# Patient Record
Sex: MALE | Race: BLACK OR AFRICAN AMERICAN | Employment: OTHER | URBAN - METROPOLITAN AREA
[De-identification: names, ages, dates, MRNs, and addresses within clinical notes are randomized per-mention and may not be internally consistent; named-entity substitution may affect disease eponyms.]

---

## 2017-05-30 ENCOUNTER — OP HISTORICAL/CONVERTED ENCOUNTER (OUTPATIENT)
Dept: OTHER | Age: 72
End: 2017-05-30

## 2020-12-09 ENCOUNTER — OFFICE VISIT (OUTPATIENT)
Dept: ORTHOPEDIC SURGERY | Age: 75
End: 2020-12-09
Payer: MEDICARE

## 2020-12-09 VITALS — WEIGHT: 250 LBS | HEIGHT: 69 IN | BODY MASS INDEX: 37.03 KG/M2

## 2020-12-09 DIAGNOSIS — M75.52 BURSITIS OF LEFT SHOULDER: ICD-10-CM

## 2020-12-09 DIAGNOSIS — M25.512 LEFT SHOULDER PAIN, UNSPECIFIED CHRONICITY: Primary | ICD-10-CM

## 2020-12-09 DIAGNOSIS — M17.11 PRIMARY OSTEOARTHRITIS OF RIGHT KNEE: ICD-10-CM

## 2020-12-09 DIAGNOSIS — M25.562 LEFT KNEE PAIN, UNSPECIFIED CHRONICITY: ICD-10-CM

## 2020-12-09 PROCEDURE — 99203 OFFICE O/P NEW LOW 30 MIN: CPT | Performed by: ORTHOPAEDIC SURGERY

## 2020-12-09 PROCEDURE — 20611 DRAIN/INJ JOINT/BURSA W/US: CPT | Performed by: ORTHOPAEDIC SURGERY

## 2020-12-09 RX ORDER — METOPROLOL TARTRATE 100 MG/1
TABLET ORAL 2 TIMES DAILY
COMMUNITY

## 2020-12-09 RX ORDER — ATORVASTATIN CALCIUM 40 MG/1
TABLET, FILM COATED ORAL DAILY
COMMUNITY
End: 2022-06-17

## 2020-12-09 RX ORDER — TRIAMCINOLONE ACETONIDE 40 MG/ML
40 INJECTION, SUSPENSION INTRA-ARTICULAR; INTRAMUSCULAR ONCE
Status: COMPLETED | OUTPATIENT
Start: 2020-12-09 | End: 2020-12-09

## 2020-12-09 RX ORDER — LIDOCAINE HYDROCHLORIDE 10 MG/ML
9 INJECTION INFILTRATION; PERINEURAL ONCE
Status: COMPLETED | OUTPATIENT
Start: 2020-12-09 | End: 2020-12-09

## 2020-12-09 RX ORDER — FUROSEMIDE 40 MG/1
TABLET ORAL DAILY
COMMUNITY
End: 2022-06-17

## 2020-12-09 RX ORDER — ISOSORBIDE MONONITRATE 60 MG/1
TABLET, EXTENDED RELEASE ORAL
COMMUNITY

## 2020-12-09 RX ORDER — NAPROXEN 500 MG/1
500 TABLET ORAL 2 TIMES DAILY WITH MEALS
COMMUNITY
End: 2022-06-17

## 2020-12-09 RX ORDER — OLMESARTAN MEDOXOMIL 20 MG/1
20 TABLET ORAL DAILY
COMMUNITY
End: 2022-02-28

## 2020-12-09 RX ORDER — NITROGLYCERIN 400 UG/1
1 SPRAY ORAL
COMMUNITY
End: 2022-02-28

## 2020-12-09 RX ADMIN — TRIAMCINOLONE ACETONIDE 40 MG: 40 INJECTION, SUSPENSION INTRA-ARTICULAR; INTRAMUSCULAR at 14:32

## 2020-12-09 RX ADMIN — LIDOCAINE HYDROCHLORIDE 9 ML: 10 INJECTION INFILTRATION; PERINEURAL at 14:32

## 2020-12-09 NOTE — PATIENT INSTRUCTIONS
Shoulder Pain: Care Instructions  Your Care Instructions     You can hurt your shoulder by using it too much during an activity, such as fishing or baseball. It can also happen as part of the everyday wear and tear of getting older. Shoulder injuries can be slow to heal, but your shoulder should get better with time. Your doctor may recommend a sling to rest your shoulder. If you have injured your shoulder, you may need testing and treatment. Follow-up care is a key part of your treatment and safety. Be sure to make and go to all appointments, and call your doctor if you are having problems. It's also a good idea to know your test results and keep a list of the medicines you take. How can you care for yourself at home? · Take pain medicines exactly as directed. ? If the doctor gave you a prescription medicine for pain, take it as prescribed. ? If you are not taking a prescription pain medicine, ask your doctor if you can take an over-the-counter medicine. ? Do not take two or more pain medicines at the same time unless the doctor told you to. Many pain medicines contain acetaminophen, which is Tylenol. Too much acetaminophen (Tylenol) can be harmful. · If your doctor recommends that you wear a sling, use it as directed. Do not take it off before your doctor tells you to. · Put ice or a cold pack on the sore area for 10 to 20 minutes at a time. Put a thin cloth between the ice and your skin. · If there is no swelling, you can put moist heat, a heating pad, or a warm cloth on your shoulder. Some doctors suggest alternating between hot and cold. · Rest your shoulder for a few days. If your doctor recommends it, you can then begin gentle exercise of the shoulder, but do not lift anything heavy. When should you call for help? Call 911 anytime you think you may need emergency care. For example, call if:    · You have chest pain or pressure. This may occur with:  ? Sweating. ?  Shortness of breath. ? Nausea or vomiting. ? Pain that spreads from the chest to the neck, jaw, or one or both shoulders or arms. ? Dizziness or lightheadedness. ? A fast or uneven pulse. After calling 911, chew 1 adult-strength aspirin. Wait for an ambulance. Do not try to drive yourself.     · Your arm or hand is cool or pale or changes color. Call your doctor now or seek immediate medical care if:    · You have signs of infection, such as:  ? Increased pain, swelling, warmth, or redness in your shoulder. ? Red streaks leading from a place on your shoulder. ? Pus draining from an area of your shoulder. ? Swollen lymph nodes in your neck, armpits, or groin. ? A fever. Watch closely for changes in your health, and be sure to contact your doctor if:    · You cannot use your shoulder.     · Your shoulder does not get better as expected. Where can you learn more? Go to http://www.edwards.com/  Enter H996 in the search box to learn more about \"Shoulder Pain: Care Instructions. \"  Current as of: March 2, 2020               Content Version: 12.6  © 5507-3395 [employer]. Care instructions adapted under license by [employer] (which disclaims liability or warranty for this information). If you have questions about a medical condition or this instruction, always ask your healthcare professional. [place] [address] any warranty or liability for your use of this information. Knee Pain or Injury: Care Instructions  Your Care Instructions     Injuries are a common cause of knee problems. Sudden (acute) injuries may be caused by a direct blow to the knee. They can also be caused by abnormal twisting, bending, or falling on the knee. Pain, bruising, or swelling may be severe, and may start within minutes of the injury. Overuse is another cause of knee pain. Other causes are climbing stairs, kneeling, and other activities that use the knee.  Everyday wear and tear, especially as you get older, also can cause knee pain. Rest, along with home treatment, often relieves pain and allows your knee to heal. If you have a serious knee injury, you may need tests and treatment. Follow-up care is a key part of your treatment and safety. Be sure to make and go to all appointments, and call your doctor if you are having problems. It's also a good idea to know your test results and keep a list of the medicines you take. How can you care for yourself at home? · Be safe with medicines. Read and follow all instructions on the label. ? If the doctor gave you a prescription medicine for pain, take it as prescribed. ? If you are not taking a prescription pain medicine, ask your doctor if you can take an over-the-counter medicine. · Rest and protect your knee. Take a break from any activity that may cause pain. · Put ice or a cold pack on your knee for 10 to 20 minutes at a time. Put a thin cloth between the ice and your skin. · Prop up a sore knee on a pillow when you ice it or anytime you sit or lie down for the next 3 days. Try to keep it above the level of your heart. This will help reduce swelling. · If your knee is not swollen, you can put moist heat, a heating pad, or a warm cloth on your knee. · If your doctor recommends an elastic bandage, sleeve, or other type of support for your knee, wear it as directed. · Follow your doctor's instructions about how much weight you can put on your leg. Use a cane, crutches, or a walker as instructed. · Follow your doctor's instructions about activity during your healing process. If you can do mild exercise, slowly increase your activity. · Reach and stay at a healthy weight. Extra weight can strain the joints, especially the knees and hips, and make the pain worse. Losing even a few pounds may help. When should you call for help? Call 911 anytime you think you may need emergency care.  For example, call if:    · You have symptoms of a blood clot in your lung (called a pulmonary embolism). These may include:  ? Sudden chest pain. ? Trouble breathing. ? Coughing up blood. Call your doctor now or seek immediate medical care if:    · You have severe or increasing pain.     · Your leg or foot turns cold or changes color.     · You cannot stand or put weight on your knee.     · Your knee looks twisted or bent out of shape.     · You cannot move your knee.     · You have signs of infection, such as:  ? Increased pain, swelling, warmth, or redness. ? Red streaks leading from the knee. ? Pus draining from a place on your knee. ? A fever.     · You have signs of a blood clot in your leg (called a deep vein thrombosis), such as:  ? Pain in your calf, back of the knee, thigh, or groin. ? Redness and swelling in your leg or groin. Watch closely for changes in your health, and be sure to contact your doctor if:    · You have tingling, weakness, or numbness in your knee.     · You have any new symptoms, such as swelling.     · You have bruises from a knee injury that last longer than 2 weeks.     · You do not get better as expected. Where can you learn more? Go to http://www.gray.com/  Enter K195 in the search box to learn more about \"Knee Pain or Injury: Care Instructions. \"  Current as of: June 26, 2019               Content Version: 12.6  © 6367-0123 [employer]. Care instructions adapted under license by [employer] (which disclaims liability or warranty for this information). If you have questions about a medical condition or this instruction, always ask your healthcare professional. [address] any warranty or liability for your use of this information.

## 2020-12-09 NOTE — PROGRESS NOTES
Name: Stephen Morales    : 1945     Service Dept: [address] [place] [address] and Sports Medicine    Patient's Pharmacies:    [facility]/pharmacy #[id number] - Danyel Montano  [address] [place] [id number]  Phone: [phone] Fax: [phone]       Chief Complaint   Patient presents with    Shoulder Pain        Visit Vitals  Ht 5' 9\" (1.753 m)   Wt 250 lb (113.4 kg)   BMI 36.92 kg/m²      No Known Allergies   Current Outpatient Medications   Medication Sig Dispense Refill    nitroglycerin (NITROLINGUAL) 400 mcg/spray spray 1 Spray by SubLINGual route every five (5) minutes as needed for Chest Pain.  furosemide (LASIX) 40 mg tablet Take  by mouth daily.  naproxen (NAPROSYN) 500 mg tablet Take 500 mg by mouth two (2) times daily (with meals).  olmesartan (BENICAR) 20 mg tablet Take 20 mg by mouth daily.  atorvastatin (LIPITOR) 40 mg tablet Take  by mouth daily.  metoprolol tartrate (LOPRESSOR) 100 mg IR tablet Take  by mouth two (2) times a day.  isosorbide mononitrate ER (IMDUR) 60 mg CR tablet Take  by mouth every morning. Current Facility-Administered Medications   Medication Dose Route Frequency Provider Last Rate Last Dose    lidocaine (XYLOCAINE) 10 mg/mL (1 %) injection 9 mL  9 mL Other ONCE Jaswant Scott MD        triamcinolone acetonide (KENALOG-40) 40 mg/mL injection 40 mg  40 mg Intra artICUlar ONCE Jaswant Scott MD        lidocaine (XYLOCAINE) 10 mg/mL (1 %) injection 9 mL  9 mL Other ONCE Jaswant Scott MD        triamcinolone acetonide (KENALOG-40) 40 mg/mL injection 40 mg  40 mg Intra artICUlar ONCE Jaswant Scott MD          There is no problem list on file for this patient.      Family History   Problem Relation Age of Onset    No Known Problems Mother     No Known Problems Father       Social History     Tobacco Use    Smoking status: Former Smoker     Packs/day: 0.50 Years: 0.50     Pack years: 0.25     Last attempt to quit: 12/9/2020    Smokeless tobacco: Never Used   Substance and Sexual Activity    Alcohol use: Never     Frequency: Never    Drug use: Never    Sexual activity: Not Currently      History reviewed. No pertinent surgical history. Past Medical History:   Diagnosis Date    Arthritis     High cholesterol     Hypertension         I have reviewed and agree with PFSH and ROS and intake form in chart and the record. Review of Systems:   Patient is a pleasant appearing individual, appropriately dressed, well hydrated, well nourished, who is alert, appropriately oriented for age, and in no acute distress with a normal gait and normal affect who does not appear to be in any significant pain. Physical Exam:  Left Knee -Decrease range of motion with flexion, Knee arc of greater than 50 degrees, Some crepitation, Grossly neurovascularly intact, Good cap refill, No skin lesion, Moderate swelling, No gross instability, Some quadriceps weakness, greater than 50 degree arc    Right  Knee -Decrease range of motion with flexion, Knee arc of greater than 50 degrees, Some crepitation, Grossly neurovascularly intact, Good cap refill, No skin lesion, Moderate swelling, No gross instability, Some quadriceps weakness, greater than 50 degree arc      Procedure Documentation:    Please note that [employer] ultrasound was used to perform an ultrasound guided injection into the left shoulder. A pre-injection ultrasound was taken of left shoulder. After the needle was placed into the Posterior portal(s) and while the kenelog was injected another ultrasound picture confirmed the appropriate placement. The site of injection, left shoulder, was sterilely prepped. The injection of 40 mg Kenalog and Lidocaine was administered appropriately in left shoulder and the patient tolerated it well. No site reaction was identified. Appropriate dressing was placed.  Consent was obtained for the injection. Procedure Documentation:    Please note that [employer] ultrasound was used to perform an ultrasound guided injection into the left knee. A pre-injection ultrasound was taken of the left knee. After the needle was placed into the anterolateral portal(s) and while the kenelog was injected another ultrasound picture confirmed the appropriate placement. The site of injection, left knee, was sterilely prepped. The injection of 40 mg Kenalog and Lidocaine was administered appropriately into left knee and the patient tolerated it well. No site reaction was identified. Appropriate dressing was placed. Consent was obtained for the injection. Encounter Diagnoses     ICD-10-CM ICD-9-CM   1. Left shoulder pain, unspecified chronicity  M25.512 719.41   2. Bursitis of left shoulder  M75.52 726.10   3. Primary osteoarthritis of right knee  M17.11 715.16   4. Left knee pain, unspecified chronicity  M25.562 719.46       HPI:  The patient is here with a chief complaint of left shoulder pain, left knee pain, sharp throbbing pain. Nothing has helped. Using it makes it worse. Pain is 9/10. ROS:  10-point review of systems is positive for nighttime pain and weakness. X-rays of the left knee are unremarkable except for mild osteoarthritis. X-ray of the left shoulder are positive for lateral acromial spur. Assessment/Plan:  1. Left shoulder bursitis and left knee arthritic flare. Plan would be for cortisone injection for both. We will see the patient back in 1 week and go from there. Return to Office: Follow-up and Dispositions    · Return in about 1 week (around 12/16/2020). Scribed by Michael Campuzano as dictated by Luisa Cortez. Saeid Qiu MD.  Documentation True and Accepted Jaswant Qiu MD

## 2020-12-09 NOTE — LETTER
Blanca Handleyr 1945  
[id number]  
 
 
12/9/2020 I hereby authorize and direct Jaswant Gustafson MD, Colby Taylor, and whomever he may designate as his associate to perform upon myself the following procedure: 
 
Injection of: Kenalog, Supartz, Euflexxa, Orthovisc in the Right/Left ____________________. If any unforeseen condition arises in the course of the procedure, I further authorize him and his associated and/or assistant(s) to do whatever he/she deems advisable. The nature, purpose, benefits, risks, side effects, likelihood of achieving goals, and potential problems that might occur during recuperation, risks for not receiving the proposed care, treatment and services and alternatives of the procedure have been fully explained to me by my physician including, but not limited to: 
 
Swelling, joint pain, skin pigment changes, worsening of condition, and failure to improve. I acknowledge that no guarantee or assurance has been made to me as to the results that may be obtained or the likelihood of success. _______________________________________ Signature of patient or authorized representative [employer] and Sports Medicine fax: [phone]

## 2021-02-24 ENCOUNTER — APPOINTMENT (OUTPATIENT)
Dept: CT IMAGING | Age: 76
End: 2021-02-24
Attending: EMERGENCY MEDICINE
Payer: MEDICARE

## 2021-02-24 ENCOUNTER — HOSPITAL ENCOUNTER (EMERGENCY)
Age: 76
Discharge: ANOTHER HOSPITAL | End: 2021-02-24
Attending: EMERGENCY MEDICINE
Payer: MEDICARE

## 2021-02-24 ENCOUNTER — APPOINTMENT (OUTPATIENT)
Dept: GENERAL RADIOLOGY | Age: 76
End: 2021-02-24
Attending: EMERGENCY MEDICINE
Payer: MEDICARE

## 2021-02-24 ENCOUNTER — HOSPITAL ENCOUNTER (INPATIENT)
Age: 76
LOS: 4 days | Discharge: HOME | DRG: 389 | End: 2021-03-01
Attending: EMERGENCY MEDICINE | Admitting: FAMILY MEDICINE
Payer: MEDICARE

## 2021-02-24 VITALS
TEMPERATURE: 98.4 F | OXYGEN SATURATION: 96 % | WEIGHT: 258 LBS | SYSTOLIC BLOOD PRESSURE: 165 MMHG | RESPIRATION RATE: 20 BRPM | HEART RATE: 89 BPM | HEIGHT: 69 IN | BODY MASS INDEX: 38.21 KG/M2 | DIASTOLIC BLOOD PRESSURE: 82 MMHG

## 2021-02-24 DIAGNOSIS — K56.600 PARTIAL INTESTINAL OBSTRUCTION, UNSPECIFIED CAUSE (HCC): Primary | ICD-10-CM

## 2021-02-24 LAB
ALBUMIN SERPL-MCNC: 4 G/DL (ref 3.5–5)
ALBUMIN/GLOB SERPL: 1 {RATIO} (ref 1.1–2.2)
ALP SERPL-CCNC: 85 U/L (ref 45–117)
ALT SERPL-CCNC: 33 U/L (ref 12–78)
ANION GAP SERPL CALC-SCNC: 4 MMOL/L (ref 5–15)
AST SERPL W P-5'-P-CCNC: 23 U/L (ref 15–37)
BASOPHILS # BLD: 0.1 K/UL (ref 0–0.2)
BASOPHILS NFR BLD: 1 % (ref 0–2.5)
BILIRUB SERPL-MCNC: 0.8 MG/DL (ref 0.2–1)
BUN SERPL-MCNC: 22 MG/DL (ref 6–20)
BUN/CREAT SERPL: 15 (ref 12–20)
CA-I BLD-MCNC: 9.5 MG/DL (ref 8.5–10.1)
CHLORIDE SERPL-SCNC: 104 MMOL/L (ref 97–108)
CO2 SERPL-SCNC: 34 MMOL/L (ref 21–32)
CREAT SERPL-MCNC: 1.47 MG/DL (ref 0.7–1.3)
EOSINOPHIL # BLD: 0.2 K/UL (ref 0–0.7)
EOSINOPHIL NFR BLD: 3 % (ref 0.9–2.9)
ERYTHROCYTE [DISTWIDTH] IN BLOOD BY AUTOMATED COUNT: 14.9 % (ref 11.5–14.5)
GLOBULIN SER CALC-MCNC: 4 G/DL (ref 2–4)
GLUCOSE SERPL-MCNC: 130 MG/DL (ref 65–100)
HCT VFR BLD AUTO: 39.6 % (ref 41–53)
HGB BLD-MCNC: 13 G/DL (ref 13.5–17.5)
INR PPP: 1.1 (ref 0.9–1.1)
LIPASE SERPL-CCNC: 117 U/L (ref 73–393)
LYMPHOCYTES # BLD: 1 K/UL (ref 1–4.8)
LYMPHOCYTES NFR BLD: 12 % (ref 20.5–51.1)
MAGNESIUM SERPL-MCNC: 2 MG/DL (ref 1.6–2.4)
MCH RBC QN AUTO: 28.2 PG (ref 31–34)
MCHC RBC AUTO-ENTMCNC: 32.9 G/DL (ref 31–36)
MCV RBC AUTO: 85.5 FL (ref 80–100)
MONOCYTES # BLD: 0.7 K/UL (ref 0.2–2.4)
MONOCYTES NFR BLD: 8 % (ref 1.7–9.3)
NEUTS SEG # BLD: 6.5 K/UL (ref 1.8–7.7)
NEUTS SEG NFR BLD: 76 % (ref 42–75)
NRBC # BLD: 0.01 K/UL
NRBC BLD-RTO: 0.1 PER 100 WBC
PLATELET # BLD AUTO: 221 K/UL
PMV BLD AUTO: 8.7 FL (ref 6.5–11.5)
POTASSIUM SERPL-SCNC: 3.9 MMOL/L (ref 3.5–5.1)
PROT SERPL-MCNC: 8 G/DL (ref 6.4–8.2)
PROTHROMBIN TIME: 10.4 SEC (ref 9–11.1)
RBC # BLD AUTO: 4.63 M/UL (ref 4.5–5.9)
SODIUM SERPL-SCNC: 142 MMOL/L (ref 136–145)
WBC # BLD AUTO: 8.5 K/UL (ref 4.4–11.3)

## 2021-02-24 PROCEDURE — 83735 ASSAY OF MAGNESIUM: CPT

## 2021-02-24 PROCEDURE — 99285 EMERGENCY DEPT VISIT HI MDM: CPT

## 2021-02-24 PROCEDURE — 80053 COMPREHEN METABOLIC PANEL: CPT

## 2021-02-24 PROCEDURE — 99284 EMERGENCY DEPT VISIT MOD MDM: CPT

## 2021-02-24 PROCEDURE — 96375 TX/PRO/DX INJ NEW DRUG ADDON: CPT

## 2021-02-24 PROCEDURE — 93005 ELECTROCARDIOGRAM TRACING: CPT

## 2021-02-24 PROCEDURE — 0D9670Z DRAINAGE OF STOMACH WITH DRAINAGE DEVICE, VIA NATURAL OR ARTIFICIAL OPENING: ICD-10-PCS | Performed by: FAMILY MEDICINE

## 2021-02-24 PROCEDURE — 71045 X-RAY EXAM CHEST 1 VIEW: CPT

## 2021-02-24 PROCEDURE — 85610 PROTHROMBIN TIME: CPT

## 2021-02-24 PROCEDURE — 74011000250 HC RX REV CODE- 250

## 2021-02-24 PROCEDURE — 85025 COMPLETE CBC W/AUTO DIFF WBC: CPT

## 2021-02-24 PROCEDURE — 96376 TX/PRO/DX INJ SAME DRUG ADON: CPT

## 2021-02-24 PROCEDURE — 96374 THER/PROPH/DIAG INJ IV PUSH: CPT

## 2021-02-24 PROCEDURE — 74176 CT ABD & PELVIS W/O CONTRAST: CPT

## 2021-02-24 PROCEDURE — 74011250636 HC RX REV CODE- 250/636: Performed by: EMERGENCY MEDICINE

## 2021-02-24 PROCEDURE — 83690 ASSAY OF LIPASE: CPT

## 2021-02-24 RX ORDER — SODIUM CHLORIDE 9 MG/ML
150 INJECTION, SOLUTION INTRAVENOUS ONCE
Status: COMPLETED | OUTPATIENT
Start: 2021-02-24 | End: 2021-02-24

## 2021-02-24 RX ORDER — KETOROLAC TROMETHAMINE 30 MG/ML
15 INJECTION, SOLUTION INTRAMUSCULAR; INTRAVENOUS
Status: COMPLETED | OUTPATIENT
Start: 2021-02-24 | End: 2021-02-24

## 2021-02-24 RX ORDER — MORPHINE SULFATE 4 MG/ML
4 INJECTION, SOLUTION INTRAMUSCULAR; INTRAVENOUS ONCE
Status: COMPLETED | OUTPATIENT
Start: 2021-02-24 | End: 2021-02-24

## 2021-02-24 RX ORDER — ONDANSETRON 2 MG/ML
4 INJECTION INTRAMUSCULAR; INTRAVENOUS ONCE
Status: COMPLETED | OUTPATIENT
Start: 2021-02-24 | End: 2021-02-24

## 2021-02-24 RX ADMIN — MORPHINE SULFATE 4 MG: 4 INJECTION, SOLUTION INTRAMUSCULAR; INTRAVENOUS at 18:50

## 2021-02-24 RX ADMIN — MORPHINE SULFATE 4 MG: 4 INJECTION, SOLUTION INTRAMUSCULAR; INTRAVENOUS at 18:04

## 2021-02-24 RX ADMIN — TOPICAL ANESTHETIC 1 SPRAY: 200 SPRAY DENTAL; PERIODONTAL at 20:01

## 2021-02-24 RX ADMIN — KETOROLAC TROMETHAMINE 15 MG: 30 INJECTION, SOLUTION INTRAMUSCULAR at 18:50

## 2021-02-24 RX ADMIN — ONDANSETRON 4 MG: 2 INJECTION INTRAMUSCULAR; INTRAVENOUS at 18:03

## 2021-02-24 RX ADMIN — SODIUM CHLORIDE 150 ML/HR: 9 INJECTION, SOLUTION INTRAVENOUS at 18:02

## 2021-02-24 NOTE — ED TRIAGE NOTES
.  Chief Complaint   Patient presents with    Abdominal Pain     pt states abdominal pain since this morning hard to have a bowel movement, had to strain at this time to have bowel movement. Pmh of bowel obstruction w/surgery. Pt rates pain 10/10.  pt does have abdominal distention present

## 2021-02-25 ENCOUNTER — APPOINTMENT (OUTPATIENT)
Dept: GENERAL RADIOLOGY | Age: 76
DRG: 389 | End: 2021-02-25
Attending: FAMILY MEDICINE
Payer: MEDICARE

## 2021-02-25 ENCOUNTER — APPOINTMENT (OUTPATIENT)
Dept: GENERAL RADIOLOGY | Age: 76
DRG: 389 | End: 2021-02-25
Attending: INTERNAL MEDICINE
Payer: MEDICARE

## 2021-02-25 ENCOUNTER — APPOINTMENT (OUTPATIENT)
Dept: GENERAL RADIOLOGY | Age: 76
DRG: 389 | End: 2021-02-25
Attending: SURGERY
Payer: MEDICARE

## 2021-02-25 PROBLEM — K56.609 SMALL BOWEL OBSTRUCTION (HCC): Status: ACTIVE | Noted: 2021-02-25

## 2021-02-25 LAB
ABO + RH BLD: NORMAL
ANION GAP SERPL CALC-SCNC: 4 MMOL/L (ref 5–15)
ATRIAL RATE: 82 BPM
BLOOD GROUP ANTIBODIES SERPL: NEGATIVE
BUN SERPL-MCNC: 22 MG/DL (ref 6–20)
BUN/CREAT SERPL: 19 (ref 12–20)
CA-I BLD-MCNC: 8.3 MG/DL (ref 8.5–10.1)
CALCULATED P AXIS, ECG09: 22 DEGREES
CALCULATED R AXIS, ECG10: -25 DEGREES
CALCULATED T AXIS, ECG11: 57 DEGREES
CHLORIDE SERPL-SCNC: 108 MMOL/L (ref 97–108)
CO2 SERPL-SCNC: 29 MMOL/L (ref 21–32)
COVID-19 RAPID TEST, COVR: NOT DETECTED
CREAT SERPL-MCNC: 1.17 MG/DL (ref 0.7–1.3)
DIAGNOSIS, 93000: NORMAL
ERYTHROCYTE [DISTWIDTH] IN BLOOD BY AUTOMATED COUNT: 14.7 % (ref 11.5–14.5)
GLUCOSE SERPL-MCNC: 142 MG/DL (ref 65–100)
HCT VFR BLD AUTO: 36.9 % (ref 36.6–50.3)
HGB BLD-MCNC: 11.2 G/DL (ref 12.1–17)
MAGNESIUM SERPL-MCNC: 1.9 MG/DL (ref 1.6–2.4)
MCH RBC QN AUTO: 27.1 PG (ref 26–34)
MCHC RBC AUTO-ENTMCNC: 30.4 G/DL (ref 30–36.5)
MCV RBC AUTO: 89.3 FL (ref 80–99)
P-R INTERVAL, ECG05: 188 MS
PLATELET # BLD AUTO: 236 K/UL (ref 150–400)
PMV BLD AUTO: 11 FL (ref 8.9–12.9)
POTASSIUM SERPL-SCNC: 4 MMOL/L (ref 3.5–5.1)
Q-T INTERVAL, ECG07: 366 MS
QRS DURATION, ECG06: 105 MS
QTC CALCULATION (BEZET), ECG08: 423 MS
RBC # BLD AUTO: 4.13 M/UL (ref 4.1–5.7)
SARS-COV-2, COV2: NORMAL
SODIUM SERPL-SCNC: 141 MMOL/L (ref 136–145)
SPECIMEN EXP DATE BLD: NORMAL
SPECIMEN SOURCE: NORMAL
VENTRICULAR RATE, ECG03: 80 BPM
WBC # BLD AUTO: 7.5 K/UL (ref 4.1–11.1)

## 2021-02-25 PROCEDURE — 74019 RADEX ABDOMEN 2 VIEWS: CPT

## 2021-02-25 PROCEDURE — 71045 X-RAY EXAM CHEST 1 VIEW: CPT

## 2021-02-25 PROCEDURE — 74011250636 HC RX REV CODE- 250/636: Performed by: FAMILY MEDICINE

## 2021-02-25 PROCEDURE — 96374 THER/PROPH/DIAG INJ IV PUSH: CPT

## 2021-02-25 PROCEDURE — 99222 1ST HOSP IP/OBS MODERATE 55: CPT | Performed by: SURGERY

## 2021-02-25 PROCEDURE — 96375 TX/PRO/DX INJ NEW DRUG ADDON: CPT

## 2021-02-25 PROCEDURE — 74011250636 HC RX REV CODE- 250/636: Performed by: EMERGENCY MEDICINE

## 2021-02-25 PROCEDURE — 65270000032 HC RM SEMIPRIVATE

## 2021-02-25 PROCEDURE — 85027 COMPLETE CBC AUTOMATED: CPT

## 2021-02-25 PROCEDURE — 83735 ASSAY OF MAGNESIUM: CPT

## 2021-02-25 PROCEDURE — 87635 SARS-COV-2 COVID-19 AMP PRB: CPT

## 2021-02-25 PROCEDURE — 86901 BLOOD TYPING SEROLOGIC RH(D): CPT

## 2021-02-25 PROCEDURE — 36415 COLL VENOUS BLD VENIPUNCTURE: CPT

## 2021-02-25 PROCEDURE — 80048 BASIC METABOLIC PNL TOTAL CA: CPT

## 2021-02-25 RX ORDER — FACIAL-BODY WIPES
10 EACH TOPICAL DAILY PRN
Status: DISCONTINUED | OUTPATIENT
Start: 2021-02-25 | End: 2021-02-26

## 2021-02-25 RX ORDER — ACETAMINOPHEN 650 MG/1
650 SUPPOSITORY RECTAL
Status: DISCONTINUED | OUTPATIENT
Start: 2021-02-25 | End: 2021-03-01 | Stop reason: HOSPADM

## 2021-02-25 RX ORDER — MORPHINE SULFATE 4 MG/ML
4 INJECTION INTRAVENOUS ONCE
Status: COMPLETED | OUTPATIENT
Start: 2021-02-25 | End: 2021-02-25

## 2021-02-25 RX ORDER — MORPHINE SULFATE 2 MG/ML
2 INJECTION, SOLUTION INTRAMUSCULAR; INTRAVENOUS
Status: DISPENSED | OUTPATIENT
Start: 2021-02-25 | End: 2021-02-27

## 2021-02-25 RX ORDER — SODIUM CHLORIDE 0.9 % (FLUSH) 0.9 %
5-40 SYRINGE (ML) INJECTION EVERY 8 HOURS
Status: DISCONTINUED | OUTPATIENT
Start: 2021-02-25 | End: 2021-03-01 | Stop reason: HOSPADM

## 2021-02-25 RX ORDER — TRAZODONE HYDROCHLORIDE 50 MG/1
50 TABLET ORAL
COMMUNITY
Start: 2021-02-18 | End: 2022-02-28

## 2021-02-25 RX ORDER — SODIUM CHLORIDE 9 MG/ML
50 INJECTION, SOLUTION INTRAVENOUS CONTINUOUS
Status: DISCONTINUED | OUTPATIENT
Start: 2021-02-25 | End: 2021-02-28

## 2021-02-25 RX ORDER — SODIUM CHLORIDE 0.9 % (FLUSH) 0.9 %
5-40 SYRINGE (ML) INJECTION AS NEEDED
Status: DISCONTINUED | OUTPATIENT
Start: 2021-02-25 | End: 2021-02-28

## 2021-02-25 RX ORDER — MORPHINE SULFATE 2 MG/ML
2 INJECTION, SOLUTION INTRAMUSCULAR; INTRAVENOUS ONCE
Status: COMPLETED | OUTPATIENT
Start: 2021-02-25 | End: 2021-02-25

## 2021-02-25 RX ORDER — ONDANSETRON 2 MG/ML
4 INJECTION INTRAMUSCULAR; INTRAVENOUS
Status: DISCONTINUED | OUTPATIENT
Start: 2021-02-25 | End: 2021-03-01 | Stop reason: HOSPADM

## 2021-02-25 RX ORDER — CLOPIDOGREL BISULFATE 75 MG/1
75 TABLET ORAL DAILY
COMMUNITY
Start: 2021-02-06 | End: 2021-03-09

## 2021-02-25 RX ORDER — ONDANSETRON 2 MG/ML
4 INJECTION INTRAMUSCULAR; INTRAVENOUS
Status: COMPLETED | OUTPATIENT
Start: 2021-02-25 | End: 2021-02-25

## 2021-02-25 RX ORDER — CHOLECALCIFEROL (VITAMIN D3) 125 MCG
5 CAPSULE ORAL
Status: DISCONTINUED | OUTPATIENT
Start: 2021-02-25 | End: 2021-03-01 | Stop reason: HOSPADM

## 2021-02-25 RX ORDER — PROMETHAZINE HYDROCHLORIDE 25 MG/1
12.5 TABLET ORAL
Status: DISCONTINUED | OUTPATIENT
Start: 2021-02-25 | End: 2021-03-01 | Stop reason: HOSPADM

## 2021-02-25 RX ORDER — ACETAMINOPHEN 325 MG/1
650 TABLET ORAL
Status: DISCONTINUED | OUTPATIENT
Start: 2021-02-25 | End: 2021-03-01 | Stop reason: HOSPADM

## 2021-02-25 RX ADMIN — MORPHINE SULFATE 2 MG: 2 INJECTION, SOLUTION INTRAMUSCULAR; INTRAVENOUS at 02:28

## 2021-02-25 RX ADMIN — MORPHINE SULFATE 2 MG: 2 INJECTION, SOLUTION INTRAMUSCULAR; INTRAVENOUS at 09:04

## 2021-02-25 RX ADMIN — Medication 10 ML: at 15:21

## 2021-02-25 RX ADMIN — ONDANSETRON 4 MG: 2 INJECTION INTRAMUSCULAR; INTRAVENOUS at 14:42

## 2021-02-25 RX ADMIN — Medication 10 ML: at 21:50

## 2021-02-25 RX ADMIN — MORPHINE SULFATE 4 MG: 4 INJECTION INTRAVENOUS at 00:01

## 2021-02-25 RX ADMIN — SODIUM CHLORIDE 50 ML/HR: 9 INJECTION, SOLUTION INTRAVENOUS at 06:38

## 2021-02-25 RX ADMIN — ONDANSETRON 4 MG: 2 INJECTION INTRAMUSCULAR; INTRAVENOUS at 09:04

## 2021-02-25 RX ADMIN — ONDANSETRON 4 MG: 2 INJECTION INTRAMUSCULAR; INTRAVENOUS at 00:01

## 2021-02-25 RX ADMIN — Medication 10 ML: at 06:00

## 2021-02-25 RX ADMIN — MORPHINE SULFATE 2 MG: 2 INJECTION, SOLUTION INTRAMUSCULAR; INTRAVENOUS at 22:46

## 2021-02-25 NOTE — PROGRESS NOTES
Reason for Admission:   SBO                   RUR Score:    9%                 Plan for utilizing home health:  Requesting. Choice Letter signed. Referral sent via [place]. PCP: First and Last name:  Jesús Luo . Name of Practice:    Are you a current patient: Yes/No: Yes   Approximate date of last visit: Seen approx 2 weeks ago. Can you participate in a virtual visit with your PCP:                   Yes/call  Current Advanced Directive/Advance Care Plan:   Yes, ACP  Healthcare Decision Maker:   Juliet Rees @ [phone]) is primary HCDM. Transition of Care Plan:  D/C Plan is home with home health. Uses no DME.

## 2021-02-25 NOTE — ED PROVIDER NOTES
EMERGENCY DEPARTMENT HISTORY AND PHYSICAL EXAM      Date: 2/24/2021  Patient Name: Vernell Perkins      History of Presenting Illness     Chief Complaint   Patient presents with    Abdominal Pain     pt states abdominal pain since this morning hard to have a bowel movement, had to strain at this time to have bowel movement. Pmh of bowel obstruction w/surgery. Pt rates pain 10/10. pt does have abdominal distention present       History Provided By: Patient    HPI: Vernell Perkins, 77 y.o. male with a past medical history significant hypertension and Coronary artery disease presents to the ED with cc of abdominal pain and distention. Patient had previous bowel obstruction approximately 40 years ago for an unknown reason requiring extensive surgical procedure. He has had occasional pain and bloating since then though no recurrence of the obstruction. Today began with mid abdominal pain which is gradually worsened with distention of the abdomen. Denies fever vomiting GI blood loss he has passed some stool and gas today. There are no other complaints, changes, or physical findings at this time. PCP: Mally Chang MD    Current Facility-Administered Medications   Medication Dose Route Frequency Provider Last Rate Last Admin    benzocaine (HURRICAINE) 20 % spray   Mucous Membrane PRN Natasha White MD         Current Outpatient Medications   Medication Sig Dispense Refill    nitroglycerin (NITROLINGUAL) 400 mcg/spray spray 1 Spray by SubLINGual route every five (5) minutes as needed for Chest Pain.  furosemide (LASIX) 40 mg tablet Take  by mouth daily.  naproxen (NAPROSYN) 500 mg tablet Take 500 mg by mouth two (2) times daily (with meals).  olmesartan (BENICAR) 20 mg tablet Take 20 mg by mouth daily.  atorvastatin (LIPITOR) 40 mg tablet Take  by mouth daily.  metoprolol tartrate (LOPRESSOR) 100 mg IR tablet Take  by mouth two (2) times a day.       isosorbide mononitrate ER (IMDUR) 60 mg CR tablet Take  by mouth every morning. Past History     Past Medical History:  Past Medical History:   Diagnosis Date    Arthritis     Bowel obstruction ([address]     High cholesterol     Hypertension        Past Surgical History:  History reviewed. No pertinent surgical history. Family History:  Family History   Problem Relation Age of Onset    No Known Problems Mother     No Known Problems Father        Social History:  Social History     Tobacco Use    Smoking status: Former Smoker     Packs/day: 0.50     Years: 0.50     Pack years: 0.25     Quit date: 2020     Years since quittin.2    Smokeless tobacco: Never Used   Substance Use Topics    Alcohol use: Never     Frequency: Never    Drug use: Never       Allergies:  No Known Allergies      Review of Systems   Review of all other systems is negative  Review of Systems    Physical Exam   Elderly -American male in moderate to severe pain  Physical Exam  Vitals signs and nursing note reviewed. Constitutional:       General: He is in acute distress. Appearance: Normal appearance. He is well-developed. He is ill-appearing. He is not toxic-appearing. HENT:      Head: Normocephalic and atraumatic. Nose: Nose normal.      Mouth/Throat:      Mouth: Mucous membranes are moist.   Eyes:      Extraocular Movements: Extraocular movements intact. Conjunctiva/sclera: Conjunctivae normal.      Pupils: Pupils are equal, round, and reactive to light. Neck:      Musculoskeletal: Normal range of motion and neck supple. No neck rigidity or muscular tenderness. Vascular: No carotid bruit. Cardiovascular:      Rate and Rhythm: Normal rate and regular rhythm. Pulses: Normal pulses. Heart sounds: Normal heart sounds. Pulmonary:      Effort: Pulmonary effort is normal. No respiratory distress. Breath sounds: Normal breath sounds. No wheezing, rhonchi or rales.       Comments: Large midline scar old and well-healed distended abdomen tender to percussion active bowel sounds; clinically appears to be obstructed  Abdominal:      General: Abdomen is protuberant. There is distension. Palpations: There is no mass. Tenderness: There is generalized abdominal tenderness. There is no right CVA tenderness, left CVA tenderness, guarding or rebound. Negative signs include Hyde's sign and McBurney's sign. Hernia: No hernia is present. Musculoskeletal: Normal range of motion. Skin:     General: Skin is warm and dry. Neurological:      General: No focal deficit present. Mental Status: He is alert and oriented to person, place, and time. Mental status is at baseline. Psychiatric:         Mood and Affect: Mood normal.         Behavior: Behavior normal.         Thought Content: Thought content normal.         Lab and Diagnostic Study Results     Labs -     Recent Results (from the past 12 hour(s))   CBC WITH AUTOMATED DIFF    Collection Time: 02/24/21  4:47 PM   Result Value Ref Range    WBC 8.5 4.4 - 11.3 K/uL    RBC 4.63 4.50 - 5.90 M/uL    HGB 13.0 (L) 13.5 - 17.5 g/dL    HCT 39.6 (L) 41 - 53 %    MCV 85.5 80 - 100 FL    MCH 28.2 (L) 31 - 34 PG    MCHC 32.9 31.0 - 36.0 g/dL    RDW 14.9 (H) 11.5 - 14.5 %    PLATELET 827 K/uL    MPV 8.7 6.5 - 11.5 FL    NRBC 0.1  WBC    ABSOLUTE NRBC 0.01 K/uL    NEUTROPHILS 76 (H) 42 - 75 %    LYMPHOCYTES 12 (L) 20.5 - 51.1 %    MONOCYTES 8 1.7 - 9.3 %    EOSINOPHILS 3 (H) 0.9 - 2.9 %    BASOPHILS 1 0.0 - 2.5 %    ABS. NEUTROPHILS 6.5 1.8 - 7.7 K/UL    ABS. LYMPHOCYTES 1.0 1.0 - 4.8 K/UL    ABS. MONOCYTES 0.7 0.2 - 2.4 K/UL    ABS. EOSINOPHILS 0.2 0.0 - 0.7 K/UL    ABS.  BASOPHILS 0.1 0.0 - 0.2 K/UL   PROTHROMBIN TIME + INR    Collection Time: 02/24/21  4:47 PM   Result Value Ref Range    Prothrombin time 10.4 9.0 - 11.1 sec    INR 1.1 0.9 - 1.1     METABOLIC PANEL, COMPREHENSIVE    Collection Time: 02/24/21  4:47 PM   Result Value Ref Range    Sodium 142 136 - 145 mmol/L    Potassium 3.9 3.5 - 5.1 mmol/L    Chloride 104 97 - 108 mmol/L    CO2 34 (H) 21 - 32 mmol/L    Anion gap 4 (L) 5 - 15 mmol/L    Glucose 130 (H) 65 - 100 mg/dL    BUN 22 (H) 6 - 20 mg/dL    Creatinine 1.47 (H) 0.70 - 1.30 mg/dL    BUN/Creatinine ratio 15 12 - 20      GFR est AA 57 (L) >60 ml/min/1.73m2    GFR est non-AA 47 (L) >60 ml/min/1.73m2    Calcium 9.5 8.5 - 10.1 mg/dL    Bilirubin, total 0.8 0.2 - 1.0 mg/dL    AST (SGOT) 23 15 - 37 U/L    ALT (SGPT) 33 12 - 78 U/L    Alk. phosphatase 85 45 - 117 U/L    Protein, total 8.0 6.4 - 8.2 g/dL    Albumin 4.0 3.5 - 5.0 g/dL    Globulin 4.0 2.0 - 4.0 g/dL    A-G Ratio 1.0 (L) 1.1 - 2.2     LIPASE    Collection Time: 02/24/21  4:47 PM   Result Value Ref Range    Lipase 117 73 - 393 U/L   MAGNESIUM    Collection Time: 02/24/21  4:47 PM   Result Value Ref Range    Magnesium 2.0 1.6 - 2.4 mg/dL   EKG, 12 LEAD, INITIAL    Collection Time: 02/24/21  5:52 PM   Result Value Ref Range    Ventricular Rate 80 BPM    Atrial Rate 82 BPM    P-R Interval 188 ms    QRS Duration 105 ms    Q-T Interval 366 ms    QTC Calculation (Bezet) 423 ms    Calculated P Axis 22 degrees    Calculated R Axis -25 degrees    Calculated T Axis 57 degrees    Diagnosis       Sinus rhythm  Multiple ventricular premature complexes  Borderline left axis deviation  ST elevation suggests acute pericarditis         Radiologic Studies -   [unfilled]  CT Results  (Last 48 hours)               02/24/21 1713  CT ABD PELV WO CONT Final result    Impression:  1. Prominent small bowel loops with air-fluid levels. Differential   considerations include ileus, enteritis and low-grade bowel obstruction. Air   and stool throughout normal caliber large bowel. 2.  Colonic diverticulosis without acute diverticulitis. Other findings as above. Narrative:  CT abdomen and pelvis, 2/24/2021       History: Bowel obstruction. Technique: No oral or intravenous contrast was administered. Multiple   contiguous axial images were obtained from the diaphragm to the inferior pubic   rami. Coronal and sagittal reconstruction of images was made. All CT scans at this facility are performed using dose reduction optimization   techniques as appropriate to perform the exam including the following: Automated   exposure control, adjustments of the mA and/or kV according to patient size, or   use iterative reconstruction technique. Comparison: Including CT abdomen pelvis 8/23/2013. Findings: The included lung bases show atelectasis. Mild bronchiectasis is   present. Peribronchial thickening is noted in the right lower lobe. Coronary   artery calcifications are present. The liver has a subcentimeter calcification. The gallbladder, spleen, adrenal   glands, pancreas demonstrate no focal abnormality in this noncontrast enhanced   study. The kidneys show no calculi or hydronephrosis. No ventral wall hernia is identified. The stomach has normal contours. There are predominantly mid to distal small bowel loops with air-fluid levels   measuring up to almost 3 cm in diameter. A discrete transition point is not   identified at this time. No significant bowel wall thickening or adjacent   inflammatory changes are present. Differential considerations include ileus,   enteritis and low-grade bowel obstruction. The terminal ileum is within normal limits. The appendix images normally. Air   and stool are present throughout normal caliber large bowel and rectum. There   is colonic diverticulosis without acute diverticulitis. The bladder is small in volume. The prostate gland measures 4.7 cm x 3.9 cm. The seminal vesicles are within normal limits. Fat-containing inguinal hernias   are seen. The abdominal aorta is normal in caliber. Atherosclerotic plaque is seen in the   abdominal aorta, mesenteric and iliac vessels.        No free air or free fluid is noted.       Degenerative changes are seen in the spine, most pronounced at L4-L5 and L5-S1.               CXR Results  (Last 48 hours)    None          Medical Decision Making and ED Course   - I am the first and primary provider for this patient AND AM THE PRIMARY PROVIDER OF RECORD.    - I reviewed the vital signs, available nursing notes, past medical history, past surgical history, family history and social history.    - Initial assessment performed. The patients presenting problems have been discussed, and the staff are in agreement with the care plan formulated and outlined with them.  I have encouraged them to ask questions as they arise throughout their visit.    Vital Signs-Reviewed the patient's vital signs.    Patient Vitals for the past 12 hrs:   Temp Pulse Resp BP SpO2   02/24/21 1627 98.4 °F (36.9 °C) 76 20 (!) 174/96 96 %       EKG interpretation: (Preliminary): Performed at 1752, and read at 1800  Rhythm: normal sinus rhythm and PVC's; and regular . Rate (approx.): 80; Axis: right axis deviation; MD interval: normal; QRS interval: normal ; ST/T wave: normal; Other findings: Single PVC baseline interference no significant ST-T changes.      Records Reviewed: Nursing Notes and Old Medical Records    The patient presents with abdominal pain with a differential diagnosis of abdominal pain, appendicitis, biliary colic, cholecystitis, diverticulitis, gastritis, gastroenteritis, obstruction and pancreatitis    ED Course:              Provider Notes (Medical Decision Making):   75-year-old male previous history of small bowel obstruction began with abdominal pain and bloating this morning which is gradually worsened throughout the day now with a distended abdomen several dilated loops of small bowel with air-fluid levels.  CT shows gas and stool throughout the large and small bowel interpretation is partial small bowel obstruction versus enteritis.  Feeling some better after morphine and IV fluids.  No  general surgical consult available at this hospital will plan on transfer to [facility].  [facility]           Consultations:       Consultations: -  Telephone consultation with Dr. May Finnegan ED physician [facility] accepts the patient in transfer        Procedures and Critical Care       Performed by: Elisha Royal MD  PROCEDURES:  Procedures               CRITICAL CARE NOTE :  7:01 PM  Amount of Critical Care Time: 40(minutes)    IMPENDING DETERIORATION -Metabolic and Renal  ASSOCIATED RISK FACTORS - Hypotension, Shock, Metabolic changes and Dehydration  MANAGEMENT- Bedside Assessment  INTERPRETATION -  Xrays and CT Scan  INTERVENTIONS - hemodynamic mngmt and Metobolic interventions  CASE REVIEW -General surgery and ED physician at [facility]  TREATMENT RESPONSE -Improved  PERFORMED BY - Self    NOTES   :  I have spent critical care time involved in lab review, consultations with specialist, family decision- making, bedside attention and documentation. This time excludes time spent in any separate billed procedures. During this entire length of time I was immediately available to the patient . Elisha Royal MD        Disposition     Disposition: Transferred to [facility] patient verbally agreed to transfer and understand the risks involved as outlined in the EMTALA form. Remove if not discharged  DISCHARGE PLAN:  1. Current Discharge Medication List      CONTINUE these medications which have NOT CHANGED    Details   nitroglycerin (NITROLINGUAL) 400 mcg/spray spray 1 Spray by SubLINGual route every five (5) minutes as needed for Chest Pain. furosemide (LASIX) 40 mg tablet Take  by mouth daily. naproxen (NAPROSYN) 500 mg tablet Take 500 mg by mouth two (2) times daily (with meals). olmesartan (BENICAR) 20 mg tablet Take 20 mg by mouth daily. atorvastatin (LIPITOR) 40 mg tablet Take  by mouth daily.       metoprolol tartrate (LOPRESSOR) 100 mg IR tablet Take  by mouth two (2) times a day. isosorbide mononitrate ER (IMDUR) 60 mg CR tablet Take  by mouth every morning. 2.   Follow-up Information    None       3. Return to ED if worse   4. Current Discharge Medication List          Diagnosis     Clinical Impression: Small bowel obstruction  Attestations:    Artie Tsai MD    Please note that this dictation was completed with [employer], the computer voice recognition software. Quite often unanticipated grammatical, syntax, homophones, and other interpretive errors are inadvertently transcribed by the computer software. Please disregard these errors. Please excuse any errors that have escaped final proofreading. Thank you.

## 2021-02-25 NOTE — ACP (ADVANCE CARE PLANNING)
Advance Care Planning   Healthcare Decision Maker:     Primary HCDM is pts laly Gaytan @ [phone]).

## 2021-02-25 NOTE — ED NOTES
TRANSFER - OUT REPORT:    Verbal report given to Harmeet Hdz  being transferred to [facility] - ED for urgent transfer       Report consisted of patients Situation, Background, Assessment and   Recommendations(SBAR). Information from the following report(s) SBAR was reviewed with the receiving nurse. Opportunity for questions and clarification was provided.       Patient transported with:   Monitor

## 2021-02-25 NOTE — H&P
History and Physical    Patient: Miriam Richardson MRN: [id number]  SSN: [id number]    YOB: 1945  Age: 77 y.o. Sex: male      Subjective:      Chief Complaint: Abdominal Pain    HPI: Miriam Richardson is a 77 y.o. male with past medical history of coronary artery disease, hypertension, hyperlipidemia and previous small bowel obstruction (approximately 40 years ago) presenting to the ER with complaints of abdominal pain over the past 24 hours. Pain is described as an aching pain that has been constant since onset. Mr. Chrys Cranker has had associated nausea with nonbloody, nonbilious emesis. Mr. Chrys Cranker has not passed gas in 24 hours and last bowel movement was 2 days ago. Patient reports that he had to strain with last bowel movement and he passed little stool. Yesterday afternoon, his abdomen became more distended and pain intensified, described as 10 out of 10. Symptoms prompted ER visit last evening. Past medical history, past surgical history, family history, social history and home medication list was reviewed at the time of admission. Mr. Chrys Cranker is a full code. He has a remote history of smoking. He denies current tobacco, alcohol or illicit drug use. Past Medical History:   Diagnosis Date    Arthritis     Bowel obstruction ([facility] [address]     High cholesterol     Hx of abdominal surgery     for SBO    Hypertension      History reviewed. No pertinent surgical history. Family History   Problem Relation Age of Onset    No Known Problems Mother     No Known Problems Father     Hypertension Other      Social History     Tobacco Use    Smoking status: Former Smoker     Packs/day: 0.50     Years: 0.50     Pack years: 0.25     Quit date: 2020     Years since quittin.2    Smokeless tobacco: Never Used   Substance Use Topics    Alcohol use: Never     Frequency: Never      Prior to Admission medications    Medication Sig Start Date End Date Taking?  Authorizing Provider   traZODone (DESYREL) 50 mg tablet Take 50 mg by mouth nightly as needed for Insomnia. 2/18/21   Provider, Historical   clopidogreL (PLAVIX) 75 mg tab Take 75 mg by mouth daily. 2/6/21   Provider, Historical   nitroglycerin (NITROLINGUAL) 400 mcg/spray spray 1 Spray by SubLINGual route every five (5) minutes as needed for Chest Pain. Provider, Historical   furosemide (LASIX) 40 mg tablet Take  by mouth daily. Provider, Historical   naproxen (NAPROSYN) 500 mg tablet Take 500 mg by mouth two (2) times daily (with meals). Provider, Historical   olmesartan (BENICAR) 20 mg tablet Take 20 mg by mouth daily. Provider, Historical   atorvastatin (LIPITOR) 40 mg tablet Take  by mouth daily. Provider, Historical   metoprolol tartrate (LOPRESSOR) 100 mg IR tablet Take  by mouth two (2) times a day. Provider, Historical   isosorbide mononitrate ER (IMDUR) 60 mg CR tablet Take  by mouth every morning. Provider, Historical        No Known Allergies    Review of Systems:  Constitutional: Denies fevers, chills, fatigue, weakness, unexplained weight loss, night sweats. Head, Eyes, Ears, Nose, Mouth, Throat: Denies nasal congestion, sore throat, rhinorrhea, earache, ringing of the ears, difficulty hearing, facial pain, facial swelling. Respiratory: Denies shortness of breath, wheezing, cough, sputum production, hemoptysis. Denies use of oxygen at home. Cardiovascular: Denies chest pain, irregular heart beat, racing pulse, lower extremity edema, dizziness, dyspnea on exertion, orthopnea. Gastrointestinal: Positive for diffuse abdominal pain, nausea, vomiting, abdominal distention. No diarrhea, loss of appetite, acid reflux, melena, hematochezia, change in bowel habits. Endocrine: Denies intolerance to heat or cold. Denies polyuria, polydipsia, polyphagia. Denies recent weight changes. Genitourinary: Denies increased urinary frequency, dysuria, hematuria, urinary incontinence, increased urinary frequency.   Integument/Breast: Denies rash, itching or new skin lesions. Musculoskeletal: Denies joint swelling, joint pain, myalgias, neck pain, back pain. Neurological: Denies headaches, dizziness, confusion, tremors, numbness/tingling, paresthesias, weakness, problems with balance, loss of consciousness. Hematologic: Denies easy bleeding, easy bruising, lymphadenopathy. Behavioral/Psychiatric: Denies anxiety, depression, increased irritability, mood swings, delusions, hallucination, SI/HI. Objective:     Vitals:    02/24/21 2203 02/24/21 2205 02/25/21 0205   BP: (!) 152/87  (!) 148/84   Pulse: 94  91   Resp: 20     Temp: 98.4 °F (36.9 °C)     SpO2:  97% 96%        Physical Exam:  General: Alert and Oriented x 3. Appears uncomfortable. Nourished and well developed. Head/Eyes: Normocephalic, atraumatic, EOMI, PERRLA. Nose/Mouth: Turbinates within normal limits, No drainage. Mucous membranes are moist. NG tube in place in left nostril, no appreciated gastric contents in tubing or suction cannister. Throat and Neck: No masses, JVD, thyromegaly or lymphadenopathy appreciated. Cervical spine has good range of motion without pain. Lungs: Clear to auscultation bilaterally without wheezes, rhonchi or crackles. Good air movement bilaterally. Symmetric chest rise with respirations. Heart: Regular rate and rhythm. Normal S1/S2. No appreciated murmurs, rubs or gallops. No lower extremity edema. Abdomen: Pain with palpation in all four abdominal quadrants, guarding present. Abdomen is distended. Bowel sounds diminished in all four quadrants. Extremities:  Atraumatic. Able to move all extremities symmetrically. No abnormal bony protuberances appreciated. Back: No pain with palpation over spinous processes or paraspinal musculature. No CVA tenderness. Skin: Clean, dry and intact without appreciated lesions. Neurologic: A&Ox3. Cranial nerves 2-12 are grossly intact. Intact sensation and motor strength in all 4 extremities. No focal deficits. Psychiatric: Normal affect, normal thought process, good eye contact. Recent Results (from the past 24 hour(s))   CBC WITH AUTOMATED DIFF    Collection Time: 02/24/21  4:47 PM   Result Value Ref Range    WBC 8.5 4.4 - 11.3 K/uL    RBC 4.63 4.50 - 5.90 M/uL    HGB 13.0 (L) 13.5 - 17.5 g/dL    HCT 39.6 (L) 41 - 53 %    MCV 85.5 80 - 100 FL    MCH 28.2 (L) 31 - 34 PG    MCHC 32.9 31.0 - 36.0 g/dL    RDW 14.9 (H) 11.5 - 14.5 %    PLATELET 714 K/uL    MPV 8.7 6.5 - 11.5 FL    NRBC 0.1  WBC    ABSOLUTE NRBC 0.01 K/uL    NEUTROPHILS 76 (H) 42 - 75 %    LYMPHOCYTES 12 (L) 20.5 - 51.1 %    MONOCYTES 8 1.7 - 9.3 %    EOSINOPHILS 3 (H) 0.9 - 2.9 %    BASOPHILS 1 0.0 - 2.5 %    ABS. NEUTROPHILS 6.5 1.8 - 7.7 K/UL    ABS. LYMPHOCYTES 1.0 1.0 - 4.8 K/UL    ABS. MONOCYTES 0.7 0.2 - 2.4 K/UL    ABS. EOSINOPHILS 0.2 0.0 - 0.7 K/UL    ABS. BASOPHILS 0.1 0.0 - 0.2 K/UL   PROTHROMBIN TIME + INR    Collection Time: 02/24/21  4:47 PM   Result Value Ref Range    Prothrombin time 10.4 9.0 - 11.1 sec    INR 1.1 0.9 - 1.1     METABOLIC PANEL, COMPREHENSIVE    Collection Time: 02/24/21  4:47 PM   Result Value Ref Range    Sodium 142 136 - 145 mmol/L    Potassium 3.9 3.5 - 5.1 mmol/L    Chloride 104 97 - 108 mmol/L    CO2 34 (H) 21 - 32 mmol/L    Anion gap 4 (L) 5 - 15 mmol/L    Glucose 130 (H) 65 - 100 mg/dL    BUN 22 (H) 6 - 20 mg/dL    Creatinine 1.47 (H) 0.70 - 1.30 mg/dL    BUN/Creatinine ratio 15 12 - 20      GFR est AA 57 (L) >60 ml/min/1.73m2    GFR est non-AA 47 (L) >60 ml/min/1.73m2    Calcium 9.5 8.5 - 10.1 mg/dL    Bilirubin, total 0.8 0.2 - 1.0 mg/dL    AST (SGOT) 23 15 - 37 U/L    ALT (SGPT) 33 12 - 78 U/L    Alk.  phosphatase 85 45 - 117 U/L    Protein, total 8.0 6.4 - 8.2 g/dL    Albumin 4.0 3.5 - 5.0 g/dL    Globulin 4.0 2.0 - 4.0 g/dL    A-G Ratio 1.0 (L) 1.1 - 2.2     LIPASE    Collection Time: 02/24/21  4:47 PM   Result Value Ref Range    Lipase 117 73 - 393 U/L   MAGNESIUM    Collection Time: 02/24/21  4:47 PM Result Value Ref Range    Magnesium 2.0 1.6 - 2.4 mg/dL   EKG, 12 LEAD, INITIAL    Collection Time: 02/24/21  5:52 PM   Result Value Ref Range    Ventricular Rate 80 BPM    Atrial Rate 82 BPM    P-R Interval 188 ms    QRS Duration 105 ms    Q-T Interval 366 ms    QTC Calculation (Bezet) 423 ms    Calculated P Axis 22 degrees    Calculated R Axis -25 degrees    Calculated T Axis 57 degrees    Diagnosis       Sinus rhythm  Multiple ventricular premature complexes  Borderline left axis deviation  ST elevation suggests acute pericarditis         XR Results (maximum last 3): Results from [place] encounter on 02/24/21   XR CHEST PORT    Narrative Frontal radiograph of the chest at 8:48 PM compared to May 30, 2017. INDICATION: Nasogastric tube placement. Nasogastric tube is seen with the tip overlying the distal esophagus,  approximately 3 cm proximal to the gastroesophageal junction. Patient is status  post median sternotomy. Patient is rotated to the left. Low lung volumes. The  right hilum is prominent although essentially unchanged given the rotation  compared to prior exam. This atelectasis at the right lung base. No effusion. No  pneumothorax. Impression 1. Nasogastric tube projecting over the distal esophagus. 2. Right basilar atelectasis. CT Results (maximum last 3): Results from [place] encounter on 02/24/21   CT ABD PELV WO CONT    Narrative CT abdomen and pelvis, 2/24/2021    History: Bowel obstruction. Technique: No oral or intravenous contrast was administered. Multiple  contiguous axial images were obtained from the diaphragm to the inferior pubic  rami. Coronal and sagittal reconstruction of images was made.     All CT scans at this facility are performed using dose reduction optimization  techniques as appropriate to perform the exam including the following: Automated  exposure control, adjustments of the mA and/or kV according to patient size, or  use iterative reconstruction technique. Comparison: Including CT abdomen pelvis 8/23/2013. Findings: The included lung bases show atelectasis. Mild bronchiectasis is  present. Peribronchial thickening is noted in the right lower lobe. Coronary  artery calcifications are present. The liver has a subcentimeter calcification. The gallbladder, spleen, adrenal  glands, pancreas demonstrate no focal abnormality in this noncontrast enhanced  study. The kidneys show no calculi or hydronephrosis. No ventral wall hernia is identified. The stomach has normal contours. There are predominantly mid to distal small bowel loops with air-fluid levels  measuring up to almost 3 cm in diameter. A discrete transition point is not  identified at this time. No significant bowel wall thickening or adjacent  inflammatory changes are present. Differential considerations include ileus,  enteritis and low-grade bowel obstruction. The terminal ileum is within normal limits. The appendix images normally. Air  and stool are present throughout normal caliber large bowel and rectum. There  is colonic diverticulosis without acute diverticulitis. The bladder is small in volume. The prostate gland measures 4.7 cm x 3.9 cm. The seminal vesicles are within normal limits. Fat-containing inguinal hernias  are seen. The abdominal aorta is normal in caliber. Atherosclerotic plaque is seen in the  abdominal aorta, mesenteric and iliac vessels. No free air or free fluid is noted. Degenerative changes are seen in the spine, most pronounced at L4-L5 and L5-S1. Impression 1. Prominent small bowel loops with air-fluid levels. Differential  considerations include ileus, enteritis and low-grade bowel obstruction. Air  and stool throughout normal caliber large bowel. 2.  Colonic diverticulosis without acute diverticulitis. Other findings as above.          Assessment:     Robbisarah Waters is a 77 y.o. male with past medical history of previous small bowel obstruction presenting with abdominal pain. Admit for small bowel obstruction. Plan:     1. Admit to medical surgical bed. 2. Keep NPO. Consult Surgery. Order abdominal xray for the morning. 3. I have asked nursing staff to reposition NG tube and reinsert if needed. Repeat x-ray for positioning. 4. IV morphine PRN abdominal Pain (with holding parameters). IV Zofran PRN nausea or vomiting. 5. I have held home medications given concern for acute small bowel obstruction. Order topical Nitropaste as needed for systolic blood pressure greater than 150. GI PPX: IV Famotidine. DVT PPX: SCDs (pending possible surgical intervention).     Signed By: Penelope Osler, MD     February 25, 2021

## 2021-02-25 NOTE — PROGRESS NOTES
Patient seen by Earle Aguirre MD., this morning. Patient seen and examined at bedside in the ED. He reports abdominal pain is well-controlled. Abdominal X-ray today showing mildly dilated small bowel loops compared to CT abd/pelvis yesterday. Keep NPO. Surgery consulted for small bowel obstruction. Pain management with IV morphin 2 mg q.4 hours.

## 2021-02-25 NOTE — PROGRESS NOTES
Notified by nursing staff that epistaxis developed with attempt to place second NG tube. Currently patient without nausea or vomiting. Apply nasal bridge pressure. Alerted nurse that patient is on Plavix at home. Notify MD if nausea/vomiting occurs.

## 2021-02-25 NOTE — ED TRIAGE NOTES
Patient accepted as a transfer from [facility] for bowel obstruction patient stable on arrival

## 2021-02-25 NOTE — ED NOTES
PT accepted to [facility] ED by Dr. Reece Ford.  Number for Report [phone]

## 2021-02-25 NOTE — ED PROVIDER NOTES
EMERGENCY DEPARTMENT HISTORY AND PHYSICAL EXAM      Date: 2/24/2021  Patient Name: Erick Wan    History of Presenting Illness     Chief Complaint   Patient presents with    Abdominal Pain       History Provided By: Patient and Chart review    HPI: Erick Wan, 77 y.o. male with a past medical history significant Hypertension, arthritis, bowel obstructions presents to the ED as a transfer from [facility] for small bowel obstruction versus ileus. Patient presented to the outside facility with 10 out of 10 abdominal pain and distention similar to previous bowel obstruction. About 6 approximately 40 years ago. He did have nausea and vomiting. States pain nausea and vomiting are well controlled with medications he was given prior to arrival.  He arrives with NG tube in place. There are no other complaints, changes, or physical findings at this time.     PCP: Nandini Foster MD    Current Facility-Administered Medications on File Prior to Encounter   Medication Dose Route Frequency Provider Last Rate Last Admin    [COMPLETED] 0.9% sodium chloride infusion  150 mL/hr IntraVENous ONCE Joel Terry  mL/hr at 02/24/21 1802 150 mL/hr at 02/24/21 1802    [COMPLETED] morphine injection 4 mg  4 mg IntraVENous ONCE Joel Terry MD   4 mg at 02/24/21 1804    [COMPLETED] ondansetron (ZOFRAN) injection 4 mg  4 mg IntraVENous ONCE Joel Terry MD   4 mg at 02/24/21 1803    [COMPLETED] morphine injection 4 mg  4 mg IntraVENous ONCE Joel Terry MD   4 mg at 02/24/21 1850    [COMPLETED] ketorolac (TORADOL) injection 15 mg  15 mg IntraVENous NOW Joel Terry MD   15 mg at 02/24/21 1850    [COMPLETED] benzocaine (HURRICAINE) 20 % spray   Mucous Membrane NOW Joel Terry MD   1 Spray at 02/24/21 2001    [DISCONTINUED] benzocaine (HURRICAINE) 20 % spray   Mucous Membrane PRN Joel Terry MD         Current Outpatient Medications on File Prior to Encounter   Medication Sig Dispense Refill    nitroglycerin (NITROLINGUAL) 400 mcg/spray spray 1 Spray by SubLINGual route every five (5) minutes as needed for Chest Pain.  furosemide (LASIX) 40 mg tablet Take  by mouth daily.  naproxen (NAPROSYN) 500 mg tablet Take 500 mg by mouth two (2) times daily (with meals).  olmesartan (BENICAR) 20 mg tablet Take 20 mg by mouth daily.  atorvastatin (LIPITOR) 40 mg tablet Take  by mouth daily.  metoprolol tartrate (LOPRESSOR) 100 mg IR tablet Take  by mouth two (2) times a day.  isosorbide mononitrate ER (IMDUR) 60 mg CR tablet Take  by mouth every morning. Past History     Past Medical History:  Past Medical History:   Diagnosis Date    Arthritis     Bowel obstruction ([address]     High cholesterol     Hypertension        Past Surgical History:  History reviewed. No pertinent surgical history. Family History:  Family History   Problem Relation Age of Onset    No Known Problems Mother     No Known Problems Father        Social History:  Social History     Tobacco Use    Smoking status: Former Smoker     Packs/day: 0.50     Years: 0.50     Pack years: 0.25     Quit date: 2020     Years since quittin.2    Smokeless tobacco: Never Used   Substance Use Topics    Alcohol use: Never     Frequency: Never    Drug use: Never       Allergies:  No Known Allergies      Review of Systems     Review of Systems   Constitutional: Negative for appetite change, fatigue and fever. HENT: Negative for congestion, ear pain, sinus pressure, sinus pain and sore throat. Eyes: Negative. Respiratory: Negative for cough, chest tightness and shortness of breath. Cardiovascular: Negative for chest pain, palpitations and leg swelling. Gastrointestinal: Positive for abdominal pain, nausea and vomiting. Negative for diarrhea. Genitourinary: Negative for dysuria and flank pain. Musculoskeletal: Negative for arthralgias, back pain, gait problem and myalgias.    Skin: Negative for rash.   Neurological: Negative for dizziness, speech difficulty, light-headedness, numbness and headaches. Psychiatric/Behavioral: Negative for suicidal ideas. The patient is not nervous/anxious. Physical Exam     Physical Exam  Vitals signs and nursing note reviewed. Constitutional:       General: He is not in acute distress. Appearance: Normal appearance. He is well-developed. He is not ill-appearing or toxic-appearing. HENT:      Head: Normocephalic and atraumatic. Nose: Nose normal.      Mouth/Throat:      Mouth: Mucous membranes are moist.   Eyes:      Extraocular Movements: Extraocular movements intact. Conjunctiva/sclera: Conjunctivae normal.      Pupils: Pupils are equal, round, and reactive to light. Neck:      Musculoskeletal: Normal range of motion and neck supple. No neck rigidity or muscular tenderness. Vascular: No carotid bruit. Cardiovascular:      Rate and Rhythm: Normal rate and regular rhythm. Pulses: Normal pulses. Heart sounds: Normal heart sounds. Pulmonary:      Effort: Pulmonary effort is normal. No respiratory distress. Breath sounds: Normal breath sounds. No wheezing, rhonchi or rales. Comments: Large midline scar old and well-healed distended abdomen tender to percussion active bowel sounds; clinically appears to be obstructed  Abdominal:      General: Abdomen is protuberant. There is distension. Palpations: There is no mass. Tenderness: There is generalized abdominal tenderness. There is no right CVA tenderness, left CVA tenderness, guarding or rebound. Negative signs include Hyde's sign and McBurney's sign. Hernia: No hernia is present. Musculoskeletal: Normal range of motion. Skin:     General: Skin is warm and dry. Neurological:      General: No focal deficit present. Mental Status: He is alert and oriented to person, place, and time. Mental status is at baseline.    Psychiatric:         Mood and Affect: Mood normal.         Behavior: Behavior normal.         Thought Content: Thought content normal.         Lab and Diagnostic Study Results     Labs -     Recent Results (from the past 12 hour(s))   CBC WITH AUTOMATED DIFF    Collection Time: 02/24/21  4:47 PM   Result Value Ref Range    WBC 8.5 4.4 - 11.3 K/uL    RBC 4.63 4.50 - 5.90 M/uL    HGB 13.0 (L) 13.5 - 17.5 g/dL    HCT 39.6 (L) 41 - 53 %    MCV 85.5 80 - 100 FL    MCH 28.2 (L) 31 - 34 PG    MCHC 32.9 31.0 - 36.0 g/dL    RDW 14.9 (H) 11.5 - 14.5 %    PLATELET 894 K/uL    MPV 8.7 6.5 - 11.5 FL    NRBC 0.1  WBC    ABSOLUTE NRBC 0.01 K/uL    NEUTROPHILS 76 (H) 42 - 75 %    LYMPHOCYTES 12 (L) 20.5 - 51.1 %    MONOCYTES 8 1.7 - 9.3 %    EOSINOPHILS 3 (H) 0.9 - 2.9 %    BASOPHILS 1 0.0 - 2.5 %    ABS. NEUTROPHILS 6.5 1.8 - 7.7 K/UL    ABS. LYMPHOCYTES 1.0 1.0 - 4.8 K/UL    ABS. MONOCYTES 0.7 0.2 - 2.4 K/UL    ABS. EOSINOPHILS 0.2 0.0 - 0.7 K/UL    ABS. BASOPHILS 0.1 0.0 - 0.2 K/UL   PROTHROMBIN TIME + INR    Collection Time: 02/24/21  4:47 PM   Result Value Ref Range    Prothrombin time 10.4 9.0 - 11.1 sec    INR 1.1 0.9 - 1.1     METABOLIC PANEL, COMPREHENSIVE    Collection Time: 02/24/21  4:47 PM   Result Value Ref Range    Sodium 142 136 - 145 mmol/L    Potassium 3.9 3.5 - 5.1 mmol/L    Chloride 104 97 - 108 mmol/L    CO2 34 (H) 21 - 32 mmol/L    Anion gap 4 (L) 5 - 15 mmol/L    Glucose 130 (H) 65 - 100 mg/dL    BUN 22 (H) 6 - 20 mg/dL    Creatinine 1.47 (H) 0.70 - 1.30 mg/dL    BUN/Creatinine ratio 15 12 - 20      GFR est AA 57 (L) >60 ml/min/1.73m2    GFR est non-AA 47 (L) >60 ml/min/1.73m2    Calcium 9.5 8.5 - 10.1 mg/dL    Bilirubin, total 0.8 0.2 - 1.0 mg/dL    AST (SGOT) 23 15 - 37 U/L    ALT (SGPT) 33 12 - 78 U/L    Alk.  phosphatase 85 45 - 117 U/L    Protein, total 8.0 6.4 - 8.2 g/dL    Albumin 4.0 3.5 - 5.0 g/dL    Globulin 4.0 2.0 - 4.0 g/dL    A-G Ratio 1.0 (L) 1.1 - 2.2     LIPASE    Collection Time: 02/24/21  4:47 PM   Result Value Ref Range    Lipase 117 73 - 393 U/L   MAGNESIUM    Collection Time: 02/24/21  4:47 PM   Result Value Ref Range    Magnesium 2.0 1.6 - 2.4 mg/dL   EKG, 12 LEAD, INITIAL    Collection Time: 02/24/21  5:52 PM   Result Value Ref Range    Ventricular Rate 80 BPM    Atrial Rate 82 BPM    P-R Interval 188 ms    QRS Duration 105 ms    Q-T Interval 366 ms    QTC Calculation (Bezet) 423 ms    Calculated P Axis 22 degrees    Calculated R Axis -25 degrees    Calculated T Axis 57 degrees    Diagnosis       Sinus rhythm  Multiple ventricular premature complexes  Borderline left axis deviation  ST elevation suggests acute pericarditis         Radiologic Studies -   @[email or web address]@  CT Results  (Last 48 hours)               02/24/21 1713  CT ABD PELV WO CONT Final result    Impression:  1. Prominent small bowel loops with air-fluid levels. Differential   considerations include ileus, enteritis and low-grade bowel obstruction. Air   and stool throughout normal caliber large bowel. 2.  Colonic diverticulosis without acute diverticulitis. Other findings as above. Narrative:  CT abdomen and pelvis, 2/24/2021       History: Bowel obstruction. Technique: No oral or intravenous contrast was administered. Multiple   contiguous axial images were obtained from the diaphragm to the inferior pubic   rami. Coronal and sagittal reconstruction of images was made. All CT scans at this facility are performed using dose reduction optimization   techniques as appropriate to perform the exam including the following: Automated   exposure control, adjustments of the mA and/or kV according to patient size, or   use iterative reconstruction technique. Comparison: Including CT abdomen pelvis 8/23/2013. Findings: The included lung bases show atelectasis. Mild bronchiectasis is   present. Peribronchial thickening is noted in the right lower lobe. Coronary   artery calcifications are present.        The liver has a subcentimeter calcification. The gallbladder, spleen, adrenal   glands, pancreas demonstrate no focal abnormality in this noncontrast enhanced   study. The kidneys show no calculi or hydronephrosis. No ventral wall hernia is identified. The stomach has normal contours. There are predominantly mid to distal small bowel loops with air-fluid levels   measuring up to almost 3 cm in diameter. A discrete transition point is not   identified at this time. No significant bowel wall thickening or adjacent   inflammatory changes are present. Differential considerations include ileus,   enteritis and low-grade bowel obstruction. The terminal ileum is within normal limits. The appendix images normally. Air   and stool are present throughout normal caliber large bowel and rectum. There   is colonic diverticulosis without acute diverticulitis. The bladder is small in volume. The prostate gland measures 4.7 cm x 3.9 cm. The seminal vesicles are within normal limits. Fat-containing inguinal hernias   are seen. The abdominal aorta is normal in caliber. Atherosclerotic plaque is seen in the   abdominal aorta, mesenteric and iliac vessels. No free air or free fluid is noted. Degenerative changes are seen in the spine, most pronounced at L4-L5 and L5-S1. CXR Results  (Last 48 hours)               02/24/21 2052  XR CHEST PORT Final result    Impression:  1. Nasogastric tube projecting over the distal esophagus. 2. Right basilar atelectasis. Narrative:  Frontal radiograph of the chest at 8:48 PM compared to May 30, 2017. INDICATION: Nasogastric tube placement. Nasogastric tube is seen with the tip overlying the distal esophagus,   approximately 3 cm proximal to the gastroesophageal junction. Patient is status   post median sternotomy. Patient is rotated to the left. Low lung volumes.  The   right hilum is prominent although essentially unchanged given the rotation   compared to prior exam. This atelectasis at the right lung base. No effusion. No   pneumothorax. Medical Decision Making   - I am the first provider for this patient. - I reviewed the vital signs, available nursing notes, past medical history, past surgical history, family history and social history. - Initial assessment performed. The patients presenting problems have been discussed, and they are in agreement with the care plan formulated and outlined with them. I have encouraged them to ask questions as they arise throughout their visit. Vital Signs-Reviewed the patient's vital signs. Patient Vitals for the past 12 hrs:   Temp Pulse Resp BP SpO2   02/24/21 2205 -- -- -- -- 97 %   02/24/21 2203 98.4 °F (36.9 °C) 94 20 (!) 152/87 --       Records Reviewed: Nursing Notes      ED Course:          Provider Notes (Medical Decision Making):   26-year-old male presenting as a transfer for bowel obstruction. Versus ileus. Will admit to medicine with surgical consult. NG tube in place to suction. Patient is otherwise stable. MDM      -  Hospitalist Consultant: Dr. Whitney Letters: We have asked for emergent assistance with regard to this patient. We have discussed the patients HPI, ROS, PE and results this far. They will come and evaluate the patient for admission. Disposition   Disposition: Admitted to Floor Stepdown Unit the case was discussed with the admitting physician      Admitted       Diagnosis     Clinical Impression:   1. Partial intestinal obstruction, unspecified cause [facility])        Attestations:    Robert Salter MD    Please note that this dictation was completed with Room[employer], the computer voice recognition software. Quite often unanticipated grammatical, syntax, homophones, and other interpretive errors are inadvertently transcribed by the computer software. Please disregard these errors.   Please excuse any errors that have escaped final proofreading. Thank you.

## 2021-02-25 NOTE — ED NOTES
While assessing patient suction canister was not filing. Assessed tube and noted that the tube was not in the correct position per the X-ray read from prior facility. Attempted to further advance NG into stomach and hit resistance,  Pulled tube to place another    While trying to place NG patient did not tolerate and nose began to bleed. Called Dr Theresa Simons who stated to leave it out at this time but to keep a close eye on patient. Patient resting at this time.

## 2021-02-25 NOTE — ED NOTES
Patient arrived [facility] [address] from [facility]. 20 g PIV in R AC in place. NS @ 150. NG in L nostril. No complaints at this time. Will continue to monitor.

## 2021-02-26 PROCEDURE — 97161 PT EVAL LOW COMPLEX 20 MIN: CPT

## 2021-02-26 PROCEDURE — 65270000032 HC RM SEMIPRIVATE

## 2021-02-26 PROCEDURE — 97530 THERAPEUTIC ACTIVITIES: CPT

## 2021-02-26 PROCEDURE — 99231 SBSQ HOSP IP/OBS SF/LOW 25: CPT | Performed by: SURGERY

## 2021-02-26 PROCEDURE — 74011250636 HC RX REV CODE- 250/636: Performed by: FAMILY MEDICINE

## 2021-02-26 PROCEDURE — 74011000250 HC RX REV CODE- 250: Performed by: STUDENT IN AN ORGANIZED HEALTH CARE EDUCATION/TRAINING PROGRAM

## 2021-02-26 RX ORDER — METOPROLOL TARTRATE 5 MG/5ML
2.5 INJECTION INTRAVENOUS
Status: DISCONTINUED | OUTPATIENT
Start: 2021-02-26 | End: 2021-02-27

## 2021-02-26 RX ADMIN — Medication 10 ML: at 04:30

## 2021-02-26 RX ADMIN — MORPHINE SULFATE 2 MG: 2 INJECTION, SOLUTION INTRAMUSCULAR; INTRAVENOUS at 06:46

## 2021-02-26 RX ADMIN — MORPHINE SULFATE 2 MG: 2 INJECTION, SOLUTION INTRAMUSCULAR; INTRAVENOUS at 02:51

## 2021-02-26 RX ADMIN — MORPHINE SULFATE 2 MG: 2 INJECTION, SOLUTION INTRAMUSCULAR; INTRAVENOUS at 14:32

## 2021-02-26 RX ADMIN — MORPHINE SULFATE 2 MG: 2 INJECTION, SOLUTION INTRAMUSCULAR; INTRAVENOUS at 10:17

## 2021-02-26 RX ADMIN — METOPROLOL TARTRATE 2.5 MG: 5 INJECTION, SOLUTION INTRAVENOUS at 12:00

## 2021-02-26 RX ADMIN — SODIUM CHLORIDE 50 ML/HR: 9 INJECTION, SOLUTION INTRAVENOUS at 22:54

## 2021-02-26 RX ADMIN — MORPHINE SULFATE 2 MG: 2 INJECTION, SOLUTION INTRAMUSCULAR; INTRAVENOUS at 19:07

## 2021-02-26 RX ADMIN — MORPHINE SULFATE 2 MG: 2 INJECTION, SOLUTION INTRAMUSCULAR; INTRAVENOUS at 22:54

## 2021-02-26 NOTE — ED NOTES
Verbal shift change report given to corby (oncoming nurse) by april (offgoing nurse). Report included the following information SBAR.

## 2021-02-26 NOTE — CONSULTS
History and Physical    Chief complaintsAbdominal swelling   History of Presenting Illness:  Juani Nava is a 77 y.o. pleasant man came to the hospital from [facility] with gradual abdominal swelling past 3 or 4 days. And pain started yesterday. He has been having intermittent constipation as well. I was consulted for evaluation for possible ileus versus bowel obstruction per CT scan. Patient examined in emergency room. Patient looks uncomfortable. Patient abdomen grossly distended. Patient apparently there are some subtle laparotomy for bowel obstruction in . He has been getting colonoscopy every 5 years. Patient denies weight loss. Patient described pain is located throughout the abdomen. Comes and goes colicky type pain. Denies fever chills. Past Medical History:   Diagnosis Date    Arthritis     Bowel obstruction ([address]     High cholesterol     Hx of abdominal surgery     for SBO    Hypertension       History reviewed. No pertinent surgical history. Family History   Problem Relation Age of Onset    No Known Problems Mother     No Known Problems Father     Hypertension Other       Social History     Tobacco Use    Smoking status: Former Smoker     Packs/day: 0.50     Years: 0.50     Pack years: 0.25     Quit date: 2020     Years since quittin.2    Smokeless tobacco: Never Used   Substance Use Topics    Alcohol use: Never     Frequency: Never       Prior to Admission medications    Medication Sig Start Date End Date Taking? Authorizing Provider   traZODone (DESYREL) 50 mg tablet Take 50 mg by mouth nightly as needed for Insomnia. 21   Provider, Historical   clopidogreL (PLAVIX) 75 mg tab Take 75 mg by mouth daily. 21   Provider, Historical   nitroglycerin (NITROLINGUAL) 400 mcg/spray spray 1 Spray by SubLINGual route every five (5) minutes as needed for Chest Pain. Provider, Historical   furosemide (LASIX) 40 mg tablet Take  by mouth daily.     Provider, Historical   naproxen (NAPROSYN) 500 mg tablet Take 500 mg by mouth two (2) times daily (with meals). Provider, Historical   olmesartan (BENICAR) 20 mg tablet Take 20 mg by mouth daily. Provider, Historical   atorvastatin (LIPITOR) 40 mg tablet Take  by mouth daily. Provider, Historical   metoprolol tartrate (LOPRESSOR) 100 mg IR tablet Take  by mouth two (2) times a day. Provider, Historical   isosorbide mononitrate ER (IMDUR) 60 mg CR tablet Take  by mouth every morning. Provider, Historical     No Known Allergies     Review of Systems:  Pertinent review of systems discussed in HPI, and rest of organ systems personally reviewed and they are negative. Objective:   Vital signs reviewed:  Temperature 98.4 blood pressure 137 oxygen is 97% respirations 20. Physical Exam:   General appearance: Patient looks well nourished pleasant    Head and neck exam otherwise atraumatic normocephalic. ENT oral mucosa is dry there is no hoarse voice. Eyes pupil equal gaze appropriate. Cardiac system regular rate. Pulmonary: No audible wheeze. Chest wall: Normal respiration self with chest wall movement. No chest wall deformity. Abdomen soft distended. Midline incision noted. Mildly tender to palpation. No palpable mass. Midline incision noted. There is no obvious hernia. Skin is warm and moist.  Psychosocial is appropriate and cooperative. Skin is warm and moist.  Musculoskeletal skeletal system shows moves all 4 extremities without any deficits. Neurologically nonfocal.  Cranial nerves intact. Hematology system shows no bruise. Data Review: Labs are reviewed. Discussed        Imagings reviewed: discussed as below. Assessment:     Active Problems:    Small bowel obstruction ([address] (2/25/2021)        Plan:     I reviewed the CT scan of the abdomen pelvis that was performed today. Since then looks like his abdomen is more distended. I talked to the ER staff. NG tube right away.   Keep the patient strict n.p.o.  NG tube continues suctioning. Daily electrolytes including potassium and magnesium as well though. Strict bowel rest.  We will continue monitor his progress with serial abdominal examination. I am wondering if this is related to the abdominal scar tissues causing bowel obstruction. He had a previous laparotomy some 40 years ago in 1980s for bowel obstruction. We will continue monitor his progress. I did inform the patient his current diagnosis care plans and prognosis in  Details. I thank you for consultation and let me participate with the care of this patient.

## 2021-02-26 NOTE — PROGRESS NOTES
Admission skin assessed with Saskia Astudillo RN. Patient has old, healed surgical scars on mid abdominal region, back and chest. All other skin clean, dry and intact.

## 2021-02-26 NOTE — PROGRESS NOTES
Hospitalist Progress Note    Subjective:   Daily Progress Note: 2/26/2021 8:11 AM    Hospital Course:  Ani Cha is a 25-year-old AA male with past medical history of coronary artery disease, hypertension, hyperlipidemia and previous small bowel obstruction (approximately 40 years ago) presenting to the ER with complaints of sudden onset abdominal pain. Pain is described as an aching pain that has been constant since onset. Mr. Vipul James has had associated nausea with nonbloody, nonbilious emesis. Patient states he was unable to pass gas 24 hours prior to presentation and hist last bowel movement was 2 days ago prior. Patient reports that he had to strain with last bowel movement and he passed little stool. In the ED, significant labs showed creatinine 1.47. CT of abdomen showed small air-fluid filled loops of bowel with differential including low-grade SBR, ileus, or enteritis. NG tube placed. Surgery consulted. Patient admitted by our service for small bowel obstruction. Subjective:    Patient seen and examined at bedside. He states that his pain is worse today and his abdomen is more swollen. He is also having episodes of nausea. He denies chest pain, SOB, emesis. NG tube collecting dark brown gastric contents.    Low grade temp 99.9 F this morning    Current Facility-Administered Medications   Medication Dose Route Frequency    sodium chloride (NS) flush 5-40 mL  5-40 mL IntraVENous Q8H    sodium chloride (NS) flush 5-40 mL  5-40 mL IntraVENous PRN    acetaminophen (TYLENOL) tablet 650 mg  650 mg Oral Q6H PRN    Or    acetaminophen (TYLENOL) suppository 650 mg  650 mg Rectal Q6H PRN    promethazine (PHENERGAN) tablet 12.5 mg  12.5 mg Oral Q6H PRN    Or    ondansetron (ZOFRAN) injection 4 mg  4 mg IntraVENous Q6H PRN    bisacodyL (DULCOLAX) suppository 10 mg  10 mg Rectal DAILY PRN    morphine injection 2 mg  2 mg IntraVENous Q4H PRN    nitroglycerin (NITROBID) 2 % ointment 0.5 Inch  0.5 Inch Topical Q6H PRN    0.9% sodium chloride infusion  50 mL/hr IntraVENous CONTINUOUS    melatonin tablet 5 mg  5 mg Oral QHS PRN        Review of Systems  Constitutional: No chills, No fatigue  Respiratory: No Shortness of Breath, No cough  Cardiovascular: No chest pain, No palpitations, No extremity edema  Gastrointestinal: + nausea, + abdominal pain, + abdominal swelling, No vomiting, No diarrhea  Genitourinary: No frequency, No dysuria  Integument/breast: No skin lesions  Neurological: No Confusion, No headaches      Objective:     Visit Vitals  BP (!) 152/70 (BP 1 Location: Left lower arm, BP Patient Position: At rest)   Pulse (!) 106   Temp 99.9 °F (37.7 °C)   Resp 19   SpO2 94%      O2 Device: Room air    Temp (24hrs), Av.9 °F (37.2 °C), Min:98.3 °F (36.8 °C), Max:99.9 °F (37.7 °C)      No intake/output data recorded.  1901 -  0700  In: -   Out: 1000 [Urine:400]    PHYSICAL EXAM:  Constitutional: Ill-appearing AA male. No acute distress  Skin: Extremities and face reveal no rashes. HEENT: NG tube. Sclerae anicteric. PERRL. The neck is supple and no masses. Cardiovascular: Regular rate and rhythm. Normal S1/S2. No murmur, gallop, click, or rub. No JVD  Respiratory:  Clear breath sounds bilaterally with no wheezes, rales, or rhonchi. On room air. GI: Abdomen very distended, diffusely tender to palpation Hypoactive bowel sounds. Rectal: Deferred   Musculoskeletal: No pitting edema of the lower legs. Able to move all ext  Neurological:  Patient is alert and oriented x3.  Cranial nerves II-XII grossly intact  Psychiatric: Mood appears appropriate       Data Review    Recent Results (from the past 24 hour(s))   TYPE & SCREEN    Collection Time: 21 10:23 AM   Result Value Ref Range    Crossmatch Expiration 2021,2359     ABO/Rh(D) Misty Lashell Positive     Antibody screen Negative    SARS-COV-2    Collection Time: 21 10:56 AM   Result Value Ref Range    SARS-CoV-2 Please find results under separate order     COVID-19 RAPID TEST    Collection Time: 02/25/21 10:56 AM   Result Value Ref Range    Specimen source Nasopharyngeal      COVID-19 rapid test Not Detected Not Detected         XR CHEST SNGL V   Final Result   No significant change. XR CHEST SNGL V   Final Result   Asymmetric bilateral airspace disease greater on the right   suspicious for atypical pneumonia. Prominence of the right pulmonary hilum of   uncertain significance. CTA of the chest may be helpful for further evaluation. XR ABD (AP AND ERECT OR DECUB)   Final Result      Mildly dilated small bowel loops, probably grossly stable compared to the prior   CT. This may represent enteritis or mild ileus, although partial small bowel   obstruction cannot be absolutely excluded. Follow-up as clinically indicated. Active Problems:    Small bowel obstruction ([address] (2/25/2021)        Assessment/Plan:     1. SBO vs. Ileus  - Hx previous laparotomy some 40 years ago in 1980s for bowel obstruction  - CT of abdomen showed small air-fluid filled loops of bowel with differential including low-grade SBO, ileus, or enteritis. - NPO  - NG tube placed  - IV NS at 50 mL/hr   - Pain management with IV morphine 2 mg q.4 hours   - Monitor electrolytes   - Low grade temp 99.9 F this morning  - Surgery consulted. 2. Coronary artery disease  - NPO. Hold home metoprolol and Imdur. 3. Hypertension  4. Hyperlipidemia   - NPO. Home medications held. - PRN IV metoprolol for blood pressure control      DVT Prophylaxis: SCDs to lower extremities   Code Status: Full    Tried reaching out to Rasheed ruffin, @ [phone] today but could not get through or leave a voicemail. Care Plan discussed with patient and nursing. Total time spent with patient: >35 minutes.

## 2021-02-26 NOTE — PROGRESS NOTES
Patient examined this morning. He has NG tube in place. NG tube have a high outputs. His abdomen is still very distended. Patient pending this morning. Correct electrolytes if there is any deficits especially potassium magnesium level. Continue n.p.o. NG tube suctioning and adequate hydration. His abdomen is still pretty distended. If he does not respond to conservative management he probably need a laparotomy. I will be out of town today and over the weekend. And general surgeon on-call will cover for me and this case will be signed out to him.

## 2021-02-26 NOTE — ROUTINE PROCESS
TRANSFER - OUT REPORT:    Verbal report given to nurse  on Leda Mcnulty  being transferred to [facility] for routine progression of care       Report consisted of patients Situation, Background, Assessment and   Recommendations(SBAR). Information from the following report(s) SBAR, ED Summary and MAR was reviewed with the receiving nurse. Lines:   Peripheral IV Right Antecubital (Active)        Opportunity for questions and clarification was provided.       Patient transported with:   Lexim
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
## 2021-02-26 NOTE — PROGRESS NOTES
CM reviewed clinical chart. Currently patient has no accepting [facility] that can accommodate patients locality and insurance. CM team will continue to follow.

## 2021-02-27 ENCOUNTER — APPOINTMENT (OUTPATIENT)
Dept: CT IMAGING | Age: 76
DRG: 389 | End: 2021-02-27
Attending: SURGERY
Payer: MEDICARE

## 2021-02-27 ENCOUNTER — APPOINTMENT (OUTPATIENT)
Dept: NON INVASIVE DIAGNOSTICS | Age: 76
DRG: 389 | End: 2021-02-27
Attending: INTERNAL MEDICINE
Payer: MEDICARE

## 2021-02-27 LAB
ALBUMIN SERPL-MCNC: 3.2 G/DL (ref 3.5–5)
ALBUMIN/GLOB SERPL: 0.7 {RATIO} (ref 1.1–2.2)
ALP SERPL-CCNC: 86 U/L (ref 45–117)
ALT SERPL-CCNC: 25 U/L (ref 12–78)
ANION GAP SERPL CALC-SCNC: 2 MMOL/L (ref 5–15)
AST SERPL W P-5'-P-CCNC: 31 U/L (ref 15–37)
BASOPHILS # BLD: 0 K/UL (ref 0–0.1)
BASOPHILS NFR BLD: 0 % (ref 0–1)
BILIRUB SERPL-MCNC: 1.4 MG/DL (ref 0.2–1)
BUN SERPL-MCNC: 19 MG/DL (ref 6–20)
BUN/CREAT SERPL: 17 (ref 12–20)
CA-I BLD-MCNC: 8.8 MG/DL (ref 8.5–10.1)
CHLORIDE SERPL-SCNC: 111 MMOL/L (ref 97–108)
CO2 SERPL-SCNC: 32 MMOL/L (ref 21–32)
CREAT SERPL-MCNC: 1.15 MG/DL (ref 0.7–1.3)
DIFFERENTIAL METHOD BLD: ABNORMAL
ECHO AV PEAK GRADIENT: 6 MMHG
ECHO LV E' SEPTAL VELOCITY: 6.08 CM/S
ECHO LV EDV A2C: 57.5 CM3
ECHO LV EJECTION FRACTION A2C: 26 %
ECHO LV EJECTION FRACTION BIPLANE: 60.2 % (ref 55–100)
ECHO LV ESV A2C: 22.9 CM3
ECHO LV INTERNAL DIMENSION DIASTOLIC: 3.86 CM (ref 4.2–5.9)
ECHO LV INTERNAL DIMENSION SYSTOLIC: 2.84 CM
ECHO LV IVSD: 1.77 CM (ref 0.6–1)
ECHO LV MASS 2D: 260.7 G (ref 88–224)
ECHO LV MASS INDEX 2D: 113.3 G/M2 (ref 49–115)
ECHO LV POSTERIOR WALL DIASTOLIC: 1.55 CM (ref 0.6–1)
ECHO LVOT PEAK GRADIENT: 2 MMHG
ECHO MV A VELOCITY: 102 CM/S
ECHO MV E DECELERATION TIME (DT): 85 MS
ECHO MV E VELOCITY: 70.3 CM/S
ECHO MV E/A RATIO: 0.69
ECHO MV E/E' SEPTAL: 11.56
ECHO PV PEAK INSTANTANEOUS GRADIENT SYSTOLIC: 5 MMHG
ECHO PV REGURGITANT MAX VELOCITY: 116 CM/S
ECHO PV REGURGITANT MAX VELOCITY: 122 CM/S
ECHO PV REGURGITANT MAX VELOCITY: 74 CM/S
ECHO PVEIN A DURATION: 102 MS
ECHO PVEIN A VELOCITY: 25.7 CM/S
EOSINOPHIL # BLD: 0.1 K/UL (ref 0–0.4)
EOSINOPHIL NFR BLD: 2 % (ref 0–7)
ERYTHROCYTE [DISTWIDTH] IN BLOOD BY AUTOMATED COUNT: 15 % (ref 11.5–14.5)
GLOBULIN SER CALC-MCNC: 4.4 G/DL (ref 2–4)
GLUCOSE SERPL-MCNC: 106 MG/DL (ref 65–100)
HCT VFR BLD AUTO: 39.8 % (ref 36.6–50.3)
HGB BLD-MCNC: 12.3 G/DL (ref 12.1–17)
IMM GRANULOCYTES # BLD AUTO: 0 K/UL (ref 0–0.04)
IMM GRANULOCYTES NFR BLD AUTO: 0 % (ref 0–0.5)
LYMPHOCYTES # BLD: 1.5 K/UL (ref 0.8–3.5)
LYMPHOCYTES NFR BLD: 16 % (ref 12–49)
MCH RBC QN AUTO: 28 PG (ref 26–34)
MCHC RBC AUTO-ENTMCNC: 30.9 G/DL (ref 30–36.5)
MCV RBC AUTO: 90.5 FL (ref 80–99)
MONOCYTES # BLD: 1 K/UL (ref 0–1)
MONOCYTES NFR BLD: 11 % (ref 5–13)
NEUTS SEG # BLD: 6.7 K/UL (ref 1.8–8)
NEUTS SEG NFR BLD: 71 % (ref 32–75)
PLATELET # BLD AUTO: 235 K/UL (ref 150–400)
PMV BLD AUTO: 11 FL (ref 8.9–12.9)
POTASSIUM SERPL-SCNC: 3.7 MMOL/L (ref 3.5–5.1)
PROT SERPL-MCNC: 7.6 G/DL (ref 6.4–8.2)
RBC # BLD AUTO: 4.4 M/UL (ref 4.1–5.7)
SODIUM SERPL-SCNC: 145 MMOL/L (ref 136–145)
WBC # BLD AUTO: 9.3 K/UL (ref 4.1–11.1)

## 2021-02-27 PROCEDURE — 74011000636 HC RX REV CODE- 636: Performed by: INTERNAL MEDICINE

## 2021-02-27 PROCEDURE — 85025 COMPLETE CBC W/AUTO DIFF WBC: CPT

## 2021-02-27 PROCEDURE — 74011250636 HC RX REV CODE- 250/636: Performed by: SURGERY

## 2021-02-27 PROCEDURE — 74011250636 HC RX REV CODE- 250/636: Performed by: INTERNAL MEDICINE

## 2021-02-27 PROCEDURE — 74011250637 HC RX REV CODE- 250/637: Performed by: FAMILY MEDICINE

## 2021-02-27 PROCEDURE — 74011250636 HC RX REV CODE- 250/636: Performed by: FAMILY MEDICINE

## 2021-02-27 PROCEDURE — 36415 COLL VENOUS BLD VENIPUNCTURE: CPT

## 2021-02-27 PROCEDURE — 74177 CT ABD & PELVIS W/CONTRAST: CPT

## 2021-02-27 PROCEDURE — 80053 COMPREHEN METABOLIC PANEL: CPT

## 2021-02-27 PROCEDURE — 93306 TTE W/DOPPLER COMPLETE: CPT

## 2021-02-27 PROCEDURE — 74011000250 HC RX REV CODE- 250: Performed by: SURGERY

## 2021-02-27 PROCEDURE — 74011250637 HC RX REV CODE- 250/637: Performed by: INTERNAL MEDICINE

## 2021-02-27 PROCEDURE — 65270000032 HC RM SEMIPRIVATE

## 2021-02-27 RX ORDER — LORAZEPAM 2 MG/ML
1 INJECTION INTRAMUSCULAR
Status: DISCONTINUED | OUTPATIENT
Start: 2021-02-27 | End: 2021-02-28

## 2021-02-27 RX ORDER — LABETALOL HYDROCHLORIDE 5 MG/ML
20 INJECTION, SOLUTION INTRAVENOUS
Status: DISCONTINUED | OUTPATIENT
Start: 2021-02-27 | End: 2021-03-01 | Stop reason: HOSPADM

## 2021-02-27 RX ORDER — LORAZEPAM 2 MG/ML
1 INJECTION INTRAMUSCULAR ONCE
Status: DISCONTINUED | OUTPATIENT
Start: 2021-02-27 | End: 2021-02-27

## 2021-02-27 RX ORDER — MORPHINE SULFATE 2 MG/ML
2 INJECTION, SOLUTION INTRAMUSCULAR; INTRAVENOUS
Status: DISPENSED | OUTPATIENT
Start: 2021-02-27 | End: 2021-03-01

## 2021-02-27 RX ADMIN — FAMOTIDINE 20 MG: 10 INJECTION INTRAVENOUS at 08:40

## 2021-02-27 RX ADMIN — FAMOTIDINE 20 MG: 10 INJECTION INTRAVENOUS at 02:40

## 2021-02-27 RX ADMIN — MORPHINE SULFATE 2 MG: 2 INJECTION, SOLUTION INTRAMUSCULAR; INTRAVENOUS at 13:48

## 2021-02-27 RX ADMIN — BENZOCAINE AND MENTHOL 1 LOZENGE: 15; 3.6 LOZENGE ORAL at 17:05

## 2021-02-27 RX ADMIN — IOPAMIDOL 100 ML: 755 INJECTION, SOLUTION INTRAVENOUS at 16:09

## 2021-02-27 RX ADMIN — MORPHINE SULFATE 2 MG: 2 INJECTION, SOLUTION INTRAMUSCULAR; INTRAVENOUS at 03:21

## 2021-02-27 RX ADMIN — DIATRIZOATE MEGLUMINE AND DIATRIZOATE SODIUM 30 ML: 660; 100 LIQUID ORAL; RECTAL at 10:56

## 2021-02-27 RX ADMIN — BENZOCAINE AND MENTHOL 1 LOZENGE: 15; 3.6 LOZENGE ORAL at 06:17

## 2021-02-27 RX ADMIN — FAMOTIDINE 20 MG: 10 INJECTION INTRAVENOUS at 21:00

## 2021-02-27 RX ADMIN — MORPHINE SULFATE 2 MG: 2 INJECTION, SOLUTION INTRAMUSCULAR; INTRAVENOUS at 19:35

## 2021-02-27 RX ADMIN — Medication 10 ML: at 14:00

## 2021-02-27 RX ADMIN — ACETAMINOPHEN 650 MG: 325 TABLET, FILM COATED ORAL at 20:25

## 2021-02-27 RX ADMIN — NITROGLYCERIN 0.5 INCH: 20 OINTMENT TOPICAL at 16:36

## 2021-02-27 RX ADMIN — MORPHINE SULFATE 2 MG: 2 INJECTION, SOLUTION INTRAMUSCULAR; INTRAVENOUS at 08:40

## 2021-02-27 RX ADMIN — LABETALOL HYDROCHLORIDE 20 MG: 5 INJECTION, SOLUTION INTRAVENOUS at 20:23

## 2021-02-27 NOTE — PROGRESS NOTES
[facility] SURGERY PROGRESS NOTES    Chief Complaint[de-identified] None      Subjective:  Feels slightly better. Less abdominal pain. Passed some flatus. No BM. Review of Systems:   Constitutional:  no fever, no chills,  no sweats, No weakness, No fatigue, No decreased activity. Respiratory: No shortness of breath, No cough, No sputum production, No hemoptysis, No wheezing, No cyanosis. Cardiovascular: No chest pain, No palpitations, No bradycardia, No tachycardia, No peripheral edema, No syncope. Gastrointestinal: No nausea, No vomiting, No diarrhea, No constipation, No heartburn,  abdominal pain. Genitourinary: No dysuria, No hematuria, No change in urine stream, No urethral discharge, No lesions. Hematology/Lymphatics: No bruising tendency, No bleeding tendency, No petechiae, No swollen lymph glands. Endocrine: No excessive thirst, No polyuria, No cold intolerance, No heat intolerance, No excessive hunger. Musculoskeletal: No back pain, No neck pain, No joint pain, No muscle pain, No claudication, No decreased range of motion, No trauma. Integumentary: No rash, No pruritus, No abrasions. Neurologic: Alert and oriented X4, No abnormal balance, No headache, No confusion, No numbness, No tingling. Psychiatric: No anxiety, No depression, No winnie. Physical Exam:     Vitals & Measurements:     Wt Readings from Last 3 Encounters:   02/24/21 117 kg (258 lb)   12/09/20 113.4 kg (250 lb)     Temp Readings from Last 3 Encounters:   02/26/21 98.7 °F (37.1 °C)   02/24/21 98.4 °F (36.9 °C)     BP Readings from Last 3 Encounters:   02/26/21 (!) 145/69   02/24/21 (!) 165/82     Pulse Readings from Last 3 Encounters:   02/26/21 100   02/24/21 89      Ht Readings from Last 3 Encounters:   02/24/21 5' 9\" (1.753 m)   12/09/20 5' 9\" (1.753 m)      Date 02/25/21 1900 - 02/26/21 0659 02/26/21 0700 - 02/27/21 0659   Shift 7178-2749 24 Hour Total 1176-8660 2999-9018 24 Hour Total   INTAKE   Shift Total        OUTPUT Urine  400        Urine Voided  400      Emesis/NG output 600 600        Output (ml) (Nasogastric Tube 02/25/21) 600 600      Shift Total 600 1000      NET -600 -1000      Weight (kg)            General: well appearing, no acute distress  Head: Normal  Face: Nornal  HEENT: atraumatic, PERRLA, moist mucosa, normal pharynx, normal tonsils and adenoids, normal tongue, no fluid in sinuses  Neck: Trachea midline, no carotid bruit, no masses  Chest: Normal.  Respiratory: normal chest wall expansion, CTA B, no r/r/w, no rubs  Cardiovascular: RRR, no m/r/g, Normal S1 and S2  Abdomen: Soft, mild epigastric and left side tenderness, no guarding or rebound,  tender, distended, normal bowel sounds in all quadrants, no hepatosplenomegaly, no tympany. Incision scar: Long midline   Genitourinary: No inguinal hernia, normal external gentalia, Testis & scrotum normal, no renal angle tenderness  Rectal: deferred  Musculoskeletal: Normal ROM in upper and lower extremities, No joint swelling. Integumentary: Warm, dry, and pink, with no rash, purpura, or petechia  Heme/Lymph: No lymphadenopathy, no bruises  Neurological: Cranial Nerves II-XII grossly intact, No gross sensory or motor deficit. Psychiatric: Cooperative with normal mood, affect, and cognition    Laboratory Values: No results found for this or any previous visit (from the past 24 hour(s)). XR CHEST SNGL V   Final Result   No significant change. XR CHEST SNGL V   Final Result   Asymmetric bilateral airspace disease greater on the right   suspicious for atypical pneumonia. Prominence of the right pulmonary hilum of   uncertain significance. CTA of the chest may be helpful for further evaluation. XR ABD (AP AND ERECT OR DECUB)   Final Result      Mildly dilated small bowel loops, probably grossly stable compared to the prior   CT. This may represent enteritis or mild ileus, although partial small bowel   obstruction cannot be absolutely excluded.  Follow-up as clinically indicated.          Assessment:  Problem List Items Addressed This Visit     None      Visit Diagnoses     Partial intestinal obstruction, unspecified cause (HCC)    -  Primary           Plan:    1. Admission  2. Diet: NPO   3. IV fluids  4. SCD  5. IS  6. Pain medications  7. Antibiotics  8. Nausea medication  9. Willis  10. NG to low continuous wall suction  11. Labs & Radiology in am  12. Consult: Cardiology eval  13. CT scan of abdomen and pelvis with PO & IV contrast in am    15. Plan discussed with patient and family and answered all their questions.     Thank you for allowing me to participate in the care of this patient.

## 2021-02-27 NOTE — PROGRESS NOTES
Hospitalist Progress Note    Subjective:   Daily Progress Note: 2/27/2021 10:18 AM    Hospital Course:  Sourav Graf a 71-year-old AA male with past medical history of coronary artery disease, hypertension, hyperlipidemia and previous small bowel obstruction (approximately 40 years ago) presenting to the ER with complaints of sudden onset abdominal pain. Pain is described as an aching pain that has been constant since onset. Mr. Nicole Baeza had associated nausea with nonbloody, nonbilious emesis.  Patient states he was unable to pass gas 24 hours prior to presentation and hist last bowel movement was 2 days ago prior.  Patient reports that he had to strain with last bowel movement and he passed little stool.      In the ED, significant labs showed creatinine 1.47. CT of abdomen showed small air-fluid filled loops of bowel with differential including low-grade SBR, ileus, or enteritis. NG tube placed and hooked up to continuous suction. Surgery consulted. Patient admitted by our service for small bowel obstruction. Started on GI prophylaxis with IV famotidine and PRN pain management. Subjective:    Patient seen and examined at bedside. He states \"My pain is the same. I wish they would just do the surgery. \"  Not very tolerable to NG tube. Labs still pending. Afebrile this morning.     Going for repeat CT abdomen today    Current Facility-Administered Medications   Medication Dose Route Frequency    morphine injection 2 mg  2 mg IntraVENous Q4H PRN    benzocaine-menthoL (CEPACOL) lozenge 1 Lozenge  1 Lozenge Mucous Membrane Q2H PRN    metoprolol (LOPRESSOR) injection 2.5 mg  2.5 mg IntraVENous Q6H PRN    famotidine (PF) (PEPCID) 20 mg in 0.9% sodium chloride 10 mL injection  20 mg IntraVENous Q12H    sodium chloride (NS) flush 5-40 mL  5-40 mL IntraVENous Q8H    sodium chloride (NS) flush 5-40 mL  5-40 mL IntraVENous PRN    acetaminophen (TYLENOL) tablet 650 mg  650 mg Oral Q6H PRN    Or    acetaminophen (TYLENOL) suppository 650 mg  650 mg Rectal Q6H PRN    promethazine (PHENERGAN) tablet 12.5 mg  12.5 mg Oral Q6H PRN    Or    ondansetron (ZOFRAN) injection 4 mg  4 mg IntraVENous Q6H PRN    nitroglycerin (NITROBID) 2 % ointment 0.5 Inch  0.5 Inch Topical Q6H PRN    0.9% sodium chloride infusion  50 mL/hr IntraVENous CONTINUOUS    melatonin tablet 5 mg  5 mg Oral QHS PRN        Review of Systems  Constitutional: No chills, No fatigue  Respiratory: No Shortness of Breath, No cough  Cardiovascular: No chest pain, No palpitations, No extremity edema  Gastrointestinal: + nausea, + abdominal pain, + abdominal swelling, No vomiting, No diarrhea  Genitourinary: No frequency, No dysuria  Integument/breast: No skin lesions  Neurological: No Confusion, No headaches      Objective:     Visit Vitals  BP (!) 156/68   Pulse 95   Temp 97.6 °F (36.4 °C)   Resp 16   SpO2 91%      O2 Device: Room air    Temp (24hrs), Av.2 °F (36.8 °C), Min:97.6 °F (36.4 °C), Max:98.8 °F (37.1 °C)      No intake/output data recorded.  1901 -  0700  In: -   Out: 1750 [Urine:800]    PHYSICAL EXAM:  Constitutional: Ill-appearing AA male. No acute distress  Skin: Extremities and face reveal no rashes. HEENT: NG tube. Sclerae anicteric. PERRL. The neck is supple and no masses. Cardiovascular: Tachycardic and regular rhythm. Normal S1/S2. No murmur, gallop, click, or rub. No JVD  Respiratory:  Clear breath sounds bilaterally with no wheezes, rales, or rhonchi. On room air. GI: Abdomen very distended, diffusely tender to palpation. Hypoactive bowel sounds. : Willis  Rectal: Deferred   Musculoskeletal: No pitting edema of the lower legs. Able to move all ext  Neurological:  Patient is alert and oriented x3. Cranial nerves II-XII grossly intact  Psychiatric: Mood appears appropriate       Data Review    No results found for this or any previous visit (from the past 24 hour(s)).     XR CHEST SNGL V   Final Result   No significant change. XR CHEST SNGL V   Final Result   Asymmetric bilateral airspace disease greater on the right   suspicious for atypical pneumonia. Prominence of the right pulmonary hilum of   uncertain significance. CTA of the chest may be helpful for further evaluation. XR ABD (AP AND ERECT OR DECUB)   Final Result      Mildly dilated small bowel loops, probably grossly stable compared to the prior   CT. This may represent enteritis or mild ileus, although partial small bowel   obstruction cannot be absolutely excluded. Follow-up as clinically indicated. CT ABD PELV W CONT    (Results Pending)       Active Problems:    Small bowel obstruction ([address] (2/25/2021)        Assessment/Plan:       1. SBO vs. Ileus  - Hx previous laparotomy some 40 years ago in 1980s for bowel obstruction  - CT of abdomen showed small air-fluid filled loops of bowel with differential including low-grade SBO, ileus, or enteritis. - NPO  - NG tube placed  - IV NS at 50 mL/hr   - Pain management with IV morphine 2 mg q.4 hours   - Monitor electrolytes   - Low grade temp 99.9 F yesterday. Afebrile this morning.  - Repeat CT abdomen today  - GI ppx with IV famotidine   - Cardiology eval   - Surgery consulted.     2. Coronary artery disease  - NPO. Hold home metoprolol and Imdur.     3. Hypertension  4. Hyperlipidemia   - NPO. Home medications held. - PRN IV metoprolol for blood pressure control        DVT Prophylaxis: SCDs to lower extremities   Code Status: Full     Tried reaching out to nephew, Fito Scanlon, @ [phone] today but could not get through or leave a voicemail. Care Plan discussed with patient and nursing.     Total time spent with patient: >35 minutes.

## 2021-02-27 NOTE — CONSULTS
This is an inpatient cardiology consultation requested by Dr. Trenton Castellano for preoperative cardiovascular risk assessment prior to planned abdominal surgery for small bowel obstruction. CHIEF COMPLAINT:  Sudden abdominal pain    HISTORY OF PRESENT ILLNESS  75yom followed by Dr. Jose Beasley. I am covering this weekend. Has h/o remote CABG, with last cardiac catheterization couple years ago with no PCI at that time. Does have known marginal disease but good collaterals. Per patient he has actually been having difficulty eating last couple of weeks, but few days ago developed markedly distended abdomen and then sudden pain just prior to presentation. Passes very infrequent stool. No fevers. Denies any sick contacts. I cannot elicit a h/o of true angina. Does have SOB with exertion but this has been present for many years, not worse. Denies lower extremity edema. In re: to hypertension, no vomiting but does have nausea attributed to SBO. Taking all meds as outpt. In re: to dyslipidemia, note no myalgias on statin therapy. REVIEW OF SYSTEMS  All other systems reviewed and are negative except for the pertinent positives as noted in the HPI. PAST MEDICAL HISTORY  Hypertension  CAD s/p CABG in 2001 per patient at [facility]  Dyslipidemia  Obesity  H/o SBO ~40 years ago per patient. SOCIAL HISTORY  No alcohol, tobacco, or drug use. Did drink alcohol and smoke extensively prior to CABG but Dr. Amarilys Vigil helped him to stop. FAMILY HISTORY  Fully reviewed and negative. No history of early heart disease amongst first degree relatives. PHYSICAL EXAMINATION  Visit Vitals  BP (!) 156/68   Pulse 95   Temp 97.6 °F (36.4 °C)   Resp 16   SpO2 91%       General: mild distress with NG tube in place secondary to abdominal discomfort. HEENT/neck: no JVD, no masses, trachea midline. NG tube in place.   Pulmonary: Clear to ausculation bilaterally  Cardiovascular: Regular rate, regular rhythm; no murmurs, rubs or gallops. Normal point of maximal impulse. No peripheral edema   GI: distended, diffuse tenderness (mild) on palpation  Hematology/Oncology: no lymphadenopathy; no bruising  Skin: warm and dry, no rashes, lesions, or ulcer  Musculoskeletal: Moving all four extremities. Gait and station not assessed    MEDICAL DECISION MAKING  ECG independently reviewed, my impression: sinus rhythm with early repolarization    Laboratory panel independently reviewed, my impression: PAPITO stage 1. Magnesium level at goal.    CXR independently reviewed, my impression: ? Atypical pneumonia. sternotomy    IMPRESSION/PLAN  Preoperative cardiovascular risk assessment  Small bowel obstruction  Acute kidney injury stage I secondary to prerenal azotemia  CAD, stable  Hypertension, uncontrolled    Recommend proceeding with planned surgery. He is acceptable risk. Some moderate CV risk does exist given his known h/o CABG yet no indication for stress testing or invasive coronary evaluation at the current time. Would proceed provided patient aware of risk/benefits of planned surgery. Will obtain echocardiogram which will help manage fluid status. For now, given PAPITO, would hydrate with LR at 1L then stop  (at ~150cc/hr)    Okay to hold all oral meds prior to planned surgery, but will add labetolol prn IV order given uncontrolled hypertension. Will stop metoprolol IV order. Call with ?'s or concerns.

## 2021-02-27 NOTE — CONSULTS
[id number] [facility] SURGERY CONSULT          Chief Complaint: Abdominal pain x 3 days    History of Present Illness:    Mr. Fidencio Mcpherson is a 77y.o. year old * male presented to ER with 3 day history of progressively worsening abdominal pain, nausea and vomiting. No fever or chills. He had a small bowel movment yesterday & passed little flatus. CT scan of abdomen and pelvis showed bowel obstruction. Past Medical History:   Past Medical History:   Diagnosis Date    Arthritis     Bowel obstruction ([address]     High cholesterol     Hx of abdominal surgery     for SBO    Hypertension        Past Surgical History: Exploratory Laparotomy for bowel obstruction many years ago, Appendectomy    Allergy:No Known Allergies    Social History:  reports that he quit smoking about 2 months ago. He has a 0.25 pack-year smoking history. He has never used smokeless tobacco. He reports that he does not drink alcohol or use drugs.      Family History:  Family History   Problem Relation Age of Onset    No Known Problems Mother     No Known Problems Father     Hypertension Other         Current Medications:  Current Facility-Administered Medications:     metoprolol (LOPRESSOR) injection 2.5 mg, 2.5 mg, IntraVENous, Q6H PRN, Tasia Parry PA-C, 2.5 mg at 02/26/21 1200    sodium chloride (NS) flush 5-40 mL, 5-40 mL, IntraVENous, Q8H, Alpa Tello MD, 10 mL at 02/26/21 0430    sodium chloride (NS) flush 5-40 mL, 5-40 mL, IntraVENous, PRN, Verónica Oviedo MD    acetaminophen (TYLENOL) tablet 650 mg, 650 mg, Oral, Q6H PRN **OR** acetaminophen (TYLENOL) suppository 650 mg, 650 mg, Rectal, Q6H PRN, Verónica Oviedo MD    promethazine (PHENERGAN) tablet 12.5 mg, 12.5 mg, Oral, Q6H PRN **OR** ondansetron (ZOFRAN) injection 4 mg, 4 mg, IntraVENous, Q6H PRN, Verónica Oviedo MD, 4 mg at 02/25/21 1442    bisacodyL (DULCOLAX) suppository 10 mg, 10 mg, Rectal, DAILY PRN, Verónica Oviedo MD    morphine injection 2 mg, 2 mg, IntraVENous, Q4H PRN, Nataly Starkey MD, 2 mg at 02/26/21 1907    nitroglycerin (NITROBID) 2 % ointment 0.5 Inch, 0.5 Inch, Topical, Q6H PRN, Nataly Starkey MD    0.9% sodium chloride infusion, 50 mL/hr, IntraVENous, CONTINUOUS, Nataly Starkey MD, Last Rate: 50 mL/hr at 02/25/21 0638, 50 mL/hr at 02/25/21 5910    melatonin tablet 5 mg, 5 mg, Oral, QHS PRN, Madan Ventura MD     Immunization History: There is no immunization history on file for this patient. Complete    Review of Systems:     Constitutional:  no fever,  no chills,  no sweats, No weakness, No fatigue, No decreased activity. Eye: No recent visual problem, No icterus, No discharge, No double vision. Ear/Nose/Mouth/Throat: No decreased hearing, No ear pain, No nasal congestion, No sore throat. Respiratory: No shortness of breath, No cough, No sputum production, No hemoptysis, No wheezing, No cyanosis. Cardiovascular: No chest pain, No palpitations, No bradycardia, No tachycardia, No peripheral edema, No syncope. Gastrointestinal:  nausea,   vomiting, No diarrhea, No constipation, No heartburn,  abdominal pain. Genitourinary: No dysuria, No hematuria, No change in urine stream, No urethral discharge, No lesions. Hematology/Lymphatics: No bruising tendency, No bleeding tendency, No petechiae, No swollen lymph glands. Endocrine: No excessive thirst, No polyuria, No cold intolerance, No heat intolerance, No excessive hunger. Immunologic: Not immunocompromised, No recurrent fevers, No recurrent infections. Musculoskeletal: No back pain, No neck pain, No joint pain, No muscle pain, No claudication, No decreased range of motion, No trauma. Integumentary: No rash, No pruritus, No abrasions. Neurologic: Alert and oriented X4, No abnormal balance, No headache, No confusion, No numbness, No tingling. Psychiatric: No anxiety, No depression, No winnie. Physical Exam:     Vitals & Measurements:     Wt Readings from Last 3 Encounters: 02/24/21 117 kg (258 lb)   12/09/20 113.4 kg (250 lb)     Temp Readings from Last 3 Encounters:   02/26/21 98.7 °F (37.1 °C)   02/24/21 98.4 °F (36.9 °C)     BP Readings from Last 3 Encounters:   02/26/21 (!) 145/69   02/24/21 (!) 165/82     Pulse Readings from Last 3 Encounters:   02/26/21 100   02/24/21 89      Ht Readings from Last 3 Encounters:   02/24/21 5' 9\" (1.753 m)   12/09/20 5' 9\" (1.753 m)          General: Obese, in mild  distress  Head: Normal  Face: Nornal  HEENT: atraumatic, PERRLA, moist mucosa, normal pharynx, normal tonsils and adenoids, normal tongue, no fluid in sinuses  Neck: Trachea midline, no carotid bruit, no masses  Chest: Normal.  Respiratory: Normal chest wall expansion, CTA B, no r/r/w, no rubs  Cardiovascular: RRR, no m/r/g, Normal S1 and S2  Abdomen: Soft, epigastric and left side tenderness, distended, normal bowel sounds in all quadrants, no hepatosplenomegaly, no tympany. Incision scar: Long midline scar. Genitourinary: No inguinal hernia, normal external gentalia, Testis & scrotum normal, no renal angle tenderness  Rectal: deferred  Musculoskeletal: normal ROM in upper and lower extremities, No joint swelling. Integumentary: Warm, dry, and pink, with no rash, purpura, or petechia  Heme/Lymph: No lymphadenopathy, no bruises  Neurological:Cranial Nerves II-XII grossly intact, no gross motor or sensory deficit  Psychiatric: Cooperative with normal mood, affect, and cognition      Laboratory Values: No results found for this or any previous visit (from the past 24 hour(s)). XR CHEST SNGL V   Final Result   No significant change. XR CHEST SNGL V   Final Result   Asymmetric bilateral airspace disease greater on the right   suspicious for atypical pneumonia. Prominence of the right pulmonary hilum of   uncertain significance. CTA of the chest may be helpful for further evaluation.       XR ABD (AP AND ERECT OR DECUB)   Final Result      Mildly dilated small bowel loops, probably grossly stable compared to the prior   CT. This may represent enteritis or mild ileus, although partial small bowel   obstruction cannot be absolutely excluded. Follow-up as clinically indicated. Assessment:  Problem List Items Addressed This Visit     None      Visit Diagnoses     Partial intestinal obstruction, unspecified cause ([facility]    -  Primary           Plan:    1. Admission  2. Diet: NPO  3. IV fluids  4. SCD  5. IS  6. Pain medications  7. Antibiotics  8. Nausea medication  9. Willis  10. NG to low continuous wall suction  11. Labs  12. Consult: Cardiology clearance   Thank you for the consultation & allowing me to participate in the care of this patient.

## 2021-02-28 LAB
ALBUMIN SERPL-MCNC: 3.1 G/DL (ref 3.5–5)
ALBUMIN/GLOB SERPL: 0.8 {RATIO} (ref 1.1–2.2)
ALP SERPL-CCNC: 66 U/L (ref 45–117)
ALT SERPL-CCNC: 30 U/L (ref 12–78)
ANION GAP SERPL CALC-SCNC: 5 MMOL/L (ref 5–15)
AST SERPL W P-5'-P-CCNC: 35 U/L (ref 15–37)
BASOPHILS # BLD: 0.1 K/UL (ref 0–0.1)
BASOPHILS NFR BLD: 1 % (ref 0–1)
BILIRUB SERPL-MCNC: 1.3 MG/DL (ref 0.2–1)
BUN SERPL-MCNC: 16 MG/DL (ref 6–20)
BUN/CREAT SERPL: 12 (ref 12–20)
CA-I BLD-MCNC: 8.5 MG/DL (ref 8.5–10.1)
CHLORIDE SERPL-SCNC: 105 MMOL/L (ref 97–108)
CO2 SERPL-SCNC: 30 MMOL/L (ref 21–32)
CREAT SERPL-MCNC: 1.3 MG/DL (ref 0.7–1.3)
DIFFERENTIAL METHOD BLD: ABNORMAL
EOSINOPHIL # BLD: 0.5 K/UL (ref 0–0.4)
EOSINOPHIL NFR BLD: 7 % (ref 0–7)
ERYTHROCYTE [DISTWIDTH] IN BLOOD BY AUTOMATED COUNT: 15.1 % (ref 11.5–14.5)
GLOBULIN SER CALC-MCNC: 3.7 G/DL (ref 2–4)
GLUCOSE SERPL-MCNC: 157 MG/DL (ref 65–100)
HCT VFR BLD AUTO: 36.7 % (ref 36.6–50.3)
HGB BLD-MCNC: 11.2 G/DL (ref 12.1–17)
IMM GRANULOCYTES # BLD AUTO: 0 K/UL (ref 0–0.04)
IMM GRANULOCYTES NFR BLD AUTO: 0 % (ref 0–0.5)
LYMPHOCYTES # BLD: 1.5 K/UL (ref 0.8–3.5)
LYMPHOCYTES NFR BLD: 19 % (ref 12–49)
MCH RBC QN AUTO: 27.5 PG (ref 26–34)
MCHC RBC AUTO-ENTMCNC: 30.5 G/DL (ref 30–36.5)
MCV RBC AUTO: 90.2 FL (ref 80–99)
MONOCYTES # BLD: 0.9 K/UL (ref 0–1)
MONOCYTES NFR BLD: 12 % (ref 5–13)
NEUTS SEG # BLD: 4.7 K/UL (ref 1.8–8)
NEUTS SEG NFR BLD: 61 % (ref 32–75)
PLATELET # BLD AUTO: 218 K/UL (ref 150–400)
PMV BLD AUTO: 11 FL (ref 8.9–12.9)
POTASSIUM SERPL-SCNC: 3.5 MMOL/L (ref 3.5–5.1)
PROT SERPL-MCNC: 6.8 G/DL (ref 6.4–8.2)
RBC # BLD AUTO: 4.07 M/UL (ref 4.1–5.7)
SODIUM SERPL-SCNC: 140 MMOL/L (ref 136–145)
WBC # BLD AUTO: 7.7 K/UL (ref 4.1–11.1)

## 2021-02-28 PROCEDURE — 65270000032 HC RM SEMIPRIVATE

## 2021-02-28 PROCEDURE — 85025 COMPLETE CBC W/AUTO DIFF WBC: CPT

## 2021-02-28 PROCEDURE — 74011250637 HC RX REV CODE- 250/637: Performed by: NURSE PRACTITIONER

## 2021-02-28 PROCEDURE — 74011250636 HC RX REV CODE- 250/636: Performed by: FAMILY MEDICINE

## 2021-02-28 PROCEDURE — 80053 COMPREHEN METABOLIC PANEL: CPT

## 2021-02-28 PROCEDURE — 74011250636 HC RX REV CODE- 250/636: Performed by: SURGERY

## 2021-02-28 PROCEDURE — 36415 COLL VENOUS BLD VENIPUNCTURE: CPT

## 2021-02-28 RX ORDER — CLOPIDOGREL BISULFATE 75 MG/1
75 TABLET ORAL ONCE
Status: COMPLETED | OUTPATIENT
Start: 2021-02-28 | End: 2021-02-28

## 2021-02-28 RX ORDER — FUROSEMIDE 40 MG/1
20 TABLET ORAL DAILY
Status: DISCONTINUED | OUTPATIENT
Start: 2021-02-28 | End: 2021-03-01 | Stop reason: HOSPADM

## 2021-02-28 RX ORDER — ATORVASTATIN CALCIUM 40 MG/1
40 TABLET, FILM COATED ORAL
Status: DISCONTINUED | OUTPATIENT
Start: 2021-02-28 | End: 2021-03-01 | Stop reason: HOSPADM

## 2021-02-28 RX ORDER — LOSARTAN POTASSIUM 50 MG/1
50 TABLET ORAL DAILY
Status: DISCONTINUED | OUTPATIENT
Start: 2021-02-28 | End: 2021-03-01 | Stop reason: HOSPADM

## 2021-02-28 RX ORDER — CLOPIDOGREL BISULFATE 75 MG/1
75 TABLET ORAL DAILY
Status: DISCONTINUED | OUTPATIENT
Start: 2021-03-01 | End: 2021-03-01 | Stop reason: HOSPADM

## 2021-02-28 RX ORDER — FAMOTIDINE 20 MG/1
20 TABLET, FILM COATED ORAL DAILY
Status: DISCONTINUED | OUTPATIENT
Start: 2021-03-01 | End: 2021-03-01 | Stop reason: HOSPADM

## 2021-02-28 RX ORDER — POLYETHYLENE GLYCOL 3350 17 G/17G
17 POWDER, FOR SOLUTION ORAL DAILY
Status: DISCONTINUED | OUTPATIENT
Start: 2021-03-01 | End: 2021-03-01 | Stop reason: HOSPADM

## 2021-02-28 RX ORDER — METOPROLOL TARTRATE 50 MG/1
100 TABLET ORAL 2 TIMES DAILY
Status: DISCONTINUED | OUTPATIENT
Start: 2021-02-28 | End: 2021-03-01 | Stop reason: HOSPADM

## 2021-02-28 RX ORDER — TRAZODONE HYDROCHLORIDE 50 MG/1
50 TABLET ORAL
Status: DISCONTINUED | OUTPATIENT
Start: 2021-02-28 | End: 2021-03-01 | Stop reason: HOSPADM

## 2021-02-28 RX ORDER — ISOSORBIDE MONONITRATE 60 MG/1
60 TABLET, EXTENDED RELEASE ORAL DAILY
Status: DISCONTINUED | OUTPATIENT
Start: 2021-02-28 | End: 2021-03-01 | Stop reason: HOSPADM

## 2021-02-28 RX ORDER — HEPARIN SODIUM 5000 [USP'U]/ML
5000 INJECTION, SOLUTION INTRAVENOUS; SUBCUTANEOUS EVERY 12 HOURS
Status: DISCONTINUED | OUTPATIENT
Start: 2021-02-28 | End: 2021-03-01 | Stop reason: HOSPADM

## 2021-02-28 RX ADMIN — MORPHINE SULFATE 2 MG: 2 INJECTION, SOLUTION INTRAMUSCULAR; INTRAVENOUS at 16:52

## 2021-02-28 RX ADMIN — FUROSEMIDE 20 MG: 40 TABLET ORAL at 12:27

## 2021-02-28 RX ADMIN — MORPHINE SULFATE 2 MG: 2 INJECTION, SOLUTION INTRAMUSCULAR; INTRAVENOUS at 23:59

## 2021-02-28 RX ADMIN — FAMOTIDINE 20 MG: 10 INJECTION INTRAVENOUS at 08:04

## 2021-02-28 RX ADMIN — ISOSORBIDE MONONITRATE 60 MG: 60 TABLET, EXTENDED RELEASE ORAL at 12:27

## 2021-02-28 RX ADMIN — MORPHINE SULFATE 2 MG: 2 INJECTION, SOLUTION INTRAMUSCULAR; INTRAVENOUS at 10:17

## 2021-02-28 RX ADMIN — METOPROLOL TARTRATE 100 MG: 50 TABLET, FILM COATED ORAL at 15:06

## 2021-02-28 RX ADMIN — MORPHINE SULFATE 2 MG: 2 INJECTION, SOLUTION INTRAMUSCULAR; INTRAVENOUS at 20:40

## 2021-02-28 RX ADMIN — CLOPIDOGREL BISULFATE 75 MG: 75 TABLET ORAL at 12:27

## 2021-02-28 RX ADMIN — Medication 10 ML: at 15:08

## 2021-02-28 RX ADMIN — MORPHINE SULFATE 2 MG: 2 INJECTION, SOLUTION INTRAMUSCULAR; INTRAVENOUS at 05:11

## 2021-02-28 RX ADMIN — TRAZODONE HYDROCHLORIDE 50 MG: 50 TABLET ORAL at 23:59

## 2021-02-28 RX ADMIN — LOSARTAN POTASSIUM 50 MG: 50 TABLET, FILM COATED ORAL at 12:27

## 2021-02-28 NOTE — PROGRESS NOTES
[address] [facility] SURGERY PROGRESS NOTES    Chief Complaint[de-identified] None      Subjective:  Feels better. Less abdominal pain. Passing flatus. No BM. CT scan with PO & IV contrast shows no bowel obstruction or any acute process    Review of Systems:   Constitutional:  no fever, no chills,  no sweats, No weakness, No fatigue, No decreased activity. Respiratory: No shortness of breath, No cough, No sputum production, No hemoptysis, No wheezing, No cyanosis. Cardiovascular: No chest pain, No palpitations, No bradycardia, No tachycardia, No peripheral edema, No syncope. Gastrointestinal: No nausea, No vomiting, No diarrhea, No constipation, No heartburn,  abdominal pain. Genitourinary: No dysuria, No hematuria, No change in urine stream, No urethral discharge, No lesions. Hematology/Lymphatics: No bruising tendency, No bleeding tendency, No petechiae, No swollen lymph glands. Endocrine: No excessive thirst, No polyuria, No cold intolerance, No heat intolerance, No excessive hunger. Musculoskeletal: No back pain, No neck pain, No joint pain, No muscle pain, No claudication, No decreased range of motion, No trauma. Integumentary: No rash, No pruritus, No abrasions. Neurologic: Alert and oriented X4, No abnormal balance, No headache, No confusion, No numbness, No tingling. Psychiatric: No anxiety, No depression, No winnie. Physical Exam:     Vitals & Measurements:     Wt Readings from Last 3 Encounters:   02/24/21 117 kg (258 lb)   12/09/20 113.4 kg (250 lb)     Temp Readings from Last 3 Encounters:   02/27/21 98.8 °F (37.1 °C)   02/24/21 98.4 °F (36.9 °C)     BP Readings from Last 3 Encounters:   02/27/21 (!) 148/77   02/24/21 (!) 165/82     Pulse Readings from Last 3 Encounters:   02/27/21 (!) 104   02/24/21 89      Ht Readings from Last 3 Encounters:   02/24/21 5' 9\" (1.753 m)   12/09/20 5' 9\" (1.753 m)      Date 02/26/21 1900 - 02/27/21 0659 02/27/21 0700 - 02/28/21 0659   Shift 8666-9078 24 Hour Total 3133-5134 0754-5078 24 Hour Total   INTAKE   Shift Total        OUTPUT   Urine 800 800 835  835     Urine Voided 800 800 835  835   Emesis/NG output 350 350        Emesis 350 350      Shift Total 1150 1150 835  835   NET -1150 -1150 -835  -835   Weight (kg)            General: well appearing, no acute distress  Head: Normal  Face: Nornal  HEENT: atraumatic, PERRLA, moist mucosa, normal pharynx, normal tonsils and adenoids, normal tongue, no fluid in sinuses  Neck: Trachea midline, no carotid bruit, no masses  Chest: Normal.  Respiratory: normal chest wall expansion, CTA B, no r/r/w, no rubs  Cardiovascular: RRR, no m/r/g, Normal S1 and S2  Abdomen: Soft, mild epigastric and left side tenderness, no guarding or rebound, non distended, normal bowel sounds in all quadrants, no hepatosplenomegaly, no tympany. Incision scar: Long midline   Genitourinary: No inguinal hernia, normal external gentalia, Testis & scrotum normal, no renal angle tenderness  Rectal: deferred  Musculoskeletal: Normal ROM in upper and lower extremities, No joint swelling. Integumentary: Warm, dry, and pink, with no rash, purpura, or petechia  Heme/Lymph: No lymphadenopathy, no bruises  Neurological: Cranial Nerves II-XII grossly intact, No gross sensory or motor deficit. Psychiatric: Cooperative with normal mood, affect, and cognition    Laboratory Values:   Recent Results (from the past 24 hour(s))   CBC WITH AUTOMATED DIFF    Collection Time: 02/27/21 10:52 AM   Result Value Ref Range    WBC 9.3 4.1 - 11.1 K/uL    RBC 4.40 4. 10 - 5.70 M/uL    HGB 12.3 12.1 - 17.0 g/dL    HCT 39.8 36.6 - 50.3 %    MCV 90.5 80.0 - 99.0 FL    MCH 28.0 26.0 - 34.0 PG    MCHC 30.9 30.0 - 36.5 g/dL    RDW 15.0 (H) 11.5 - 14.5 %    PLATELET 276 009 - 994 K/uL    MPV 11.0 8.9 - 12.9 FL    NEUTROPHILS 71 32 - 75 %    LYMPHOCYTES 16 12 - 49 %    MONOCYTES 11 5 - 13 %    EOSINOPHILS 2 0 - 7 %    BASOPHILS 0 0 - 1 %    IMMATURE GRANULOCYTES 0 0.0 - 0.5 %    ABS. NEUTROPHILS 6.7 1.8 - 8.0 K/UL    ABS. LYMPHOCYTES 1.5 0.8 - 3.5 K/UL    ABS. MONOCYTES 1.0 0.0 - 1.0 K/UL    ABS. EOSINOPHILS 0.1 0.0 - 0.4 K/UL    ABS. BASOPHILS 0.0 0.0 - 0.1 K/UL    ABS. IMM. GRANS. 0.0 0.00 - 0.04 K/UL    DF AUTOMATED     METABOLIC PANEL, COMPREHENSIVE    Collection Time: 02/27/21 10:52 AM   Result Value Ref Range    Sodium 145 136 - 145 mmol/L    Potassium 3.7 3.5 - 5.1 mmol/L    Chloride 111 (H) 97 - 108 mmol/L    CO2 32 21 - 32 mmol/L    Anion gap 2 (L) 5 - 15 mmol/L    Glucose 106 (H) 65 - 100 mg/dL    BUN 19 6 - 20 mg/dL    Creatinine 1.15 0.70 - 1.30 mg/dL    BUN/Creatinine ratio 17 12 - 20      GFR est AA >60 >60 ml/min/1.73m2    GFR est non-AA >60 >60 ml/min/1.73m2    Calcium 8.8 8.5 - 10.1 mg/dL    Bilirubin, total 1.4 (H) 0.2 - 1.0 mg/dL    AST (SGOT) 31 15 - 37 U/L    ALT (SGPT) 25 12 - 78 U/L    Alk.  phosphatase 86 45 - 117 U/L    Protein, total 7.6 6.4 - 8.2 g/dL    Albumin 3.2 (L) 3.5 - 5.0 g/dL    Globulin 4.4 (H) 2.0 - 4.0 g/dL    A-G Ratio 0.7 (L) 1.1 - 2.2     ECHO ADULT COMPLETE    Collection Time: 02/27/21  3:37 PM   Result Value Ref Range    Pulmonic Regurgitant End Max Velocity 122.00 cm/s    AoV PG 6.00 mmHg    IVSd 1.77 (A) 0.6 - 1.0 cm    LVIDd 3.86 (A) 4.2 - 5.9 cm    LVIDs 2.84 cm    Pulmonic Regurgitant End Max Velocity 74.00 cm/s    LVOT Peak Gradient 2.00 mmHg    LVPWd 1.55 (A) 0.6 - 1.0 cm    LV E' Septal Velocity 6.08 cm/s    LV ED Vol A2C 57.50 cm3    BP EF 60.2 55 - 100 %    LV ES Vol A2C 22.90 cm3    E/E' septal 11.56     LV Ejection Fraction MOD 2C 26 %    Mitral Valve E Wave Deceleration Time 85.00 ms    MV A Lew 102.00 cm/s    MV E Lew 70.30 cm/s    MV E/A 0.69     Pulmonic Regurgitant End Max Velocity 116.00 cm/s    Pulmonic Valve Systolic Peak Instantaneous Gradient 5.00 mmHg    P Vein A Dur 102.00 ms    Pulmonary Vein \"A\" Wave Velocity 25.70 cm/s    LV Mass .7 88 - 224 g    LV Mass AL Index 113.3 49 - 115 g/m2         CT ABD PELV W CONT   Final Result   1. No bowel obstruction. 2. Atherosclerotic disease of mesenteric inflow, without demonstrated untoward   downstream sequela at this time. 3. Coronary artery calcific atherosclerotic disease. XR CHEST SNGL V   Final Result   No significant change. XR CHEST SNGL V   Final Result   Asymmetric bilateral airspace disease greater on the right   suspicious for atypical pneumonia. Prominence of the right pulmonary hilum of   uncertain significance. CTA of the chest may be helpful for further evaluation. XR ABD (AP AND ERECT OR DECUB)   Final Result      Mildly dilated small bowel loops, probably grossly stable compared to the prior   CT. This may represent enteritis or mild ileus, although partial small bowel   obstruction cannot be absolutely excluded. Follow-up as clinically indicated. Assessment:  Problem List Items Addressed This Visit     None      Visit Diagnoses     Partial intestinal obstruction, unspecified cause ([address]    -  Primary       Resolved bowel obstruction    Plan:    1. Admission  2. Diet: Clears  3. IV fluids  4. SCD  5. IS  6. Pain medications  7. Antibiotics  8. Nausea medication  9. D/C NG   10. Labs in am  11. Consult: Cardiology eval appreciated. 12. Plan discussed with patient and family and answered all their questions. Thank you for allowing me to participate in the care of this patient.

## 2021-02-28 NOTE — PROGRESS NOTES
[id number] [facility] SURGERY PROGRESS NOTES    Chief Complaint[de-identified] None      Subjective:  Feels better. No abdominal pain. Passing flatus. No BM. CT scan with PO & IV contrast shows no bowel obstruction or any acute process. Tolerating clears. Review of Systems:   Constitutional:  no fever, no chills,  no sweats, No weakness, No fatigue, No decreased activity. Respiratory: No shortness of breath, No cough, No sputum production, No hemoptysis, No wheezing, No cyanosis. Cardiovascular: No chest pain, No palpitations, No bradycardia, No tachycardia, No peripheral edema, No syncope. Gastrointestinal: No nausea, No vomiting, No diarrhea, No constipation, No heartburn,  abdominal pain. Genitourinary: No dysuria, No hematuria, No change in urine stream, No urethral discharge, No lesions. Hematology/Lymphatics: No bruising tendency, No bleeding tendency, No petechiae, No swollen lymph glands. Endocrine: No excessive thirst, No polyuria, No cold intolerance, No heat intolerance, No excessive hunger. Musculoskeletal: No back pain, No neck pain, No joint pain, No muscle pain, No claudication, No decreased range of motion, No trauma. Integumentary: No rash, No pruritus, No abrasions. Neurologic: Alert and oriented X4, No abnormal balance, No headache, No confusion, No numbness, No tingling. Psychiatric: No anxiety, No depression, No winnie. Physical Exam:     Vitals & Measurements:     Wt Readings from Last 3 Encounters:   02/24/21 117 kg (258 lb)   12/09/20 113.4 kg (250 lb)     Temp Readings from Last 3 Encounters:   02/28/21 98.1 °F (36.7 °C)   02/24/21 98.4 °F (36.9 °C)     BP Readings from Last 3 Encounters:   02/28/21 133/72   02/24/21 (!) 165/82     Pulse Readings from Last 3 Encounters:   02/28/21 84   02/24/21 89      Ht Readings from Last 3 Encounters:   02/24/21 5' 9\" (1.753 m)   12/09/20 5' 9\" (1.753 m)      Date 02/27/21 0700 - 02/28/21 0659 02/28/21 0700 - 03/01/21 0659   Shift 7890-3402 1063-3370 24 Hour Total 1701-7621 5314-1804 24 Hour Total   INTAKE   Shift Total         OUTPUT   Urine 835  835        Urine Voided 835  835      Shift Total 835  835      NET -835  -835      Weight (kg)             General: well appearing, no acute distress  Head: Normal  Face: Nornal  HEENT: atraumatic, PERRLA, moist mucosa, normal pharynx, normal tonsils and adenoids, normal tongue, no fluid in sinuses  Neck: Trachea midline, no carotid bruit, no masses  Chest: Normal.  Respiratory: normal chest wall expansion, CTA B, no r/r/w, no rubs  Cardiovascular: RRR, no m/r/g, Normal S1 and S2  Abdomen: Soft, non tender, no guarding or rebound, non distended, normal bowel sounds in all quadrants, no hepatosplenomegaly, no tympany. Incision scar: Long midline   Genitourinary: No inguinal hernia, normal external gentalia, Testis & scrotum normal, no renal angle tenderness  Rectal: deferred  Musculoskeletal: Normal ROM in upper and lower extremities, No joint swelling. Integumentary: Warm, dry, and pink, with no rash, purpura, or petechia  Heme/Lymph: No lymphadenopathy, no bruises  Neurological: Cranial Nerves II-XII grossly intact, No gross sensory or motor deficit. Psychiatric: Cooperative with normal mood, affect, and cognition    Laboratory Values:   No results found for this or any previous visit (from the past 24 hour(s)). CT ABD PELV W CONT   Final Result   1. No bowel obstruction. 2. Atherosclerotic disease of mesenteric inflow, without demonstrated untoward   downstream sequela at this time. 3. Coronary artery calcific atherosclerotic disease. XR CHEST SNGL V   Final Result   No significant change. XR CHEST SNGL V   Final Result   Asymmetric bilateral airspace disease greater on the right   suspicious for atypical pneumonia. Prominence of the right pulmonary hilum of   uncertain significance. CTA of the chest may be helpful for further evaluation.       XR ABD (AP AND ERECT OR DECUB) Final Result      Mildly dilated small bowel loops, probably grossly stable compared to the prior   CT. This may represent enteritis or mild ileus, although partial small bowel   obstruction cannot be absolutely excluded. Follow-up as clinically indicated. Assessment:  Problem List Items Addressed This Visit     None      Visit Diagnoses     Partial intestinal obstruction, unspecified cause ([facility] [address]    -  Primary       Resolved bowel obstruction    Plan:    1. Admission  2. Diet: full liquids. 3. IV fluids  4. SCD  5. IS  6. Pain medications  7. Antibiotics  8. Nausea medication  9. Labs in am  10. Plan discussed with patient and family and answered all their questions. Thank you for allowing me to participate in the care of this patient.

## 2021-02-28 NOTE — PROGRESS NOTES
PROGRESS NOTE - CARDIOLOGY    CHIEF COMPLAINT:  F/u abdominal pain and poor appetite    HISTORY OF PRESENT ILLNESS / OVERNIGHT EVENTS  On my arrival this afternoon patient eating well. NG tube removed. CT showed no SBO or acute process. Patient with no abdominal pain this afternoon. He is very happy. No chest pain.     MEDICATIONS/PMHx    Current Facility-Administered Medications:     atorvastatin (LIPITOR) tablet 40 mg, 40 mg, Oral, QHS, Emeterio Mccracken NP    [START ON 3/1/2021] clopidogreL (PLAVIX) tablet 75 mg, 75 mg, Oral, DAILY, Emeterio Mccracken NP    furosemide (LASIX) tablet 20 mg, 20 mg, Oral, DAILY, Emeterio Mccracken NP, 20 mg at 02/28/21 1227    isosorbide mononitrate ER (IMDUR) tablet 60 mg, 60 mg, Oral, DAILY, Emeterio Mccracken NP, 60 mg at 02/28/21 1227    metoprolol tartrate (LOPRESSOR) tablet 100 mg, 100 mg, Oral, BID, Emeterio Mccracken NP, 100 mg at 02/28/21 1506    losartan (COZAAR) tablet 50 mg, 50 mg, Oral, DAILY, Emeterio Mccracken NP, 50 mg at 02/28/21 1227    traZODone (DESYREL) tablet 50 mg, 50 mg, Oral, QHS PRN, Emeterio Mccracken NP    [START ON 3/1/2021] famotidine (PEPCID) tablet 20 mg, 20 mg, Oral, DAILY, Emeterio Mccracken NP    [START ON 3/1/2021] polyethylene glycol (MIRALAX) packet 17 g, 17 g, Oral, DAILY, Emeterio Mccracken NP    heparin (porcine) injection 5,000 Units, 5,000 Units, SubCUTAneous, Q12H, Emeterio Mccracken NP    morphine injection 2 mg, 2 mg, IntraVENous, Q4H PRN, Marry Olivo MD, 2 mg at 02/28/21 1652    benzocaine-menthoL (CEPACOL) lozenge 1 Lozenge, 1 Lozenge, Mucous Membrane, Q2H PRN, Emeterio Mccracken NP, 1 Lozenge at 02/27/21 1705    labetaloL (NORMODYNE;TRANDATE) injection 20 mg, 20 mg, IntraVENous, Q4H PRN, Emeterio Mccracken NP, 20 mg at 02/27/21 2023    nitroglycerin (NITROBID) 2 % ointment 0.5 Inch, 0.5 Inch, Topical, Q6H PRN, Emeterio Mccracken, KWABENA, 0.5 Inch at 02/27/21 1636    sodium chloride (NS) flush 5-40 mL, 5-40 mL, IntraVENous, Q8H, Emeterio Mccracken, KWABENA, 10 mL at 02/28/21 1508    acetaminophen (TYLENOL) tablet 650 mg, 650 mg, Oral, Q6H PRN, 650 mg at 02/27/21 2025 **OR** acetaminophen (TYLENOL) suppository 650 mg, 650 mg, Rectal, Q6H PRN, Nawaf Emeterio A, NP    promethazine (PHENERGAN) tablet 12.5 mg, 12.5 mg, Oral, Q6H PRN **OR** ondansetron (ZOFRAN) injection 4 mg, 4 mg, IntraVENous, Q6H PRN, Nawaf Emeterio RIVERA, NP, 4 mg at 02/25/21 1442    melatonin tablet 5 mg, 5 mg, Oral, QHS PRN, Nawaf Emeterio RIVERA, NP    PHYSICAL EXAMINATION  Visit Vitals  /72   Pulse 84   Temp 98.1 °F (36.7 °C)   Resp 16   SpO2 97%     General: no acute distress  HEENT/neck: no JVD, no masses, trachea midline. Pulmonary: clear to ausculation bilaterally with good air movement  Cardiovascular: regular rate, regular rhythm; no murmurs, rubs or gallops. Normal point of maximal impulse. No peripheral edema. GI: soft, nontender, no hepatosplenomegaly  Hematology/Oncology: no lymphadenopathy; no bruising  Skin: warm and dry, no rashes, lesions, or ulcer  Musculoskeletal: Moving all four extremities. Normal gait and station. MEDICAL DECISION MAKING  Telemetry independently reviewed, my impression: no malignant arrhythmia. IMPRESSION/PLAN    Small bowel obstruction :(partial)  Acute kidney injury stage I secondary to prerenal azotemia  CAD, stable  Hypertension, uncontrolled -- now at goal    Patient doing well from CV perspective. Would continue all current meds. Likely discharge in morning. Encouraged by progress. All questions answered. F/u with Rashid in ~1 month. Will carefully evaluation BP at that time when on stable diet.  -DT

## 2021-02-28 NOTE — PROGRESS NOTES
Hospitalist Progress Note    Subjective:   Daily Progress Note: 2/28/2021 2:46 PM    Hospital Course:   Miley Alanis is a 77 y.o. male with past medical history of coronary artery disease, hypertension, hyperlipidemia and previous small bowel obstruction (approximately 40 years ago) presenting to the ER with complaints of abdominal pain over the past 24 hours. Pain is described as an aching pain that has been constant since onset. Mr. Thierry Zuleta has had associated nausea with nonbloody, nonbilious emesis. Mr. Thierry Zuleta has not passed gas in 24 hours and last bowel movement was 2 days ago. Patient reports that he had to strain with last bowel movement and he passed little stool. Yesterday afternoon, his abdomen became more distended and pain intensified, described as 10 out of 10. Symptoms prompted ER visit last evening. Subjective: Pt seen in room, NG discontinued, no abdominal pain, no nausea/vomiting.     Current Facility-Administered Medications   Medication Dose Route Frequency    atorvastatin (LIPITOR) tablet 40 mg  40 mg Oral QHS    [START ON 3/1/2021] clopidogreL (PLAVIX) tablet 75 mg  75 mg Oral DAILY    furosemide (LASIX) tablet 20 mg  20 mg Oral DAILY    isosorbide mononitrate ER (IMDUR) tablet 60 mg  60 mg Oral DAILY    metoprolol tartrate (LOPRESSOR) tablet 100 mg  100 mg Oral BID    losartan (COZAAR) tablet 50 mg  50 mg Oral DAILY    traZODone (DESYREL) tablet 50 mg  50 mg Oral QHS PRN    morphine injection 2 mg  2 mg IntraVENous Q4H PRN    benzocaine-menthoL (CEPACOL) lozenge 1 Lozenge  1 Lozenge Mucous Membrane Q2H PRN    labetaloL (NORMODYNE;TRANDATE) injection 20 mg  20 mg IntraVENous Q4H PRN    nitroglycerin (NITROBID) 2 % ointment 0.5 Inch  0.5 Inch Topical Q6H PRN    famotidine (PF) (PEPCID) 20 mg in 0.9% sodium chloride 10 mL injection  20 mg IntraVENous Q12H    sodium chloride (NS) flush 5-40 mL  5-40 mL IntraVENous Q8H    acetaminophen (TYLENOL) tablet 650 mg  650 mg Oral Q6H PRN    Or    acetaminophen (TYLENOL) suppository 650 mg  650 mg Rectal Q6H PRN    promethazine (PHENERGAN) tablet 12.5 mg  12.5 mg Oral Q6H PRN    Or    ondansetron (ZOFRAN) injection 4 mg  4 mg IntraVENous Q6H PRN    0.9% sodium chloride infusion  50 mL/hr IntraVENous CONTINUOUS    melatonin tablet 5 mg  5 mg Oral QHS PRN        Review of Systems:    Review of Systems   Constitutional: Negative for fever. HENT: Negative for congestion and sore throat. Respiratory: Negative for cough and shortness of breath. Cardiovascular: Negative for chest pain and palpitations. Gastrointestinal: Negative for abdominal pain, heartburn, nausea and vomiting. Genitourinary: Negative for dysuria and urgency. Neurological: Negative for dizziness and headaches. Objective:     Visit Vitals  /75   Pulse (!) 103   Temp 98.2 °F (36.8 °C)   Resp 16   SpO2 94%      O2 Device: Room air    Temp (24hrs), Av.1 °F (37.3 °C), Min:98.2 °F (36.8 °C), Max:100.9 °F (38.3 °C)      No intake/output data recorded.  1901 -  0700  In: -   Out:  [[id number]:[id number]]    PHYSICAL EXAM:    Physical Exam   Constitutional: He is oriented to person, place, and time. He appears well-developed. No distress. Neck: Normal range of motion. Neck supple. Cardiovascular: Normal rate, regular rhythm, normal heart sounds and intact distal pulses. Pulmonary/Chest: Effort normal and breath sounds normal.   Abdominal: Soft. Musculoskeletal: Normal range of motion. Neurological: He is alert and oriented to person, place, and time. Skin: Skin is warm and dry. Psychiatric: He has a normal mood and affect.  His behavior is normal.          Data Review    Recent Results (from the past 24 hour(s))   ECHO ADULT COMPLETE    Collection Time: 21  3:37 PM   Result Value Ref Range    Pulmonic Regurgitant End Max Velocity 122.00 cm/s    AoV PG 6.00 mmHg    IVSd 1.77 (A) 0.6 - 1.0 cm    LVIDd 3.86 (A) 4.2 - 5.9 cm    LVIDs 2.84 cm    Pulmonic Regurgitant End Max Velocity 74.00 cm/s    LVOT Peak Gradient 2.00 mmHg    LVPWd 1.55 (A) 0.6 - 1.0 cm    LV E' Septal Velocity 6.08 cm/s    LV ED Vol A2C 57.50 cm3    BP EF 60.2 55 - 100 %    LV ES Vol A2C 22.90 cm3    E/E' septal 11.56     LV Ejection Fraction MOD 2C 26 %    Mitral Valve E Wave Deceleration Time 85.00 ms    MV A Lew 102.00 cm/s    MV E Lew 70.30 cm/s    MV E/A 0.69     Pulmonic Regurgitant End Max Velocity 116.00 cm/s    Pulmonic Valve Systolic Peak Instantaneous Gradient 5.00 mmHg    P Vein A Dur 102.00 ms    Pulmonary Vein \"A\" Wave Velocity 25.70 cm/s    LV Mass .7 88 - 224 g    LV Mass AL Index 113.3 49 - 115 g/m2       CT ABD PELV W CONT   Final Result   1. No bowel obstruction. 2. Atherosclerotic disease of mesenteric inflow, without demonstrated untoward   downstream sequela at this time. 3. Coronary artery calcific atherosclerotic disease. XR CHEST SNGL V   Final Result   No significant change. XR CHEST SNGL V   Final Result   Asymmetric bilateral airspace disease greater on the right   suspicious for atypical pneumonia. Prominence of the right pulmonary hilum of   uncertain significance. CTA of the chest may be helpful for further evaluation. XR ABD (AP AND ERECT OR DECUB)   Final Result      Mildly dilated small bowel loops, probably grossly stable compared to the prior   CT. This may represent enteritis or mild ileus, although partial small bowel   obstruction cannot be absolutely excluded. Follow-up as clinically indicated. Active Problems:    Small bowel obstruction ([address] (2/25/2021)        Assessment/Plan:   1. SBO- repeat CT scan not showing obstruction, NG d/c'd, advance diet as tolerated,  gen surgery following. 2. Hypertension- controlled, continue home medications    3. Discharge- possible next 24-48 hrs depending on clinical course.         DVT Prophylaxis: heparin sq  Code Status:  Full Code  POA:    Care Plan discussed with:   _____patient, staff nurse__________________________________________________________    Tej Bender NP

## 2021-03-01 VITALS
RESPIRATION RATE: 16 BRPM | OXYGEN SATURATION: 96 % | SYSTOLIC BLOOD PRESSURE: 120 MMHG | TEMPERATURE: 98.3 F | DIASTOLIC BLOOD PRESSURE: 72 MMHG | HEART RATE: 97 BPM

## 2021-03-01 LAB
ALBUMIN SERPL-MCNC: 3 G/DL (ref 3.5–5)
ALBUMIN/GLOB SERPL: 0.8 {RATIO} (ref 1.1–2.2)
ALP SERPL-CCNC: 68 U/L (ref 45–117)
ALT SERPL-CCNC: 30 U/L (ref 12–78)
ANION GAP SERPL CALC-SCNC: 7 MMOL/L (ref 5–15)
AST SERPL W P-5'-P-CCNC: 46 U/L (ref 15–37)
BASOPHILS # BLD: 0 K/UL (ref 0–0.1)
BASOPHILS NFR BLD: 1 % (ref 0–1)
BILIRUB SERPL-MCNC: 1.1 MG/DL (ref 0.2–1)
BUN SERPL-MCNC: 12 MG/DL (ref 6–20)
BUN/CREAT SERPL: 10 (ref 12–20)
CA-I BLD-MCNC: 8.6 MG/DL (ref 8.5–10.1)
CHLORIDE SERPL-SCNC: 104 MMOL/L (ref 97–108)
CO2 SERPL-SCNC: 25 MMOL/L (ref 21–32)
CREAT SERPL-MCNC: 1.17 MG/DL (ref 0.7–1.3)
DIFFERENTIAL METHOD BLD: ABNORMAL
EOSINOPHIL # BLD: 0.6 K/UL (ref 0–0.4)
EOSINOPHIL NFR BLD: 9 % (ref 0–7)
ERYTHROCYTE [DISTWIDTH] IN BLOOD BY AUTOMATED COUNT: 14.6 % (ref 11.5–14.5)
GLOBULIN SER CALC-MCNC: 4 G/DL (ref 2–4)
GLUCOSE SERPL-MCNC: 200 MG/DL (ref 65–100)
HCT VFR BLD AUTO: 37.3 % (ref 36.6–50.3)
HGB BLD-MCNC: 11.6 G/DL (ref 12.1–17)
IMM GRANULOCYTES # BLD AUTO: 0 K/UL (ref 0–0.04)
IMM GRANULOCYTES NFR BLD AUTO: 0 % (ref 0–0.5)
LYMPHOCYTES # BLD: 1.5 K/UL (ref 0.8–3.5)
LYMPHOCYTES NFR BLD: 23 % (ref 12–49)
MCH RBC QN AUTO: 27.8 PG (ref 26–34)
MCHC RBC AUTO-ENTMCNC: 31.1 G/DL (ref 30–36.5)
MCV RBC AUTO: 89.2 FL (ref 80–99)
MONOCYTES # BLD: 0.7 K/UL (ref 0–1)
MONOCYTES NFR BLD: 11 % (ref 5–13)
NEUTS SEG # BLD: 3.7 K/UL (ref 1.8–8)
NEUTS SEG NFR BLD: 56 % (ref 32–75)
PLATELET # BLD AUTO: 193 K/UL (ref 150–400)
PMV BLD AUTO: 10.8 FL (ref 8.9–12.9)
POTASSIUM SERPL-SCNC: 3.4 MMOL/L (ref 3.5–5.1)
PROT SERPL-MCNC: 7 G/DL (ref 6.4–8.2)
RBC # BLD AUTO: 4.18 M/UL (ref 4.1–5.7)
SODIUM SERPL-SCNC: 136 MMOL/L (ref 136–145)
WBC # BLD AUTO: 6.6 K/UL (ref 4.1–11.1)

## 2021-03-01 PROCEDURE — 80053 COMPREHEN METABOLIC PANEL: CPT

## 2021-03-01 PROCEDURE — 85025 COMPLETE CBC W/AUTO DIFF WBC: CPT

## 2021-03-01 PROCEDURE — 74011250637 HC RX REV CODE- 250/637: Performed by: NURSE PRACTITIONER

## 2021-03-01 PROCEDURE — 74011250636 HC RX REV CODE- 250/636: Performed by: NURSE PRACTITIONER

## 2021-03-01 PROCEDURE — 36415 COLL VENOUS BLD VENIPUNCTURE: CPT

## 2021-03-01 RX ORDER — MORPHINE SULFATE 2 MG/ML
2 INJECTION, SOLUTION INTRAMUSCULAR; INTRAVENOUS
Status: DISCONTINUED | OUTPATIENT
Start: 2021-03-01 | End: 2021-03-01 | Stop reason: HOSPADM

## 2021-03-01 RX ORDER — FAMOTIDINE 20 MG/1
20 TABLET, FILM COATED ORAL
Qty: 30 TAB | Refills: 1 | Status: SHIPPED | OUTPATIENT
Start: 2021-03-01 | End: 2022-06-17

## 2021-03-01 RX ADMIN — FAMOTIDINE 20 MG: 20 TABLET, FILM COATED ORAL at 09:25

## 2021-03-01 RX ADMIN — MORPHINE SULFATE 2 MG: 2 INJECTION, SOLUTION INTRAMUSCULAR; INTRAVENOUS at 05:38

## 2021-03-01 RX ADMIN — METOPROLOL TARTRATE 100 MG: 50 TABLET, FILM COATED ORAL at 09:32

## 2021-03-01 RX ADMIN — ISOSORBIDE MONONITRATE 60 MG: 60 TABLET, EXTENDED RELEASE ORAL at 09:26

## 2021-03-01 RX ADMIN — MORPHINE SULFATE 2 MG: 2 INJECTION, SOLUTION INTRAMUSCULAR; INTRAVENOUS at 09:30

## 2021-03-01 RX ADMIN — CLOPIDOGREL BISULFATE 75 MG: 75 TABLET ORAL at 09:25

## 2021-03-01 RX ADMIN — FUROSEMIDE 20 MG: 40 TABLET ORAL at 09:26

## 2021-03-01 RX ADMIN — HEPARIN SODIUM 5000 UNITS: 5000 INJECTION INTRAVENOUS; SUBCUTANEOUS at 09:26

## 2021-03-01 RX ADMIN — POLYETHYLENE GLYCOL 3350 17 G: 17 POWDER, FOR SOLUTION ORAL at 09:26

## 2021-03-01 RX ADMIN — LOSARTAN POTASSIUM 50 MG: 50 TABLET, FILM COATED ORAL at 09:26

## 2021-03-01 NOTE — DISCHARGE SUMMARY
Hospitalist Discharge Summary     Patient ID:    Juani Nava  [id number]  66 y.o.  1945    Admit date: 2/24/2021    Discharge date : 3/1/2021    Chronic Diagnoses:    Problem List as of 3/1/2021 Date Reviewed: 12/11/2020          Codes Class Noted - Resolved    Small bowel obstruction ([facility] [address] ICD-10-CM: P75.103  ICD-9-CM: 560.9  2/25/2021 - Present          22    Final Diagnoses: Active Problems:    Small bowel obstruction ([address] (2/25/2021)      Hospital Course:   Ollie Valle a 71-year-old AA male with past medical history of coronary artery disease s/p CABG, hypertension, hyperlipidemia and previous small bowel obstruction (approximately 40 years ago) presenting to the ER with complaints of sudden onset abdominal pain. Pain is described as an aching pain that has been constant since onset. Mr. Melissa Dye had associated nausea with nonbloody, nonbilious emesis.  Patient states he was unable to pass gas 24 hours prior to presentation and hist last bowel movement was 2 days ago prior.  Patient reports that he had to strain with last bowel movement and he passed little stool.       In the ED, significant labs showed creatinine 1.47. Initial CT of abdomen showed small air-fluid filled loops of bowel with differential including low-grade SBR, ileus, or enteritis. NG tube placed and hooked up to continuous suction. Surgery consulted. Patient admitted by our service for small bowel obstruction. Started on GI prophylaxis with IV famotidine and PRN pain management. Cardiology consulted for perioperative cardiovascular risk assessment. 2D echocardiogram showed LVEF 52%, severe concentric hypertrophy, and grade 1 LV diastolic dysfunction. Cardiology recommending outpatient f/u with Dr. Barbara Black in 1 month. Repeat CT abdomen showing now bowel obstruction. NG tube removed. Patient passing gas. He is cleared by surgery for discharge home and outpatient follow-up in 1 week.  Vitals and labs stable on date of discharge. Discharge Medications:   Current Discharge Medication List      START taking these medications    Details   famotidine (PEPCID) 20 mg tablet Take 1 Tab by mouth nightly. Qty: 30 Tab, Refills: 1         CONTINUE these medications which have NOT CHANGED    Details   traZODone (DESYREL) 50 mg tablet Take 50 mg by mouth nightly as needed for Insomnia. clopidogreL (PLAVIX) 75 mg tab Take 75 mg by mouth daily. nitroglycerin (NITROLINGUAL) 400 mcg/spray spray 1 Spray by SubLINGual route every five (5) minutes as needed for Chest Pain. furosemide (LASIX) 40 mg tablet Take  by mouth daily. naproxen (NAPROSYN) 500 mg tablet Take 500 mg by mouth two (2) times daily (with meals). olmesartan (BENICAR) 20 mg tablet Take 20 mg by mouth daily. atorvastatin (LIPITOR) 40 mg tablet Take  by mouth daily. metoprolol tartrate (LOPRESSOR) 100 mg IR tablet Take  by mouth two (2) times a day. isosorbide mononitrate ER (IMDUR) 60 mg CR tablet Take  by mouth every morning. Follow up Care:    1. Kaylan Henao MD in 1-2 weeks. Follow-up Information     Follow up With Specialties Details Why Contact Info    Tanna Estevez MD Cardiology In 1 month Cardiology follow-up [address]      Kaylan Henao MD Family Medicine  As needed [address] [phone], Giovanny Francis MD General Surgery In 1 week Hospital follow-up for small bowel obstruction [place] [phone],[address]  [phone]              Patient Follow Up Instructions:    Activity: Activity as tolerated  Diet:  Cardiac Diet    Condition at Discharge:  Stable  __________________________________________________________________    Disposition  Home or Self Care  ____________________________________________________________________    Code Status:  Full Code  ___________________________________________________________________    Discharge Exam:  Patient seen and examined by me on discharge day. Pertinent Findings:    Gen:    Obese AA male, resting comfortably. Not in distress  Chest: Clear lungs without wheezing, rhonchi, or rales. On room air. CVS:   Regular rate and rhythm. Normal S1/S2. No murmur. No peripheral edema  Abd:  Soft, mildly distended, not tender. Normal bowel sounds. Neuro:  Alert and oriented x3. CONSULTATIONS: Cardiology and General Surgery    Significant Diagnostic Studies:   No results found for this or any previous visit (from the past 24 hour(s)). CT ABD PELV W CONT   Final Result   1. No bowel obstruction. 2. Atherosclerotic disease of mesenteric inflow, without demonstrated untoward   downstream sequela at this time. 3. Coronary artery calcific atherosclerotic disease. XR CHEST SNGL V   Final Result   No significant change. XR CHEST SNGL V   Final Result   Asymmetric bilateral airspace disease greater on the right   suspicious for atypical pneumonia. Prominence of the right pulmonary hilum of   uncertain significance. CTA of the chest may be helpful for further evaluation. XR ABD (AP AND ERECT OR DECUB)   Final Result      Mildly dilated small bowel loops, probably grossly stable compared to the prior   CT. This may represent enteritis or mild ileus, although partial small bowel   obstruction cannot be absolutely excluded. Follow-up as clinically indicated.               Signed:  Gallo Younger PA-C  3/1/2021  8:53 AM

## 2021-03-01 NOTE — PROGRESS NOTES
Patient was given discharge instructions and he verbalized understanding. Patient was alert and oriented X4 with no distress noted. Patient was discharged home, his transportation was his son. Discharge plan of care/case management plan validated with provider discharge order.

## 2021-03-05 ENCOUNTER — APPOINTMENT (OUTPATIENT)
Dept: GENERAL RADIOLOGY | Age: 76
DRG: 393 | End: 2021-03-05
Attending: EMERGENCY MEDICINE
Payer: MEDICARE

## 2021-03-05 ENCOUNTER — HOSPITAL ENCOUNTER (EMERGENCY)
Age: 76
Discharge: ANOTHER HOSPITAL | End: 2021-03-05
Attending: EMERGENCY MEDICINE | Admitting: EMERGENCY MEDICINE
Payer: MEDICARE

## 2021-03-05 ENCOUNTER — HOSPITAL ENCOUNTER (INPATIENT)
Age: 76
LOS: 4 days | Discharge: HOME | DRG: 393 | End: 2021-03-09
Attending: EMERGENCY MEDICINE | Admitting: INTERNAL MEDICINE
Payer: MEDICARE

## 2021-03-05 ENCOUNTER — APPOINTMENT (OUTPATIENT)
Dept: CT IMAGING | Age: 76
End: 2021-03-05
Attending: EMERGENCY MEDICINE
Payer: MEDICARE

## 2021-03-05 VITALS
DIASTOLIC BLOOD PRESSURE: 79 MMHG | TEMPERATURE: 98.3 F | BODY MASS INDEX: 38.21 KG/M2 | RESPIRATION RATE: 20 BRPM | HEART RATE: 94 BPM | WEIGHT: 258 LBS | HEIGHT: 69 IN | OXYGEN SATURATION: 95 % | SYSTOLIC BLOOD PRESSURE: 132 MMHG

## 2021-03-05 DIAGNOSIS — K62.5 GASTROINTESTINAL HEMORRHAGE ASSOCIATED WITH ANORECTAL SOURCE: Primary | ICD-10-CM

## 2021-03-05 DIAGNOSIS — K92.2 GASTROINTESTINAL HEMORRHAGE, UNSPECIFIED GASTROINTESTINAL HEMORRHAGE TYPE: Primary | ICD-10-CM

## 2021-03-05 PROBLEM — K59.09 CHRONIC CONSTIPATION: Status: ACTIVE | Noted: 2021-03-05

## 2021-03-05 PROBLEM — K59.00 CONSTIPATION: Status: ACTIVE | Noted: 2021-03-05

## 2021-03-05 PROBLEM — R10.9 ABDOMINAL PAIN: Status: ACTIVE | Noted: 2021-03-05

## 2021-03-05 LAB
ABO + RH BLD: NORMAL
ALBUMIN SERPL-MCNC: 3.2 G/DL (ref 3.5–5)
ALBUMIN/GLOB SERPL: 0.9 {RATIO} (ref 1.1–2.2)
ALP SERPL-CCNC: 60 U/L (ref 45–117)
ALT SERPL-CCNC: 37 U/L (ref 12–78)
ANION GAP SERPL CALC-SCNC: 9 MMOL/L (ref 5–15)
AST SERPL W P-5'-P-CCNC: 28 U/L (ref 15–37)
BASOPHILS # BLD: 0.1 K/UL (ref 0–0.1)
BASOPHILS # BLD: 0.1 K/UL (ref 0–0.2)
BASOPHILS NFR BLD: 1 % (ref 0–1)
BASOPHILS NFR BLD: 1 % (ref 0–2.5)
BILIRUB SERPL-MCNC: 0.4 MG/DL (ref 0.2–1)
BLOOD GROUP ANTIBODIES SERPL: NEGATIVE
BUN SERPL-MCNC: 27 MG/DL (ref 6–20)
BUN/CREAT SERPL: 19 (ref 12–20)
CA-I BLD-MCNC: 9 MG/DL (ref 8.5–10.1)
CHLORIDE SERPL-SCNC: 105 MMOL/L (ref 97–108)
CO2 SERPL-SCNC: 26 MMOL/L (ref 21–32)
CREAT SERPL-MCNC: 1.39 MG/DL (ref 0.7–1.3)
DIFFERENTIAL METHOD BLD: ABNORMAL
EOSINOPHIL # BLD: 0.4 K/UL (ref 0–0.4)
EOSINOPHIL # BLD: 0.4 K/UL (ref 0–0.7)
EOSINOPHIL NFR BLD: 4 % (ref 0–7)
EOSINOPHIL NFR BLD: 5 % (ref 0.9–2.9)
ERYTHROCYTE [DISTWIDTH] IN BLOOD BY AUTOMATED COUNT: 14.5 % (ref 11.5–14.5)
ERYTHROCYTE [DISTWIDTH] IN BLOOD BY AUTOMATED COUNT: 14.9 % (ref 11.5–14.5)
GLOBULIN SER CALC-MCNC: 3.6 G/DL (ref 2–4)
GLUCOSE SERPL-MCNC: 136 MG/DL (ref 65–100)
HCT VFR BLD AUTO: 28.1 % (ref 41–53)
HCT VFR BLD AUTO: 28.6 % (ref 36.6–50.3)
HEMOCCULT SP1 STL QL: POSITIVE
HGB BLD-MCNC: 8.9 G/DL (ref 12.1–17)
HGB BLD-MCNC: 9.3 G/DL (ref 13.5–17.5)
IMM GRANULOCYTES # BLD AUTO: 0.1 K/UL (ref 0–0.04)
IMM GRANULOCYTES NFR BLD AUTO: 1 % (ref 0–0.5)
INR PPP: 1.1 (ref 0.9–1.1)
LIPASE SERPL-CCNC: 215 U/L (ref 73–393)
LYMPHOCYTES # BLD: 2.1 K/UL (ref 0.8–3.5)
LYMPHOCYTES # BLD: 2.2 K/UL (ref 1–4.8)
LYMPHOCYTES NFR BLD: 27 % (ref 12–49)
LYMPHOCYTES NFR BLD: 27 % (ref 20.5–51.1)
MCH RBC QN AUTO: 27.4 PG (ref 26–34)
MCH RBC QN AUTO: 27.9 PG (ref 31–34)
MCHC RBC AUTO-ENTMCNC: 31.1 G/DL (ref 30–36.5)
MCHC RBC AUTO-ENTMCNC: 33 G/DL (ref 31–36)
MCV RBC AUTO: 84.6 FL (ref 80–100)
MCV RBC AUTO: 88 FL (ref 80–99)
MONOCYTES # BLD: 0.6 K/UL (ref 0–1)
MONOCYTES # BLD: 0.8 K/UL (ref 0.2–2.4)
MONOCYTES NFR BLD: 10 % (ref 1.7–9.3)
MONOCYTES NFR BLD: 8 % (ref 5–13)
NEUTS SEG # BLD: 4.7 K/UL (ref 1.8–7.7)
NEUTS SEG # BLD: 4.8 K/UL (ref 1.8–8)
NEUTS SEG NFR BLD: 57 % (ref 42–75)
NEUTS SEG NFR BLD: 59 % (ref 32–75)
NRBC # BLD: 0 K/UL
NRBC BLD-RTO: 0 PER 100 WBC
PLATELET # BLD AUTO: 244 K/UL
PLATELET # BLD AUTO: 260 K/UL (ref 150–400)
PMV BLD AUTO: 10.6 FL (ref 8.9–12.9)
PMV BLD AUTO: 8.8 FL (ref 6.5–11.5)
POTASSIUM SERPL-SCNC: 3.7 MMOL/L (ref 3.5–5.1)
PROT SERPL-MCNC: 6.8 G/DL (ref 6.4–8.2)
PROTHROMBIN TIME: 10.4 SEC (ref 9–11.1)
RBC # BLD AUTO: 3.25 M/UL (ref 4.1–5.7)
RBC # BLD AUTO: 3.33 M/UL (ref 4.5–5.9)
SODIUM SERPL-SCNC: 140 MMOL/L (ref 136–145)
SPECIMEN EXP DATE BLD: NORMAL
TROPONIN I SERPL-MCNC: <0.05 NG/ML
WBC # BLD AUTO: 7.9 K/UL (ref 4.1–11.1)
WBC # BLD AUTO: 8.2 K/UL (ref 4.4–11.3)

## 2021-03-05 PROCEDURE — 86901 BLOOD TYPING SEROLOGIC RH(D): CPT

## 2021-03-05 PROCEDURE — 86923 COMPATIBILITY TEST ELECTRIC: CPT

## 2021-03-05 PROCEDURE — 36415 COLL VENOUS BLD VENIPUNCTURE: CPT

## 2021-03-05 PROCEDURE — C9113 INJ PANTOPRAZOLE SODIUM, VIA: HCPCS | Performed by: NURSE PRACTITIONER

## 2021-03-05 PROCEDURE — 51701 INSERT BLADDER CATHETER: CPT

## 2021-03-05 PROCEDURE — 99285 EMERGENCY DEPT VISIT HI MDM: CPT

## 2021-03-05 PROCEDURE — 99284 EMERGENCY DEPT VISIT MOD MDM: CPT

## 2021-03-05 PROCEDURE — 71045 X-RAY EXAM CHEST 1 VIEW: CPT

## 2021-03-05 PROCEDURE — 84484 ASSAY OF TROPONIN QUANT: CPT

## 2021-03-05 PROCEDURE — 85025 COMPLETE CBC W/AUTO DIFF WBC: CPT

## 2021-03-05 PROCEDURE — 74011000250 HC RX REV CODE- 250: Performed by: NURSE PRACTITIONER

## 2021-03-05 PROCEDURE — 83690 ASSAY OF LIPASE: CPT

## 2021-03-05 PROCEDURE — 74011250637 HC RX REV CODE- 250/637: Performed by: NURSE PRACTITIONER

## 2021-03-05 PROCEDURE — 80053 COMPREHEN METABOLIC PANEL: CPT

## 2021-03-05 PROCEDURE — 65270000029 HC RM PRIVATE

## 2021-03-05 PROCEDURE — 93005 ELECTROCARDIOGRAM TRACING: CPT

## 2021-03-05 PROCEDURE — 74176 CT ABD & PELVIS W/O CONTRAST: CPT

## 2021-03-05 PROCEDURE — 85610 PROTHROMBIN TIME: CPT

## 2021-03-05 PROCEDURE — 74011250636 HC RX REV CODE- 250/636: Performed by: NURSE PRACTITIONER

## 2021-03-05 PROCEDURE — 82272 OCCULT BLD FECES 1-3 TESTS: CPT

## 2021-03-05 RX ORDER — ACETAMINOPHEN 325 MG/1
650 TABLET ORAL
Status: DISCONTINUED | OUTPATIENT
Start: 2021-03-05 | End: 2021-03-09 | Stop reason: HOSPADM

## 2021-03-05 RX ORDER — ACETAMINOPHEN 650 MG/1
650 SUPPOSITORY RECTAL
Status: DISCONTINUED | OUTPATIENT
Start: 2021-03-05 | End: 2021-03-09 | Stop reason: HOSPADM

## 2021-03-05 RX ORDER — TRAZODONE HYDROCHLORIDE 50 MG/1
50 TABLET ORAL
Status: DISCONTINUED | OUTPATIENT
Start: 2021-03-05 | End: 2021-03-09 | Stop reason: HOSPADM

## 2021-03-05 RX ORDER — BISACODYL 5 MG
5 TABLET, DELAYED RELEASE (ENTERIC COATED) ORAL DAILY PRN
Status: DISCONTINUED | OUTPATIENT
Start: 2021-03-05 | End: 2021-03-09 | Stop reason: HOSPADM

## 2021-03-05 RX ORDER — MORPHINE SULFATE 2 MG/ML
2 INJECTION, SOLUTION INTRAMUSCULAR; INTRAVENOUS
Status: DISCONTINUED | OUTPATIENT
Start: 2021-03-05 | End: 2021-03-05

## 2021-03-05 RX ORDER — ATORVASTATIN CALCIUM 40 MG/1
40 TABLET, FILM COATED ORAL DAILY
Status: DISCONTINUED | OUTPATIENT
Start: 2021-03-06 | End: 2021-03-09 | Stop reason: HOSPADM

## 2021-03-05 RX ORDER — SODIUM CHLORIDE 0.9 % (FLUSH) 0.9 %
5-40 SYRINGE (ML) INJECTION AS NEEDED
Status: DISCONTINUED | OUTPATIENT
Start: 2021-03-05 | End: 2021-03-09 | Stop reason: HOSPADM

## 2021-03-05 RX ORDER — METOPROLOL TARTRATE 5 MG/5ML
2.5 INJECTION INTRAVENOUS EVERY 6 HOURS
Status: DISCONTINUED | OUTPATIENT
Start: 2021-03-05 | End: 2021-03-09 | Stop reason: HOSPADM

## 2021-03-05 RX ORDER — MORPHINE SULFATE 2 MG/ML
2 INJECTION, SOLUTION INTRAMUSCULAR; INTRAVENOUS
Status: DISCONTINUED | OUTPATIENT
Start: 2021-03-07 | End: 2021-03-05

## 2021-03-05 RX ORDER — ISOSORBIDE MONONITRATE 30 MG/1
60 TABLET, EXTENDED RELEASE ORAL
Status: DISCONTINUED | OUTPATIENT
Start: 2021-03-06 | End: 2021-03-09 | Stop reason: HOSPADM

## 2021-03-05 RX ORDER — MORPHINE SULFATE 2 MG/ML
2 INJECTION, SOLUTION INTRAMUSCULAR; INTRAVENOUS
Status: DISPENSED | OUTPATIENT
Start: 2021-03-05 | End: 2021-03-07

## 2021-03-05 RX ORDER — SODIUM CHLORIDE 0.9 % (FLUSH) 0.9 %
5-40 SYRINGE (ML) INJECTION EVERY 8 HOURS
Status: DISCONTINUED | OUTPATIENT
Start: 2021-03-05 | End: 2021-03-09 | Stop reason: HOSPADM

## 2021-03-05 RX ORDER — SODIUM CHLORIDE 9 MG/ML
125 INJECTION, SOLUTION INTRAVENOUS CONTINUOUS
Status: DISCONTINUED | OUTPATIENT
Start: 2021-03-05 | End: 2021-03-09 | Stop reason: HOSPADM

## 2021-03-05 RX ORDER — POLYETHYLENE GLYCOL 3350 17 G/17G
17 POWDER, FOR SOLUTION ORAL DAILY PRN
Status: DISCONTINUED | OUTPATIENT
Start: 2021-03-05 | End: 2021-03-09 | Stop reason: HOSPADM

## 2021-03-05 RX ORDER — ONDANSETRON 2 MG/ML
4 INJECTION INTRAMUSCULAR; INTRAVENOUS
Status: DISCONTINUED | OUTPATIENT
Start: 2021-03-05 | End: 2021-03-09 | Stop reason: HOSPADM

## 2021-03-05 RX ORDER — ONDANSETRON 4 MG/1
4 TABLET, ORALLY DISINTEGRATING ORAL
Status: DISCONTINUED | OUTPATIENT
Start: 2021-03-05 | End: 2021-03-09 | Stop reason: HOSPADM

## 2021-03-05 RX ORDER — LANOLIN ALCOHOL/MO/W.PET/CERES
1 CREAM (GRAM) TOPICAL 2 TIMES DAILY WITH MEALS
Status: DISCONTINUED | OUTPATIENT
Start: 2021-03-06 | End: 2021-03-09 | Stop reason: HOSPADM

## 2021-03-05 RX ADMIN — TRAZODONE HYDROCHLORIDE 50 MG: 50 TABLET ORAL at 23:31

## 2021-03-05 RX ADMIN — LACTULOSE 30 ML: 20 SOLUTION ORAL at 23:31

## 2021-03-05 RX ADMIN — SODIUM CHLORIDE 40 MG: 9 INJECTION, SOLUTION INTRAMUSCULAR; INTRAVENOUS; SUBCUTANEOUS at 23:13

## 2021-03-05 RX ADMIN — Medication 10 ML: at 23:13

## 2021-03-05 RX ADMIN — METOPROLOL TARTRATE 2.5 MG: 5 INJECTION INTRAVENOUS at 23:30

## 2021-03-05 NOTE — ED PROVIDER NOTES
EMERGENCY DEPARTMENT HISTORY AND PHYSICAL EXAM      Date: 3/5/2021  Patient Name: Luzma Malone      History of Presenting Illness     Chief Complaint   Patient presents with    Abdominal Pain       History Provided By: Patient    HPI: Luzma Malone, 77 y.o. male with a past medical history significant myocardial infarction presents to the ED with cc of abdominal pain for past several days and also rectal bleed since last night. According to the patient's, he was seen by us a week ago for abdominal pain and constipation's. After he received Fleet enema and was relieved from his constipation, he was discharged home. He has said he has not have a bowel movement over the past several days again. Last night he noticed he has rectal bleeding. He denies rectal bleed in the past.  He denies hemorrhoids. Last colonoscopy was several years ago for which was negative. Patient does take aspirin and Plavix on daily basis for his cardiac his stent. No other complaints. There are no other complaints, changes, or physical findings at this time. PCP: Yomaira Busby MD    Current Facility-Administered Medications   Medication Dose Route Frequency Provider Last Rate Last Admin    pantoprazole (PROTONIX) 40 mg in 0.9% sodium chloride 10 mL injection  40 mg IntraVENous ONCE Sonia STEWART, DO         Current Outpatient Medications   Medication Sig Dispense Refill    famotidine (PEPCID) 20 mg tablet Take 1 Tab by mouth nightly. 30 Tab 1    traZODone (DESYREL) 50 mg tablet Take 50 mg by mouth nightly as needed for Insomnia.  clopidogreL (PLAVIX) 75 mg tab Take 75 mg by mouth daily.  nitroglycerin (NITROLINGUAL) 400 mcg/spray spray 1 Spray by SubLINGual route every five (5) minutes as needed for Chest Pain.  furosemide (LASIX) 40 mg tablet Take  by mouth daily.  naproxen (NAPROSYN) 500 mg tablet Take 500 mg by mouth two (2) times daily (with meals).       olmesartan (BENICAR) 20 mg tablet Take 20 mg by mouth daily.  atorvastatin (LIPITOR) 40 mg tablet Take  by mouth daily.  metoprolol tartrate (LOPRESSOR) 100 mg IR tablet Take  by mouth two (2) times a day.  isosorbide mononitrate ER (IMDUR) 60 mg CR tablet Take  by mouth every morning. Past History     Past Medical History:  Past Medical History:   Diagnosis Date    Arthritis     Bowel obstruction ([address]     High cholesterol     Hx of abdominal surgery     for SBO    Hypertension        Past Surgical History:  No past surgical history on file. Family History:  Family History   Problem Relation Age of Onset    No Known Problems Mother     No Known Problems Father     Hypertension Other        Social History:  Social History     Tobacco Use    Smoking status: Former Smoker     Packs/day: 0.50     Years: 0.50     Pack years: 0.25     Quit date: 2020     Years since quittin.2    Smokeless tobacco: Never Used   Substance Use Topics    Alcohol use: Never     Frequency: Never    Drug use: Never       Allergies:  No Known Allergies      Review of Systems     Review of Systems   Constitutional: Negative. HENT: Negative. Eyes: Negative. Respiratory: Negative. Cardiovascular: Negative. Gastrointestinal: Positive for abdominal pain, anal bleeding and blood in stool. Endocrine: Negative. Genitourinary: Negative. Musculoskeletal: Negative. Allergic/Immunologic: Negative. Neurological: Negative. Hematological: Negative. Psychiatric/Behavioral: Negative. All other systems reviewed and are negative. Physical Exam     Physical Exam  Vitals signs and nursing note reviewed. Constitutional:       General: He is in acute distress. Appearance: He is well-developed. He is obese. He is not ill-appearing, toxic-appearing or diaphoretic. HENT:      Head: Normocephalic and atraumatic. Eyes:      General: No scleral icterus.      Extraocular Movements: Extraocular movements intact. Pupils: Pupils are equal, round, and reactive to light. Cardiovascular:      Rate and Rhythm: Regular rhythm. Tachycardia present. Heart sounds: Normal heart sounds. Pulmonary:      Effort: Pulmonary effort is normal. No respiratory distress. Breath sounds: Normal breath sounds. No stridor. No wheezing, rhonchi or rales. Chest:      Chest wall: No tenderness. Abdominal:      General: Bowel sounds are normal. There is distension. There is no abdominal bruit. There are no signs of injury. Palpations: There is no shifting dullness, hepatomegaly or splenomegaly. Tenderness: There is generalized abdominal tenderness. Comments: Stable obese abdomen. Positive diffuse tenderness. No guarding at this time. Genitourinary:     Rectum: Guaiac result positive. Tenderness present. Skin:     General: Skin is warm. Coloration: Skin is not cyanotic, jaundiced, mottled or pale. Findings: No erythema or rash. Neurological:      General: No focal deficit present. Mental Status: He is alert and oriented to person, place, and time. Cranial Nerves: No cranial nerve deficit. Motor: No weakness. Psychiatric:         Mood and Affect: Mood normal. Mood is not anxious or depressed.          Behavior: Behavior normal.         Lab and Diagnostic Study Results     Labs -     Recent Results (from the past 12 hour(s))   CBC WITH AUTOMATED DIFF    Collection Time: 03/05/21  1:24 PM   Result Value Ref Range    WBC 8.2 4.4 - 11.3 K/uL    RBC 3.33 (L) 4.50 - 5.90 M/uL    HGB 9.3 (L) 13.5 - 17.5 g/dL    HCT 28.1 (L) 41 - 53 %    MCV 84.6 80 - 100 FL    MCH 27.9 (L) 31 - 34 PG    MCHC 33.0 31.0 - 36.0 g/dL    RDW 14.9 (H) 11.5 - 14.5 %    PLATELET 615 K/uL    MPV 8.8 6.5 - 11.5 FL    NRBC 0.0  WBC    ABSOLUTE NRBC 0.00 K/uL    NEUTROPHILS 57 42 - 75 %    LYMPHOCYTES 27 20.5 - 51.1 %    MONOCYTES 10 (H) 1.7 - 9.3 %    EOSINOPHILS 5 (H) 0.9 - 2.9 %    BASOPHILS 1 0.0 - 2.5 %    ABS. NEUTROPHILS 4.7 1.8 - 7.7 K/UL    ABS. LYMPHOCYTES 2.2 1.0 - 4.8 K/UL    ABS. MONOCYTES 0.8 0.2 - 2.4 K/UL    ABS. EOSINOPHILS 0.4 0.0 - 0.7 K/UL    ABS. BASOPHILS 0.1 0.0 - 0.2 K/UL   METABOLIC PANEL, COMPREHENSIVE    Collection Time: 03/05/21  1:24 PM   Result Value Ref Range    Sodium 140 136 - 145 mmol/L    Potassium 3.7 3.5 - 5.1 mmol/L    Chloride 105 97 - 108 mmol/L    CO2 26 21 - 32 mmol/L    Anion gap 9 5 - 15 mmol/L    Glucose 136 (H) 65 - 100 mg/dL    BUN 27 (H) 6 - 20 mg/dL    Creatinine 1.39 (H) 0.70 - 1.30 mg/dL    BUN/Creatinine ratio 19 12 - 20      GFR est AA >60 >60 ml/min/1.73m2    GFR est non-AA 50 (L) >60 ml/min/1.73m2    Calcium 9.0 8.5 - 10.1 mg/dL    Bilirubin, total 0.4 0.2 - 1.0 mg/dL    AST (SGOT) 28 15 - 37 U/L    ALT (SGPT) 37 12 - 78 U/L    Alk. phosphatase 60 45 - 117 U/L    Protein, total 6.8 6.4 - 8.2 g/dL    Albumin 3.2 (L) 3.5 - 5.0 g/dL    Globulin 3.6 2.0 - 4.0 g/dL    A-G Ratio 0.9 (L) 1.1 - 2.2     PROTHROMBIN TIME + INR    Collection Time: 03/05/21  1:24 PM   Result Value Ref Range    Prothrombin time 10.4 9.0 - 11.1 sec    INR 1.1 0.9 - 1.1     TYPE & SCREEN    Collection Time: 03/05/21  1:24 PM   Result Value Ref Range    Crossmatch Expiration 03/08/2021,2359     ABO/Rh(D) Mitchael Asa Positive     Antibody screen Negative        Radiologic Studies -   [unfilled]  CT Results  (Last 48 hours)               03/05/21 1427  CT ABD PELV WO CONT Final result    Impression:  1. The small bowel is normal in caliber without a mechanical small bowel   obstruction. 2.  The patient did not receive intravenous contrast administration for this   examination. However, he did receive intravenous contrast on 2/27/2021. There is   persistent excretion of contrast into the renal collecting systems.  I would   recommend correlation with the patient's creatinine levels to determine the   significance of the persistent delayed excretion of contrast into the renal   collecting systems to help in exclusion against acute tubular necrosis. 3.  There is diverticulosis of the left side of the colon without active   diverticulitis. 4.  The patient had moderate to marked degenerative disc disease with vacuum   disc signs from his L3-L4 through the L5-S1 levels. Narrative: The study is a CT evaluation of the abdomen and pelvis dated 3/5/2021. History: Abdominal distention. Prior history of a small bowel obstruction. Technique: 5 mm axial imaging was acquired through the abdomen and pelvis   without intravenous contrast administration. Sagittal and coronal reconstructed   imaging is also submitted for interpretation. Dose Reduction Technique was employed to reduce radiation exposure - This   includes reduction optimization techniques as appropriate to a performed exam   with automated exposure control adjustments of the mA and/or Kv according to   patient size, or use of iterative reconstruction technique. LIMITATIONS: The absence of oral contrast opacification of the bowel. COMPARISON: CT evaluation of the abdomen and pelvis dated 2/27/2021. LIVER AND SPLEEN: There is a calcified granuloma within the posterior lateral   aspect of the right lobe. This examination is negative for masses of the liver   or of the spleen. KIDNEYS: There is increased density within the renal collecting systems   apparently representing persistent excretion of contrast into the renal   collecting systems secondary to the patient's prior CT evaluation. There also   are left-sided intrarenal vascular calcifications. This examination is negative   for hydronephrosis or renal masses. PANCREAS AND ADRENAL GLANDS: Normal.       GALLBLADDER AND BILIARY TREE: Normal.       LOWER CHEST: There are coronary artery calcifications. The patient has   atelectatic changes within his lung bases more pronounced on the right compared   to left.  The cardiac chambers are normal in size. There are bridging osteophytes   along the lower thoracic upper lumbar spine. VASCULARITY: There is diffuse calcifications along the wall of the abdominal   aorta and involving its branch vessels. There also are calcifications of the   common iliac arteries. BOWEL: There is a normal caliber to the stomach, the duodenum, the jejunum, and   the ileum. This examination is negative for an obstructive bowel gas pattern. The appendix is not definitively identified. This examination is negative for   specific findings to suggest appendicitis. There is diverticulosis of the left   side of the colon without active diverticulitis. OTHER: This examination is negative for intra-abdominal fluid or   lymphadenopathy. CT EVALUATION OF THE PELVIS: The prostate gland and seminal vesicles are normal   in size. The bladder is imaging in a normal fashion. There is diverticulosis of   the sigmoid colon. The rectum is unremarkable. OSSEOUS STRUCTURES:There is moderate to marked disc space narrowing from the   L3-L4 through the through the L5-S1 levels consistent with degenerative disc   disease. CXR Results  (Last 48 hours)               03/05/21 1718  XR CHEST PORT Final result    Narrative:  Chest single view. Comparison single view chest February 25, 2021       Unchanged appearance for the lungs; no gross interstitial or alveolar pulmonary   edema. Sternal wires in the midline related to prior intrathoracic surgery. Cardiac and mediastinal structures unchanged. No pneumothorax or sizable pleural   effusion. Medical Decision Making and ED Course   - I am the first and primary provider for this patient AND AM THE PRIMARY PROVIDER OF RECORD. - I reviewed the vital signs, available nursing notes, past medical history, past surgical history, family history and social history. - Initial assessment performed.  The patients presenting problems have been discussed, and the staff are in agreement with the care plan formulated and outlined with them. I have encouraged them to ask questions as they arise throughout their visit. Vital Signs-Reviewed the patient's vital signs. Patient Vitals for the past 12 hrs:   Temp Pulse Resp BP SpO2   03/05/21 1616 98 °F (36.7 °C) (!) 101 20 125/62 100 %       EKG interpretation: (Preliminary):    Records Reviewed: Nursing Notes        ED Course:              Provider Notes (Medical Decision Making):     MDM  Number of Diagnoses or Management Options  Gastrointestinal hemorrhage, unspecified gastrointestinal hemorrhage type  Diagnosis management comments: Patient diagnoses include abdominal pain, constipation, ruptured viscus, UTI, kidney stones, gallbladder disease, gastritis, peptic ulcer disease, constipation, obstipation, small bowel obstruction, cardiac event, rectal bleed, GI bleed, internal hemorrhoids, colonic polyps. Amount and/or Complexity of Data Reviewed  Clinical lab tests: reviewed and ordered  Tests in the radiology section of CPT®: ordered and reviewed  Tests in the medicine section of CPT®: ordered and reviewed  Discuss the patient with other providers: yes (Case discussed with Dr. Morteza Knapp. [facility] to [facility], for Dr. Morteza Knapp was advised)    Risk of Complications, Morbidity, and/or Mortality  Presenting problems: high  Diagnostic procedures: high  Management options: high  General comments: Patient remained stable throughout the course of treatment. IV fluid and Protonix were given. Rectal exam was positive for gross blood. Hemoccult positive. Patient hemoglobin is 9.3 today his recent hemoglobin that was about 3 to 4 days ago was 11. Case discussed with admitting physician. Will admit patient to his service. Case also discussed with patient. He understood and agree with her management. Consultations:       Consultations: -  Hospitalist Consultant: Dr. Estrada Comfort:  We have asked for emergent assistance with regard to this patient. We have discussed the patients HPI, ROS, PE and results this far. They will come and evaluate the patient for admission. Procedures and Critical Care               CRITICAL CARE NOTE :  4:39 PM  Amount of Critical Care Time: (minutes)    IMPENDING DETERIORATION -  ASSOCIATED RISK FACTORS -   MANAGEMENT-   INTERPRETATION -    INTERVENTIONS -   CASE REVIEW -   TREATMENT RESPONSE -  PERFORMED BY -     NOTES   :  I have spent critical care time involved in lab review, consultations with specialist, family decision- making, bedside attention and documentation. This time excludes time spent in any separate billed procedures. During this entire length of time I was immediately available to the patient . Carl Soni DO        Disposition     Disposition: Condition stable    Admitted    Diagnosis     Clinical Impression:   1. Gastrointestinal hemorrhage, unspecified gastrointestinal hemorrhage type        Attestations:    Carl Soni DO    Please note that this dictation was completed with [employer], the computer voice recognition software. Quite often unanticipated grammatical, syntax, homophones, and other interpretive errors are inadvertently transcribed by the computer software. Please disregard these errors. Please excuse any errors that have escaped final proofreading. Thank you.

## 2021-03-05 NOTE — PROGRESS NOTES
Reason for Admission:                       RUR Score:                  PCP: First and Last name:   Sami Moulton MD     Name of Practice:    Are you a current patient: Yes/No: yes   Approximate date of last visit: 6/6/20   Can you participate in a virtual visit if needed: yes    Do you (patient/family) have any concerns for transition/discharge? no                Plan for utilizing home health:   no    Current Advanced Directive/Advance Care Plan:  Full Code      Healthcare Decision Maker:   Click here to complete [employer] including selection of the Healthcare Decision Maker Relationship (ie \"Primary\")            Primary Decision Maker: Channing Romano, ANAY - Other Relative - [phone]    Transition of Care Plan:    Home  The patient lives alone and is independent in his ADL and IADL. Will follow medical improvement.
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
## 2021-03-05 NOTE — ACP (ADVANCE CARE PLANNING)
Advance Care Planning     Advance Care Planning (ACP) Physician/NP/PA Conversation      Date of Conversation: 3/5/2021  Conducted with: Patient with Decision Making Capacity    Healthcare Decision Maker:     Primary Decision Maker: Cuco Valerio - Other Relative - [phone]  Click here to complete [id number] including selection of the Healthcare Decision Maker Relationship (ie \"Primary\")      Care Preferences:    Hospitalization: \"If your health worsens and it becomes clear that your chance of recovery is unlikely, what would be your preference regarding hospitalization? \"  The patient would prefer hospitalization. Ventilation: \"If you were unable to breathe on your own and your chance of recovery was unlikely, what would be your preference about the use of a ventilator (breathing machine) if it was available to you? \"   The patient would desire the use of a ventilator. Resuscitation: \"In the event your heart stopped as a result of an underlying serious health condition, would you want attempts to be made to restart your heart, or would you prefer a natural death? \"   Yes, attempt to resuscitate.     Additional topics discussed: treatment goals    Conversation Outcomes / Follow-Up Plan:   ACP complete - no further action today  Reviewed DNR/DNI and patient elects Full Code (Attempt Resuscitation)     Length of Voluntary ACP Conversation in minutes:  <16 minutes (Non-Billable)    Annette Meléndez NP

## 2021-03-05 NOTE — ED NOTES
Pt recently d/c from [facility] for GI bleeding. Pt reporting constipation and \"dark red in my stool. \"     Pt denies pain, denies N/V.

## 2021-03-05 NOTE — H&P
History and Physical    Patient: Ani Cha MRN: [id number]  SSN: [id number]    YOB: 1945  Age: 77 y.o. Sex: male      Subjective:      Ani Cha is a 77 y.o. male who Comes into the emergency room for 1 week abdominal pain, continued constipation at home then rectal bleeding overnight. PMH significant for MI, CAD, HTN, HLD and previous small bowel obstructions. Was seen last week in the emergency room for constipation/ileus requiring fleets enema, NG tube and discharged home. States she has not had a bowel movement since then. Patient takes Plavix and aspirin status post cardiac stent. Never get labs on admission hemoglobin 9.3 down from 11.6, BUN 27, creatinine 1.39 both elevated from previous numbers. CT of abdomen reveals diverticulosis without active diverticulitis, small bowel without obstruction. Will admit for further management of lower GI bleed. Consult GI.  IV fluids. We are liquid diet. IV Protonix. .     Past Medical History:   Diagnosis Date    Arthritis     Bowel obstruction ([facility] [address]     High cholesterol     Hx of abdominal surgery     for SBO    Hypertension      No past surgical history on file. Family History   Problem Relation Age of Onset    Hypertension Mother     No Known Problems Father     Hypertension Other      Social History     Tobacco Use    Smoking status: Former Smoker     Packs/day: 0.50     Years: 0.50     Pack years: 0.25     Quit date: 2020     Years since quittin.2    Smokeless tobacco: Never Used   Substance Use Topics    Alcohol use: Never     Frequency: Never      Prior to Admission medications    Medication Sig Start Date End Date Taking? Authorizing Provider   famotidine (PEPCID) 20 mg tablet Take 1 Tab by mouth nightly. 3/1/21   Oliver Lorenzo PA-C   traZODone (DESYREL) 50 mg tablet Take 50 mg by mouth nightly as needed for Insomnia.  21   Provider, Historical   clopidogreL (PLAVIX) 75 mg tab Take 75 mg by mouth daily. 2/6/21   Provider, Historical   nitroglycerin (NITROLINGUAL) 400 mcg/spray spray 1 Spray by SubLINGual route every five (5) minutes as needed for Chest Pain. Provider, Historical   furosemide (LASIX) 40 mg tablet Take  by mouth daily. Provider, Historical   naproxen (NAPROSYN) 500 mg tablet Take 500 mg by mouth two (2) times daily (with meals). Provider, Historical   olmesartan (BENICAR) 20 mg tablet Take 20 mg by mouth daily. Provider, Historical   atorvastatin (LIPITOR) 40 mg tablet Take  by mouth daily. Provider, Historical   metoprolol tartrate (LOPRESSOR) 100 mg IR tablet Take  by mouth two (2) times a day. Provider, Historical   isosorbide mononitrate ER (IMDUR) 60 mg CR tablet Take  by mouth every morning. Provider, Historical        No Known Allergies    Review of Systems:  Constitutional: No fevers, No chills, No fatigue, +++weakness  Eyes: No visual disturbance  Ears, Nose, Mouth, Throat, and Face: No nasal congestion, No sore throat  Respiratory: No cough, No sputum, No wheezing, No SOB  Cardiovascular: No chest pain, No lower extremity edema, No Palpitations   Gastrointestinal: No nausea, No vomiting, No diarrhea, +++ constipation, +++abdominal pain  Genitourinary: No frequency, No dysuria, No hematuria  Integument/Breast: No rash, No skin lesion(s), No dryness  Musculoskeletal: No arthralgias, No neck pain, No back pain  Neurological: No headaches, No dizziness, No confusion,  No seizures  Behavioral/Psychiatric: +++ anxiety, No depression      Objective:     Vitals:    03/05/21 1616   BP: 125/62   Pulse: (!) 101   Resp: 20   Temp: 98 °F (36.7 °C)   SpO2: 100%   Weight: 112.5 kg (248 lb)   Height: 5' 9\" (1.753 m)        Physical Exam:  General: alert, cooperative, moderate distress  Eye: conjunctivae/corneas clear. PERRL, EOM's intact. Throat and Neck: normal and no erythema or exudates noted.  No mass   Lung: clear to auscultation bilaterally  Heart: regular rate and rhythm,   Abdomen: Firm and tender. Bowel sounds normal. No masses,  Extremities:  able to move all extremities normal, atraumatic  Skin: Normal.  Neurologic: AOx3. Cranial nerves 2-12 and sensation grossly intact. Psychiatric: non focal    Recent Results (from the past 24 hour(s))   CBC WITH AUTOMATED DIFF    Collection Time: 03/05/21  1:24 PM   Result Value Ref Range    WBC 8.2 4.4 - 11.3 K/uL    RBC 3.33 (L) 4.50 - 5.90 M/uL    HGB 9.3 (L) 13.5 - 17.5 g/dL    HCT 28.1 (L) 41 - 53 %    MCV 84.6 80 - 100 FL    MCH 27.9 (L) 31 - 34 PG    MCHC 33.0 31.0 - 36.0 g/dL    RDW 14.9 (H) 11.5 - 14.5 %    PLATELET 729 K/uL    MPV 8.8 6.5 - 11.5 FL    NRBC 0.0  WBC    ABSOLUTE NRBC 0.00 K/uL    NEUTROPHILS 57 42 - 75 %    LYMPHOCYTES 27 20.5 - 51.1 %    MONOCYTES 10 (H) 1.7 - 9.3 %    EOSINOPHILS 5 (H) 0.9 - 2.9 %    BASOPHILS 1 0.0 - 2.5 %    ABS. NEUTROPHILS 4.7 1.8 - 7.7 K/UL    ABS. LYMPHOCYTES 2.2 1.0 - 4.8 K/UL    ABS. MONOCYTES 0.8 0.2 - 2.4 K/UL    ABS. EOSINOPHILS 0.4 0.0 - 0.7 K/UL    ABS. BASOPHILS 0.1 0.0 - 0.2 K/UL   METABOLIC PANEL, COMPREHENSIVE    Collection Time: 03/05/21  1:24 PM   Result Value Ref Range    Sodium 140 136 - 145 mmol/L    Potassium 3.7 3.5 - 5.1 mmol/L    Chloride 105 97 - 108 mmol/L    CO2 26 21 - 32 mmol/L    Anion gap 9 5 - 15 mmol/L    Glucose 136 (H) 65 - 100 mg/dL    BUN 27 (H) 6 - 20 mg/dL    Creatinine 1.39 (H) 0.70 - 1.30 mg/dL    BUN/Creatinine ratio 19 12 - 20      GFR est AA >60 >60 ml/min/1.73m2    GFR est non-AA 50 (L) >60 ml/min/1.73m2    Calcium 9.0 8.5 - 10.1 mg/dL    Bilirubin, total 0.4 0.2 - 1.0 mg/dL    AST (SGOT) 28 15 - 37 U/L    ALT (SGPT) 37 12 - 78 U/L    Alk.  phosphatase 60 45 - 117 U/L    Protein, total 6.8 6.4 - 8.2 g/dL    Albumin 3.2 (L) 3.5 - 5.0 g/dL    Globulin 3.6 2.0 - 4.0 g/dL    A-G Ratio 0.9 (L) 1.1 - 2.2     PROTHROMBIN TIME + INR    Collection Time: 03/05/21  1:24 PM   Result Value Ref Range    Prothrombin time 10.4 9.0 - 11.1 sec    INR 1.1 0.9 - 1.1     TYPE & SCREEN    Collection Time: 03/05/21  1:24 PM   Result Value Ref Range    Crossmatch Expiration 03/08/2021,2359     ABO/Rh(D) Carlos Eduardo Mk Positive     Antibody screen Negative        XR Results (maximum last 3): Results from [place] encounter on 03/05/21   XR CHEST PORT    Narrative Chest single view. Comparison single view chest February 25, 2021    Unchanged appearance for the lungs; no gross interstitial or alveolar pulmonary  edema. Sternal wires in the midline related to prior intrathoracic surgery. Cardiac and mediastinal structures unchanged. No pneumothorax or sizable pleural  effusion. Results from [place] encounter on 02/24/21   XR CHEST SNGL V    Narrative AP upright view of the chest compared to prior exam dated 02/25/2021. Sternotomy  changes and enteric tube are again noted. NG tube tip overlies the mid stomach. Cardiac silhouette is unremarkable and the lungs are unchanged. Impression No significant change. XR CHEST SNGL V    Narrative AP views of the chest compared to prior exam dated 02/24/2021. Bilateral  airspace disease is noted more prominently on the right. Prominence of the right  pulmonary hilum seen previously is again noted and unchanged. No pneumothorax or  pleural effusion is seen. Enteric tube and sternotomy sutures are again noted. Cardiac silhouette is stable in appearance. Impression Asymmetric bilateral airspace disease greater on the right  suspicious for atypical pneumonia. Prominence of the right pulmonary hilum of  uncertain significance. CTA of the chest may be helpful for further evaluation. CT Results (maximum last 3): Results from [place] encounter on 03/05/21   CT ABD PELV WO CONT    Narrative The study is a CT evaluation of the abdomen and pelvis dated 3/5/2021. History: Abdominal distention. Prior history of a small bowel obstruction.     Technique: 5 mm axial imaging was acquired through the abdomen and pelvis  without intravenous contrast administration. Sagittal and coronal reconstructed  imaging is also submitted for interpretation. Dose Reduction Technique was employed to reduce radiation exposure - This  includes reduction optimization techniques as appropriate to a performed exam  with automated exposure control adjustments of the mA and/or Kv according to  patient size, or use of iterative reconstruction technique. LIMITATIONS: The absence of oral contrast opacification of the bowel. COMPARISON: CT evaluation of the abdomen and pelvis dated 2/27/2021. LIVER AND SPLEEN: There is a calcified granuloma within the posterior lateral  aspect of the right lobe. This examination is negative for masses of the liver  or of the spleen. KIDNEYS: There is increased density within the renal collecting systems  apparently representing persistent excretion of contrast into the renal  collecting systems secondary to the patient's prior CT evaluation. There also  are left-sided intrarenal vascular calcifications. This examination is negative  for hydronephrosis or renal masses. PANCREAS AND ADRENAL GLANDS: Normal.    GALLBLADDER AND BILIARY TREE: Normal.    LOWER CHEST: There are coronary artery calcifications. The patient has  atelectatic changes within his lung bases more pronounced on the right compared  to left. The cardiac chambers are normal in size. There are bridging osteophytes  along the lower thoracic upper lumbar spine. VASCULARITY: There is diffuse calcifications along the wall of the abdominal  aorta and involving its branch vessels. There also are calcifications of the  common iliac arteries. BOWEL: There is a normal caliber to the stomach, the duodenum, the jejunum, and  the ileum. This examination is negative for an obstructive bowel gas pattern. The appendix is not definitively identified. This examination is negative for  specific findings to suggest appendicitis. There is diverticulosis of the left  side of the colon without active diverticulitis. OTHER: This examination is negative for intra-abdominal fluid or  lymphadenopathy. CT EVALUATION OF THE PELVIS: The prostate gland and seminal vesicles are normal  in size. The bladder is imaging in a normal fashion. There is diverticulosis of  the sigmoid colon. The rectum is unremarkable. OSSEOUS STRUCTURES:There is moderate to marked disc space narrowing from the  L3-L4 through the through the L5-S1 levels consistent with degenerative disc  disease. Impression 1. The small bowel is normal in caliber without a mechanical small bowel  obstruction. 2.  The patient did not receive intravenous contrast administration for this  examination. However, he did receive intravenous contrast on 2/27/2021. There is  persistent excretion of contrast into the renal collecting systems. I would  recommend correlation with the patient's creatinine levels to determine the  significance of the persistent delayed excretion of contrast into the renal  collecting systems to help in exclusion against acute tubular necrosis. 3.  There is diverticulosis of the left side of the colon without active  diverticulitis. 4.  The patient had moderate to marked degenerative disc disease with vacuum  disc signs from his L3-L4 through the L5-S1 levels. Results from [place] encounter on 02/24/21   CT ABD PELV W CONT    Narrative Exam: CT abdomen and pelvis with intravenous contrast    Comparison: 2/24/2021. 1/31/2015. Dose reduction: All CT scans at this facility are performed using dose reduction  optimization techniques as appropriate to perform the exam including the  following: automated exposure control, adjustments of the mA and/or kV according  to patient size, or use of iterative reconstruction technique. Technique:  Following oral contrast ingestion and during  intravenous contrast  administration (100 cc of [id number]-[id number]), contiguous axial sections were obtained  through the abdomen and pelvis. Coronal and sagittal reconstructions were  generated. Findings: Scattered basilar atelectasis. Coronary artery calcific  atherosclerotic disease. Scattered pancolonic diverticulosis, without evident  diverticulitis. Bowel otherwise appears unremarkable. In particular, negative  for bowel dilatation, bowel wall thickening, pneumatosis intestinalis,  mesenteroportal venous gas, or free subdiaphragmatic air. The appendix is not  visualized, however, there is no evidence of inflammation in the vicinity of the  terminal ileum or cecum. Though calcific atherosclerotic disease affects the  vessel origins, mesenteric vascular inflow present. No free or loculated fluid. No mass or adenopathy. Mildly prominent gallbladder, without CT demonstrated  gallbladder wall thickening or pericholecystic fluid. No biliary or pancreatic  ductal dilatation. Liver and pancreas unremarkable. Adrenals unremarkable. Spleen unremarkable. Kidneys and renal collecting systems unremarkable. No  abdominal wall or pelvic floor herniation. Lumbosacral degenerative disease. Visualized bone scaffolding otherwise unremarkable. Impression 1. No bowel obstruction. 2. Atherosclerotic disease of mesenteric inflow, without demonstrated untoward  downstream sequela at this time. 3. Coronary artery calcific atherosclerotic disease. Results from [place] encounter on 02/24/21   CT ABD PELV WO CONT    Narrative CT abdomen and pelvis, 2/24/2021    History: Bowel obstruction. Technique: No oral or intravenous contrast was administered. Multiple  contiguous axial images were obtained from the diaphragm to the inferior pubic  rami. Coronal and sagittal reconstruction of images was made.     All CT scans at this facility are performed using dose reduction optimization  techniques as appropriate to perform the exam including the following: Automated  exposure control, adjustments of the mA and/or kV according to patient size, or  use iterative reconstruction technique. Comparison: Including CT abdomen pelvis 8/23/2013. Findings: The included lung bases show atelectasis. Mild bronchiectasis is  present. Peribronchial thickening is noted in the right lower lobe. Coronary  artery calcifications are present. The liver has a subcentimeter calcification. The gallbladder, spleen, adrenal  glands, pancreas demonstrate no focal abnormality in this noncontrast enhanced  study. The kidneys show no calculi or hydronephrosis. No ventral wall hernia is identified. The stomach has normal contours. There are predominantly mid to distal small bowel loops with air-fluid levels  measuring up to almost 3 cm in diameter. A discrete transition point is not  identified at this time. No significant bowel wall thickening or adjacent  inflammatory changes are present. Differential considerations include ileus,  enteritis and low-grade bowel obstruction. The terminal ileum is within normal limits. The appendix images normally. Air  and stool are present throughout normal caliber large bowel and rectum. There  is colonic diverticulosis without acute diverticulitis. The bladder is small in volume. The prostate gland measures 4.7 cm x 3.9 cm. The seminal vesicles are within normal limits. Fat-containing inguinal hernias  are seen. The abdominal aorta is normal in caliber. Atherosclerotic plaque is seen in the  abdominal aorta, mesenteric and iliac vessels. No free air or free fluid is noted. Degenerative changes are seen in the spine, most pronounced at L4-L5 and L5-S1. Impression 1. Prominent small bowel loops with air-fluid levels. Differential  considerations include ileus, enteritis and low-grade bowel obstruction. Air  and stool throughout normal caliber large bowel.   2.  Colonic diverticulosis without acute diverticulitis. Other findings as above. MRI Results (maximum last 3): No results found for this or any previous visit. Nuclear Medicine Results (maximum last 3): No results found for this or any previous visit. US Results (maximum last 3): No results found for this or any previous visit. Active Problems:    Rectal bleeding (3/5/2021)      Abdominal pain (3/5/2021)      Chronic constipation (3/5/2021)      Constipation (3/5/2021)        Assessment/Plan:       1. Abdominal pain and distention:  2. Recent SBO:  3. Chronic constipation:  4. Rectal bleeding:  · CT scan of abdomen did not show gas formation or obstruction, positive diverticulosis without diverticulitis  · We will hold Plavix and aspirin  · IV fluids, IV Protonix, clear liquid diet, IV morphine for pain  · Consult GI  · Consult general surgery  · Monitor hemoglobin    5. CAD, MI, stent placements:  · Continue metoprolol changed to IV  · Holding aspirin and Plavix for lower GI bleeding    6.   PAPITO on CKD:  · BUN and creatinine elevated since last admission  · IV fluids  · Monitor labs      DVT Prophylaxis: SCDs  Code Status: Full  POA/NOK:  Total Time spent in direct and indirect care including assessment review of labs and coordination of services and consultations: Greater than 75 minutes      Signed By: Magaly Pascal NP     March 5, 2021

## 2021-03-06 LAB
ALBUMIN SERPL-MCNC: 3.1 G/DL (ref 3.5–5)
ALBUMIN/GLOB SERPL: 0.9 {RATIO} (ref 1.1–2.2)
ALP SERPL-CCNC: 61 U/L (ref 45–117)
ALT SERPL-CCNC: 34 U/L (ref 12–78)
ANION GAP SERPL CALC-SCNC: 3 MMOL/L (ref 5–15)
APPEARANCE UR: CLEAR
AST SERPL W P-5'-P-CCNC: 23 U/L (ref 15–37)
ATRIAL RATE: 105 BPM
BACTERIA URNS QL MICRO: NEGATIVE /HPF
BILIRUB SERPL-MCNC: 0.5 MG/DL (ref 0.2–1)
BILIRUB UR QL: NEGATIVE
BUN SERPL-MCNC: 21 MG/DL (ref 6–20)
BUN/CREAT SERPL: 19 (ref 12–20)
CA-I BLD-MCNC: 8.7 MG/DL (ref 8.5–10.1)
CALCULATED P AXIS, ECG09: 39 DEGREES
CALCULATED R AXIS, ECG10: -12 DEGREES
CALCULATED T AXIS, ECG11: 57 DEGREES
CHLORIDE SERPL-SCNC: 108 MMOL/L (ref 97–108)
CO2 SERPL-SCNC: 28 MMOL/L (ref 21–32)
COLOR UR: ABNORMAL
CREAT SERPL-MCNC: 1.1 MG/DL (ref 0.7–1.3)
CRP SERPL-MCNC: 0.82 MG/DL (ref 0–0.6)
DIAGNOSIS, 93000: NORMAL
ERYTHROCYTE [DISTWIDTH] IN BLOOD BY AUTOMATED COUNT: 14.6 % (ref 11.5–14.5)
GLOBULIN SER CALC-MCNC: 3.5 G/DL (ref 2–4)
GLUCOSE SERPL-MCNC: 115 MG/DL (ref 65–100)
GLUCOSE UR STRIP.AUTO-MCNC: NEGATIVE MG/DL
HCT VFR BLD AUTO: 26.1 % (ref 36.6–50.3)
HGB BLD-MCNC: 8 G/DL (ref 12.1–17)
HGB UR QL STRIP: NEGATIVE
KETONES UR QL STRIP.AUTO: NEGATIVE MG/DL
LEUKOCYTE ESTERASE UR QL STRIP.AUTO: NEGATIVE
MCH RBC QN AUTO: 27.2 PG (ref 26–34)
MCHC RBC AUTO-ENTMCNC: 30.7 G/DL (ref 30–36.5)
MCV RBC AUTO: 88.8 FL (ref 80–99)
NITRITE UR QL STRIP.AUTO: NEGATIVE
P-R INTERVAL, ECG05: 160 MS
PH UR STRIP: 5 [PH] (ref 5–8)
PLATELET # BLD AUTO: 271 K/UL (ref 150–400)
PMV BLD AUTO: 10.2 FL (ref 8.9–12.9)
POTASSIUM SERPL-SCNC: 3.7 MMOL/L (ref 3.5–5.1)
PROCALCITONIN SERPL-MCNC: <0.05 NG/ML
PROT SERPL-MCNC: 6.6 G/DL (ref 6.4–8.2)
PROT UR STRIP-MCNC: NEGATIVE MG/DL
Q-T INTERVAL, ECG07: 344 MS
QRS DURATION, ECG06: 104 MS
QTC CALCULATION (BEZET), ECG08: 454 MS
RBC # BLD AUTO: 2.94 M/UL (ref 4.1–5.7)
RBC #/AREA URNS HPF: ABNORMAL /HPF (ref 0–5)
SODIUM SERPL-SCNC: 139 MMOL/L (ref 136–145)
SP GR UR REFRACTOMETRY: >1.03 (ref 1–1.03)
UROBILINOGEN UR QL STRIP.AUTO: 0.1 EU/DL (ref 0.1–1)
VENTRICULAR RATE, ECG03: 105 BPM
WBC # BLD AUTO: 7.8 K/UL (ref 4.1–11.1)
WBC URNS QL MICRO: ABNORMAL /HPF (ref 0–4)

## 2021-03-06 PROCEDURE — 36415 COLL VENOUS BLD VENIPUNCTURE: CPT

## 2021-03-06 PROCEDURE — C9113 INJ PANTOPRAZOLE SODIUM, VIA: HCPCS | Performed by: NURSE PRACTITIONER

## 2021-03-06 PROCEDURE — 74011000250 HC RX REV CODE- 250: Performed by: NURSE PRACTITIONER

## 2021-03-06 PROCEDURE — 74011250637 HC RX REV CODE- 250/637: Performed by: NURSE PRACTITIONER

## 2021-03-06 PROCEDURE — 74011250637 HC RX REV CODE- 250/637: Performed by: INTERNAL MEDICINE

## 2021-03-06 PROCEDURE — 65270000029 HC RM PRIVATE

## 2021-03-06 PROCEDURE — 93010 ELECTROCARDIOGRAM REPORT: CPT | Performed by: INTERNAL MEDICINE

## 2021-03-06 PROCEDURE — 80053 COMPREHEN METABOLIC PANEL: CPT

## 2021-03-06 PROCEDURE — 74011250636 HC RX REV CODE- 250/636: Performed by: NURSE PRACTITIONER

## 2021-03-06 PROCEDURE — 86140 C-REACTIVE PROTEIN: CPT

## 2021-03-06 PROCEDURE — 85027 COMPLETE CBC AUTOMATED: CPT

## 2021-03-06 PROCEDURE — 93005 ELECTROCARDIOGRAM TRACING: CPT

## 2021-03-06 PROCEDURE — 84145 PROCALCITONIN (PCT): CPT

## 2021-03-06 PROCEDURE — 81003 URINALYSIS AUTO W/O SCOPE: CPT

## 2021-03-06 RX ADMIN — FERROUS SULFATE TAB 325 MG (65 MG ELEMENTAL FE) 325 MG: 325 (65 FE) TAB at 09:25

## 2021-03-06 RX ADMIN — METOPROLOL TARTRATE 2.5 MG: 5 INJECTION INTRAVENOUS at 23:41

## 2021-03-06 RX ADMIN — Medication 10 ML: at 13:02

## 2021-03-06 RX ADMIN — METOPROLOL TARTRATE 2.5 MG: 5 INJECTION INTRAVENOUS at 17:30

## 2021-03-06 RX ADMIN — LACTULOSE 30 ML: 20 SOLUTION ORAL at 09:25

## 2021-03-06 RX ADMIN — METOPROLOL TARTRATE 2.5 MG: 5 INJECTION INTRAVENOUS at 12:25

## 2021-03-06 RX ADMIN — ONDANSETRON 4 MG: 2 INJECTION INTRAMUSCULAR; INTRAVENOUS at 13:27

## 2021-03-06 RX ADMIN — FAMOTIDINE 20 MG: 10 INJECTION, SOLUTION INTRAVENOUS at 11:59

## 2021-03-06 RX ADMIN — ONDANSETRON 4 MG: 2 INJECTION INTRAMUSCULAR; INTRAVENOUS at 23:41

## 2021-03-06 RX ADMIN — SODIUM CHLORIDE 125 ML/HR: 9 INJECTION, SOLUTION INTRAVENOUS at 03:50

## 2021-03-06 RX ADMIN — ISOSORBIDE MONONITRATE 60 MG: 30 TABLET, EXTENDED RELEASE ORAL at 06:04

## 2021-03-06 RX ADMIN — TRAZODONE HYDROCHLORIDE 50 MG: 50 TABLET ORAL at 23:41

## 2021-03-06 RX ADMIN — SODIUM CHLORIDE 125 ML/HR: 9 INJECTION, SOLUTION INTRAVENOUS at 23:42

## 2021-03-06 RX ADMIN — LACTULOSE 30 ML: 20 SOLUTION ORAL at 16:42

## 2021-03-06 RX ADMIN — Medication 10 ML: at 22:00

## 2021-03-06 RX ADMIN — METOPROLOL TARTRATE 2.5 MG: 5 INJECTION INTRAVENOUS at 06:03

## 2021-03-06 RX ADMIN — ATORVASTATIN CALCIUM 40 MG: 40 TABLET, FILM COATED ORAL at 09:25

## 2021-03-06 RX ADMIN — Medication 10 ML: at 06:04

## 2021-03-06 RX ADMIN — FERROUS SULFATE TAB 325 MG (65 MG ELEMENTAL FE) 325 MG: 325 (65 FE) TAB at 16:42

## 2021-03-06 RX ADMIN — SODIUM CHLORIDE 125 ML/HR: 9 INJECTION, SOLUTION INTRAVENOUS at 14:39

## 2021-03-06 RX ADMIN — SODIUM CHLORIDE 40 MG: 9 INJECTION, SOLUTION INTRAMUSCULAR; INTRAVENOUS; SUBCUTANEOUS at 09:00

## 2021-03-06 NOTE — ED NOTES
TRANSFER - OUT REPORT:    Verbal report given on Niyah Corralesl  being transferred to (unit) for routine progression of care       Report consisted of patients Situation, Background, Assessment and   Recommendations(SBAR). Information from the following report(s) SBAR was reviewed with the receiving nurse. Lines:       Opportunity for questions and clarification was provided.       Patient transported with:   [employer]

## 2021-03-06 NOTE — PROGRESS NOTES
Hospitalist Progress Note    Subjective:   Daily Progress Note: 3/6/2021 3:15 PM    Hospital Course:     Jonathan Turner is a 77 y.o. male who Comes into the emergency room for 1 week abdominal pain, continued constipation at home then rectal bleeding overnight. PMH significant for MI, CAD, HTN, HLD and previous small bowel obstructions. Was seen last week in the emergency room for constipation/ileus requiring fleets enema, NG tube and discharged home. States she has not had a bowel movement since then. Patient takes Plavix and aspirin status post cardiac stent. Never get labs on admission hemoglobin 9.3 down from 11.6, BUN 27, creatinine 1.39 both elevated from previous numbers. CT of abdomen reveals diverticulosis without active diverticulitis, small bowel without obstruction. Will admit for further management of lower GI bleed. Consult GI.  IV fluids. Clear liquid diet. IV Protonix. .   Subjective:  Examined patient at bedside. He states he is having chills still feels bad and continues to have bloody stools.     Current Facility-Administered Medications   Medication Dose Route Frequency    atorvastatin (LIPITOR) tablet 40 mg  40 mg Oral DAILY    isosorbide mononitrate ER (IMDUR) tablet 60 mg  60 mg Oral 7am    traZODone (DESYREL) tablet 50 mg  50 mg Oral QHS PRN    sodium chloride (NS) flush 5-40 mL  5-40 mL IntraVENous Q8H    sodium chloride (NS) flush 5-40 mL  5-40 mL IntraVENous PRN    acetaminophen (TYLENOL) tablet 650 mg  650 mg Oral Q6H PRN    Or    acetaminophen (TYLENOL) suppository 650 mg  650 mg Rectal Q6H PRN    polyethylene glycol (MIRALAX) packet 17 g  17 g Oral DAILY PRN    bisacodyL (DULCOLAX) tablet 5 mg  5 mg Oral DAILY PRN    ondansetron (ZOFRAN ODT) tablet 4 mg  4 mg Oral Q6H PRN    Or    ondansetron (ZOFRAN) injection 4 mg  4 mg IntraVENous Q6H PRN    metoprolol (LOPRESSOR) injection 2.5 mg  2.5 mg IntraVENous Q6H    pantoprazole (PROTONIX) 40 mg in 0.9% sodium chloride 10 mL injection  40 mg IntraVENous Q12H    0.9% sodium chloride infusion  125 mL/hr IntraVENous CONTINUOUS    lactulose (CHRONULAC) 10 gram/15 mL solution 30 mL  20 g Oral TID    [START ON 3/7/2021] famotidine (PF) (PEPCID) 20 mg in 0.9% sodium chloride 10 mL injection  20 mg IntraVENous Q12H    morphine injection 2 mg  2 mg IntraVENous Q2H PRN    ferrous sulfate tablet 325 mg  1 Tab Oral BID WITH MEALS        REVIEW OF SYSTEMS    Review of Systems   Constitutional: Positive for chills and malaise/fatigue. Negative for fever. HENT: Negative. Respiratory: Negative for cough and shortness of breath. Cardiovascular: Negative for chest pain. Gastrointestinal: Positive for abdominal pain and blood in stool. Neurological: Positive for weakness. Objective:     Visit Vitals  /70   Pulse 100   Temp 98 °F (36.7 °C)   Resp 18   Ht 5' 9\" (1.753 m)   Wt 112.5 kg (248 lb)   SpO2 98%   BMI 36.62 kg/m²      O2 Device: Room air    Temp (24hrs), Av.1 °F (36.7 °C), Min:98 °F (36.7 °C), Max:98.3 °F (36.8 °C)      No intake/output data recorded.  1901 -  0700  In: -   Out: 300 [Urine:300]    PHYSICAL EXAM:    Physical Exam  Constitutional:       Appearance: He is obese. He is ill-appearing. Cardiovascular:      Rate and Rhythm: Tachycardia present. Pulmonary:      Effort: No respiratory distress. Abdominal:      General: There is distension. Tenderness: There is guarding. Musculoskeletal:      Right lower leg: No edema. Left lower leg: No edema. Neurological:      Mental Status: He is oriented to person, place, and time. Motor: Weakness present.           Data Review    Recent Results (from the past 24 hour(s))   OCCULT BLOOD, STOOL    Collection Time: 21  6:40 PM   Result Value Ref Range    Occult Blood,day 1 Positive (A) Negative     CBC WITH AUTOMATED DIFF    Collection Time: 21  7:00 PM   Result Value Ref Range    WBC 7.9 4.1 - 11.1 K/uL    RBC 3.25 (L) 4.10 - 5.70 M/uL    HGB 8.9 (L) 12.1 - 17.0 g/dL    HCT 28.6 (L) 36.6 - 50.3 %    MCV 88.0 80.0 - 99.0 FL    MCH 27.4 26.0 - 34.0 PG    MCHC 31.1 30.0 - 36.5 g/dL    RDW 14.5 11.5 - 14.5 %    PLATELET 864 322 - 386 K/uL    MPV 10.6 8.9 - 12.9 FL    NEUTROPHILS 59 32 - 75 %    LYMPHOCYTES 27 12 - 49 %    MONOCYTES 8 5 - 13 %    EOSINOPHILS 4 0 - 7 %    BASOPHILS 1 0 - 1 %    IMMATURE GRANULOCYTES 1 (H) 0.0 - 0.5 %    ABS. NEUTROPHILS 4.8 1.8 - 8.0 K/UL    ABS. LYMPHOCYTES 2.1 0.8 - 3.5 K/UL    ABS. MONOCYTES 0.6 0.0 - 1.0 K/UL    ABS. EOSINOPHILS 0.4 0.0 - 0.4 K/UL    ABS. BASOPHILS 0.1 0.0 - 0.1 K/UL    ABS. IMM.  GRANS. 0.1 (H) 0.00 - 0.04 K/UL    DF AUTOMATED     TYPE & SCREEN    Collection Time: 03/05/21  7:00 PM   Result Value Ref Range    Crossmatch Expiration 03/08/2021,2359     ABO/Rh(D) Deidre Mustafa Positive     Antibody screen Negative    TROPONIN I    Collection Time: 03/05/21  7:00 PM   Result Value Ref Range    Troponin-I, Qt. <0.05 <0.05 ng/mL   LIPASE    Collection Time: 03/05/21  7:00 PM   Result Value Ref Range    Lipase 215 73 - 393 U/L   EKG, 12 LEAD, INITIAL    Collection Time: 03/05/21  7:22 PM   Result Value Ref Range    Ventricular Rate 105 BPM    Atrial Rate 105 BPM    P-R Interval 160 ms    QRS Duration 104 ms    Q-T Interval 344 ms    QTC Calculation (Bezet) 454 ms    Calculated P Axis 39 degrees    Calculated R Axis -12 degrees    Calculated T Axis 57 degrees    Diagnosis       Sinus tachycardia with Premature atrial complexes with Abberant conduction  Possible Left atrial enlargement  Septal infarct , age undetermined  Abnormal ECG  When compared with ECG of 24-FEB-2021 17:52,  No significant change was found  Confirmed by Christa POWELL ([id number]) on 3/6/2021 2:13:23 PM     URINALYSIS W/ RFLX MICROSCOPIC    Collection Time: 03/06/21 12:50 AM   Result Value Ref Range    Color Yellow/Straw      Appearance Clear Clear      Specific gravity >1.030 (H) 1.003 - 1.030    pH (UA) 5.0 5.0 - 8.0      Protein Negative Negative mg/dL    Glucose Negative Negative mg/dL    Ketone Negative Negative mg/dL    Bilirubin Negative Negative      Blood Negative Negative      Urobilinogen 0.1 0.1 - 1.0 EU/dL    Nitrites Negative Negative      Leukocyte Esterase Negative Negative      WBC 0-4 0 - 4 /hpf    RBC 0-5 0 - 5 /hpf    Bacteria Negative Negative /hpf   CBC W/O DIFF    Collection Time: 03/06/21 12:25 PM   Result Value Ref Range    WBC 7.8 4.1 - 11.1 K/uL    RBC 2.94 (L) 4.10 - 5.70 M/uL    HGB 8.0 (L) 12.1 - 17.0 g/dL    HCT 26.1 (L) 36.6 - 50.3 %    MCV 88.8 80.0 - 99.0 FL    MCH 27.2 26.0 - 34.0 PG    MCHC 30.7 30.0 - 36.5 g/dL    RDW 14.6 (H) 11.5 - 14.5 %    PLATELET 203 146 - 015 K/uL    MPV 10.2 8.9 - 12.9 FL   PROCALCITONIN    Collection Time: 03/06/21 12:25 PM   Result Value Ref Range    Procalcitonin <0.05 (H) 0 ng/mL   METABOLIC PANEL, COMPREHENSIVE    Collection Time: 03/06/21 12:25 PM   Result Value Ref Range    Sodium 139 136 - 145 mmol/L    Potassium 3.7 3.5 - 5.1 mmol/L    Chloride 108 97 - 108 mmol/L    CO2 28 21 - 32 mmol/L    Anion gap 3 (L) 5 - 15 mmol/L    Glucose 115 (H) 65 - 100 mg/dL    BUN 21 (H) 6 - 20 mg/dL    Creatinine 1.10 0.70 - 1.30 mg/dL    BUN/Creatinine ratio 19 12 - 20      GFR est AA >60 >60 ml/min/1.73m2    GFR est non-AA >60 >60 ml/min/1.73m2    Calcium 8.7 8.5 - 10.1 mg/dL    Bilirubin, total 0.5 0.2 - 1.0 mg/dL    AST (SGOT) 23 15 - 37 U/L    ALT (SGPT) 34 12 - 78 U/L    Alk. phosphatase 61 45 - 117 U/L    Protein, total 6.6 6.4 - 8.2 g/dL    Albumin 3.1 (L) 3.5 - 5.0 g/dL    Globulin 3.5 2.0 - 4.0 g/dL    A-G Ratio 0.9 (L) 1.1 - 2.2         XR CHEST PORT   Final Result          Active Problems:    Rectal bleeding (3/5/2021)      Abdominal pain (3/5/2021)      Chronic constipation (3/5/2021)      Constipation (3/5/2021)        Assessment/Plan:     1. Abdominal pain and distention:  2. Recent SBO:  3. Chronic constipation:  4.   Rectal bleeding:  · CT scan of abdomen did not show gas formation or obstruction, positive diverticulosis without diverticulitis  · We will hold Plavix and aspirin  · IV fluids, IV Protonix, clear liquid diet, IV morphine for pain  · Consult GI  · Consult general surgery  · Monitor hemoglobin     5. CAD, MI, stent placements:  · Continue metoprolol changed to IV  · Holding aspirin and Plavix for lower GI bleeding     6. PAPITO on CKD:  · BUN and creatinine elevated since last admission  · IV fluids  · Monitor labs         DVT Prophylaxis: SCDs  Code Status: Full Code  POA/NOK:  ______________________________________________________________________________  Time spent in direct care including coordination of service, review of data and examination: > 35 minutes  Care Plan discussed with: Patient, RN and consult  ______________________________________________________________________________    Shraddha Yap NP    This is dictation was done by dragon, computer voice recognition software. Quite often unanticipated grammatical, syntax, homophones and other interpretive errors or inadvertently transcribed by the computer software. Please excuse errors that have escaped final proofreading. Thank you.
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
## 2021-03-06 NOTE — CONSULTS
[id number] [facility] SURGERY CONSULT          Chief Complaint: rectal bleed x 2 days    History of Present Illness:    Mr. Li Hdz is a 77y.o. year old * male admitted with 2 day history of painless rectal bleed for the past 2 days. No nausea or vomiting. He has been on Plavix. Never had a colonoscopy. Past Medical History:   Past Medical History:   Diagnosis Date    Arthritis     Bowel obstruction ([address]     High cholesterol     Hx of abdominal surgery     for SBO    Hypertension        Past Surgical History: Exploratory Laparotomy for bowel obstruction. Allergy:No Known Allergies    Social History:  reports that he quit smoking about 2 months ago. He has a 0.25 pack-year smoking history. He has never used smokeless tobacco. He reports that he does not drink alcohol or use drugs.      Family History:  Family History   Problem Relation Age of Onset    Hypertension Mother     No Known Problems Father     Hypertension Other         Current Medications:  Current Facility-Administered Medications:     atorvastatin (LIPITOR) tablet 40 mg, 40 mg, Oral, DAILY, Johnny Betts NP, 40 mg at 03/06/21 2111    isosorbide mononitrate ER (IMDUR) tablet 60 mg, 60 mg, Oral, 7am, Johnny Betts NP, 60 mg at 03/06/21 0604    traZODone (DESYREL) tablet 50 mg, 50 mg, Oral, QHS PRN, Johnny Betts NP, 50 mg at 03/05/21 2331    sodium chloride (NS) flush 5-40 mL, 5-40 mL, IntraVENous, Q8H, Johnny Betts NP, 10 mL at 03/06/21 0604    sodium chloride (NS) flush 5-40 mL, 5-40 mL, IntraVENous, PRN, Johnny Betts NP  [address] [place] [address]  acetaminophen (TYLENOL) tablet 650 mg, 650 mg, Oral, Q6H PRN **OR** acetaminophen (TYLENOL) suppository 650 mg, 650 mg, Rectal, Q6H PRN, Johnny Betts NP    polyethylene glycol (MIRALAX) packet 17 g, 17 g, Oral, DAILY PRN, Johnny Betts NP    bisacodyL (DULCOLAX) tablet 5 mg, 5 mg, Oral, DAILY PRN, Johnny Betts NP    ondansetron (ZOFRAN ODT) tablet 4 mg, 4 mg, Oral, Q6H PRN **OR** ondansetron (ZOFRAN) injection 4 mg, 4 mg, IntraVENous, Q6H PRN, Mei Prince, NP    metoprolol (LOPRESSOR) injection 2.5 mg, 2.5 mg, IntraVENous, Q6H, Mei Prince, NP, 2.5 mg at 03/06/21 0603    pantoprazole (PROTONIX) 40 mg in 0.9% sodium chloride 10 mL injection, 40 mg, IntraVENous, Q12H, Mei Sunman, NP, 40 mg at 03/05/21 2313    0.9% sodium chloride infusion, 125 mL/hr, IntraVENous, CONTINUOUS, Mei Sunman, NP, Last Rate: 125 mL/hr at 03/06/21 0350, 125 mL/hr at 03/06/21 0350    lactulose (CHRONULAC) 10 gram/15 mL solution 30 mL, 20 g, Oral, TID, Mei Sunman, NP, 30 mL at 03/06/21 0925    [START ON 3/7/2021] famotidine (PF) (PEPCID) 20 mg in 0.9% sodium chloride 10 mL injection, 20 mg, IntraVENous, Q12H, Mei Prince, NP    morphine injection 2 mg, 2 mg, IntraVENous, Q2H PRN, Mei Prince, NP    ferrous sulfate tablet 325 mg, 1 Tab, Oral, BID WITH MEALS, Ronny Najera MD, 325 mg at 03/06/21 9952     Immunization History: There is no immunization history on file for this patient. Complete    Review of Systems:     Constitutional:  no fever,  no chills,  no sweats, No weakness, No fatigue, No decreased activity. Eye: No recent visual problem, No icterus, No discharge, No double vision. Ear/Nose/Mouth/Throat: No decreased hearing, No ear pain, No nasal congestion, No sore throat. Respiratory: No shortness of breath, No cough, No sputum production, No hemoptysis, No wheezing, No cyanosis. Cardiovascular: No chest pain, No palpitations, No bradycardia, No tachycardia, No peripheral edema, No syncope. Gastrointestinal: No nausea,  No vomiting, No diarrhea, No constipation, No heartburn,  No abdominal pain, rectal bleeding. .  Genitourinary: No dysuria, No hematuria, No change in urine stream, No urethral discharge, No lesions. Hematology/Lymphatics: No bruising tendency, No bleeding tendency, No petechiae, No swollen lymph glands.   Endocrine: No excessive thirst, No polyuria, No cold intolerance, No heat intolerance, No excessive hunger. Immunologic: Not immunocompromised, No recurrent fevers, No recurrent infections. Musculoskeletal: No back pain, No neck pain, No joint pain, No muscle pain, No claudication, No decreased range of motion, No trauma. Integumentary: No rash, No pruritus, No abrasions. Neurologic: Alert and oriented X4, No abnormal balance, No headache, No confusion, No numbness, No tingling. Psychiatric: No anxiety, No depression, No winnie. Physical Exam:     Vitals & Measurements: Wt Readings from Last 3 Encounters:   03/05/21 112.5 kg (248 lb)   03/05/21 117 kg (258 lb)   02/24/21 117 kg (258 lb)     Temp Readings from Last 3 Encounters:   03/06/21 98 °F (36.7 °C)   03/05/21 98.3 °F (36.8 °C)   03/01/21 98.3 °F (36.8 °C)     BP Readings from Last 3 Encounters:   03/06/21 134/64   03/05/21 132/79   03/01/21 120/72     Pulse Readings from Last 3 Encounters:   03/06/21 (!) 101   03/05/21 94   03/01/21 97      Ht Readings from Last 3 Encounters:   03/05/21 5' 9\" (1.753 m)   03/05/21 5' 9\" (1.753 m)   02/24/21 5' 9\" (1.753 m)          General: well appearing, no acute distress  Head: Normal  Face: Nornal  HEENT: atraumatic, PERRLA, moist mucosa, normal pharynx, normal tonsils and adenoids, normal tongue, no fluid in sinuses  Neck: Trachea midline, no carotid bruit, no masses  Chest: Normal.  Respiratory: Normal chest wall expansion, CTA B, no r/r/w, no rubs  Cardiovascular: RRR, no m/r/g, Normal S1 and S2  Abdomen: Soft, non tender, non-distended, normal bowel sounds in all quadrants, no hepatosplenomegaly, no tympany. Incision scar: Long midline scar. Genitourinary: No inguinal hernia, normal external gentalia, Testis & scrotum normal, no renal angle tenderness  Rectal: Some blood. Musculoskeletal: normal ROM in upper and lower extremities, No joint swelling.   Integumentary: Warm, dry, and pink, with no rash, purpura, or petechia  Heme/Lymph: No lymphadenopathy, no bruises  Neurological:Cranial Nerves II-XII grossly intact, no gross motor or sensory deficit  Psychiatric: Cooperative with normal mood, affect, and cognition      Laboratory Values:   Recent Results (from the past 24 hour(s))   CBC WITH AUTOMATED DIFF    Collection Time: 03/05/21  1:24 PM   Result Value Ref Range    WBC 8.2 4.4 - 11.3 K/uL    RBC 3.33 (L) 4.50 - 5.90 M/uL    HGB 9.3 (L) 13.5 - 17.5 g/dL    HCT 28.1 (L) 41 - 53 %    MCV 84.6 80 - 100 FL    MCH 27.9 (L) 31 - 34 PG    MCHC 33.0 31.0 - 36.0 g/dL    RDW 14.9 (H) 11.5 - 14.5 %    PLATELET 835 K/uL    MPV 8.8 6.5 - 11.5 FL    NRBC 0.0  WBC    ABSOLUTE NRBC 0.00 K/uL    NEUTROPHILS 57 42 - 75 %    LYMPHOCYTES 27 20.5 - 51.1 %    MONOCYTES 10 (H) 1.7 - 9.3 %    EOSINOPHILS 5 (H) 0.9 - 2.9 %    BASOPHILS 1 0.0 - 2.5 %    ABS. NEUTROPHILS 4.7 1.8 - 7.7 K/UL    ABS. LYMPHOCYTES 2.2 1.0 - 4.8 K/UL    ABS. MONOCYTES 0.8 0.2 - 2.4 K/UL    ABS. EOSINOPHILS 0.4 0.0 - 0.7 K/UL    ABS. BASOPHILS 0.1 0.0 - 0.2 K/UL   METABOLIC PANEL, COMPREHENSIVE    Collection Time: 03/05/21  1:24 PM   Result Value Ref Range    Sodium 140 136 - 145 mmol/L    Potassium 3.7 3.5 - 5.1 mmol/L    Chloride 105 97 - 108 mmol/L    CO2 26 21 - 32 mmol/L    Anion gap 9 5 - 15 mmol/L    Glucose 136 (H) 65 - 100 mg/dL    BUN 27 (H) 6 - 20 mg/dL    Creatinine 1.39 (H) 0.70 - 1.30 mg/dL    BUN/Creatinine ratio 19 12 - 20      GFR est AA >60 >60 ml/min/1.73m2    GFR est non-AA 50 (L) >60 ml/min/1.73m2    Calcium 9.0 8.5 - 10.1 mg/dL    Bilirubin, total 0.4 0.2 - 1.0 mg/dL    AST (SGOT) 28 15 - 37 U/L    ALT (SGPT) 37 12 - 78 U/L    Alk.  phosphatase 60 45 - 117 U/L    Protein, total 6.8 6.4 - 8.2 g/dL    Albumin 3.2 (L) 3.5 - 5.0 g/dL    Globulin 3.6 2.0 - 4.0 g/dL    A-G Ratio 0.9 (L) 1.1 - 2.2     PROTHROMBIN TIME + INR    Collection Time: 03/05/21  1:24 PM   Result Value Ref Range    Prothrombin time 10.4 9.0 - 11.1 sec    INR 1.1 0.9 - 1.1     TYPE & SCREEN Collection Time: 03/05/21  1:24 PM   Result Value Ref Range    Crossmatch Expiration 03/08/2021,2359     ABO/Rh(D) O Positive     Antibody screen Negative    OCCULT BLOOD, STOOL    Collection Time: 03/05/21  6:40 PM   Result Value Ref Range    Occult Blood,day 1 Positive (A) Negative     CBC WITH AUTOMATED DIFF    Collection Time: 03/05/21  7:00 PM   Result Value Ref Range    WBC 7.9 4.1 - 11.1 K/uL    RBC 3.25 (L) 4.10 - 5.70 M/uL    HGB 8.9 (L) 12.1 - 17.0 g/dL    HCT 28.6 (L) 36.6 - 50.3 %    MCV 88.0 80.0 - 99.0 FL    MCH 27.4 26.0 - 34.0 PG    MCHC 31.1 30.0 - 36.5 g/dL    RDW 14.5 11.5 - 14.5 %    PLATELET 806 048 - 708 K/uL    MPV 10.6 8.9 - 12.9 FL    NEUTROPHILS 59 32 - 75 %    LYMPHOCYTES 27 12 - 49 %    MONOCYTES 8 5 - 13 %    EOSINOPHILS 4 0 - 7 %    BASOPHILS 1 0 - 1 %    IMMATURE GRANULOCYTES 1 (H) 0.0 - 0.5 %    ABS. NEUTROPHILS 4.8 1.8 - 8.0 K/UL    ABS. LYMPHOCYTES 2.1 0.8 - 3.5 K/UL    ABS. MONOCYTES 0.6 0.0 - 1.0 K/UL    ABS. EOSINOPHILS 0.4 0.0 - 0.4 K/UL    ABS. BASOPHILS 0.1 0.0 - 0.1 K/UL    ABS. IMM.  GRANS. 0.1 (H) 0.00 - 0.04 K/UL    DF AUTOMATED     TYPE & SCREEN    Collection Time: 03/05/21  7:00 PM   Result Value Ref Range    Crossmatch Expiration 03/08/2021,2359     ABO/Rh(D) Deidre Mustafa Positive     Antibody screen Negative    TROPONIN I    Collection Time: 03/05/21  7:00 PM   Result Value Ref Range    Troponin-I, Qt. <0.05 <0.05 ng/mL   LIPASE    Collection Time: 03/05/21  7:00 PM   Result Value Ref Range    Lipase 215 73 - 393 U/L   URINALYSIS W/ RFLX MICROSCOPIC    Collection Time: 03/06/21 12:50 AM   Result Value Ref Range    Color Yellow/Straw      Appearance Clear Clear      Specific gravity >1.030 (H) 1.003 - 1.030    pH (UA) 5.0 5.0 - 8.0      Protein Negative Negative mg/dL    Glucose Negative Negative mg/dL    Ketone Negative Negative mg/dL    Bilirubin Negative Negative      Blood Negative Negative      Urobilinogen 0.1 0.1 - 1.0 EU/dL    Nitrites Negative Negative      Leukocyte Esterase Negative Negative      WBC 0-4 0 - 4 /hpf    RBC 0-5 0 - 5 /hpf    Bacteria Negative Negative /hpf       Assessment:  Problem List Items Addressed This Visit     None      Visit Diagnoses     Gastrointestinal hemorrhage, unspecified gastrointestinal hemorrhage type    -  Primary        Most likely Diverticular or Hemorrhoidal      Hemoglobin stable. Plan:    1. Admission  2. Diet as tolerated today. 3. SCD  4. IS  5. Pain medications  6. Nausea medication  7. Labs: H&H Q8 hours. 8. Consult: GI consultation for Colonoscopy       Thank you for the consultation & allowing me to participate in the care of this patient.

## 2021-03-07 ENCOUNTER — APPOINTMENT (OUTPATIENT)
Dept: ENDOSCOPY | Age: 76
DRG: 393 | End: 2021-03-07
Attending: INTERNAL MEDICINE
Payer: MEDICARE

## 2021-03-07 LAB
ANION GAP SERPL CALC-SCNC: 6 MMOL/L (ref 5–15)
ATRIAL RATE: 90 BPM
BASOPHILS # BLD: 0 K/UL (ref 0–0.1)
BASOPHILS NFR BLD: 1 % (ref 0–1)
BUN SERPL-MCNC: 13 MG/DL (ref 6–20)
BUN/CREAT SERPL: 15 (ref 12–20)
CA-I BLD-MCNC: 7.8 MG/DL (ref 8.5–10.1)
CALCULATED P AXIS, ECG09: 57 DEGREES
CALCULATED R AXIS, ECG10: -6 DEGREES
CALCULATED T AXIS, ECG11: 30 DEGREES
CHLORIDE SERPL-SCNC: 109 MMOL/L (ref 97–108)
CO2 SERPL-SCNC: 25 MMOL/L (ref 21–32)
CREAT SERPL-MCNC: 0.87 MG/DL (ref 0.7–1.3)
DIAGNOSIS, 93000: NORMAL
DIFFERENTIAL METHOD BLD: ABNORMAL
EOSINOPHIL # BLD: 0.6 K/UL (ref 0–0.4)
EOSINOPHIL NFR BLD: 8 % (ref 0–7)
ERYTHROCYTE [DISTWIDTH] IN BLOOD BY AUTOMATED COUNT: 14.5 % (ref 11.5–14.5)
GLUCOSE SERPL-MCNC: 89 MG/DL (ref 65–100)
HCT VFR BLD AUTO: 22.1 % (ref 36.6–50.3)
HGB BLD-MCNC: 6.9 G/DL (ref 12.1–17)
IMM GRANULOCYTES # BLD AUTO: 0.1 K/UL (ref 0–0.04)
IMM GRANULOCYTES NFR BLD AUTO: 1 % (ref 0–0.5)
LYMPHOCYTES # BLD: 1.7 K/UL (ref 0.8–3.5)
LYMPHOCYTES NFR BLD: 23 % (ref 12–49)
MCH RBC QN AUTO: 27.5 PG (ref 26–34)
MCHC RBC AUTO-ENTMCNC: 31.2 G/DL (ref 30–36.5)
MCV RBC AUTO: 88 FL (ref 80–99)
MONOCYTES # BLD: 0.7 K/UL (ref 0–1)
MONOCYTES NFR BLD: 9 % (ref 5–13)
NEUTS SEG # BLD: 4.3 K/UL (ref 1.8–8)
NEUTS SEG NFR BLD: 58 % (ref 32–75)
P-R INTERVAL, ECG05: 170 MS
PLATELET # BLD AUTO: 217 K/UL (ref 150–400)
PMV BLD AUTO: 10.6 FL (ref 8.9–12.9)
POTASSIUM SERPL-SCNC: 3.8 MMOL/L (ref 3.5–5.1)
Q-T INTERVAL, ECG07: 366 MS
QRS DURATION, ECG06: 108 MS
QTC CALCULATION (BEZET), ECG08: 447 MS
RBC # BLD AUTO: 2.51 M/UL (ref 4.1–5.7)
SODIUM SERPL-SCNC: 140 MMOL/L (ref 136–145)
TROPONIN I SERPL-MCNC: <0.05 NG/ML
VENTRICULAR RATE, ECG03: 90 BPM
WBC # BLD AUTO: 7.2 K/UL (ref 4.1–11.1)

## 2021-03-07 PROCEDURE — 74011250636 HC RX REV CODE- 250/636: Performed by: NURSE PRACTITIONER

## 2021-03-07 PROCEDURE — 30233N1 TRANSFUSION OF NONAUTOLOGOUS RED BLOOD CELLS INTO PERIPHERAL VEIN, PERCUTANEOUS APPROACH: ICD-10-PCS | Performed by: INTERNAL MEDICINE

## 2021-03-07 PROCEDURE — 84484 ASSAY OF TROPONIN QUANT: CPT

## 2021-03-07 PROCEDURE — P9016 RBC LEUKOCYTES REDUCED: HCPCS

## 2021-03-07 PROCEDURE — 36415 COLL VENOUS BLD VENIPUNCTURE: CPT

## 2021-03-07 PROCEDURE — 74011000250 HC RX REV CODE- 250: Performed by: NURSE PRACTITIONER

## 2021-03-07 PROCEDURE — 36430 TRANSFUSION BLD/BLD COMPNT: CPT

## 2021-03-07 PROCEDURE — 74011250637 HC RX REV CODE- 250/637: Performed by: INTERNAL MEDICINE

## 2021-03-07 PROCEDURE — 65270000029 HC RM PRIVATE

## 2021-03-07 PROCEDURE — 74011000250 HC RX REV CODE- 250: Performed by: INTERNAL MEDICINE

## 2021-03-07 PROCEDURE — 74011250637 HC RX REV CODE- 250/637: Performed by: NURSE PRACTITIONER

## 2021-03-07 PROCEDURE — 80048 BASIC METABOLIC PNL TOTAL CA: CPT

## 2021-03-07 PROCEDURE — 74011250636 HC RX REV CODE- 250/636: Performed by: INTERNAL MEDICINE

## 2021-03-07 PROCEDURE — 85025 COMPLETE CBC W/AUTO DIFF WBC: CPT

## 2021-03-07 RX ORDER — MORPHINE SULFATE 2 MG/ML
2 INJECTION, SOLUTION INTRAMUSCULAR; INTRAVENOUS
Status: DISPENSED | OUTPATIENT
Start: 2021-03-07 | End: 2021-03-09

## 2021-03-07 RX ORDER — SODIUM CHLORIDE 9 MG/ML
250 INJECTION, SOLUTION INTRAVENOUS AS NEEDED
Status: DISCONTINUED | OUTPATIENT
Start: 2021-03-07 | End: 2021-03-09 | Stop reason: HOSPADM

## 2021-03-07 RX ORDER — FENTANYL CITRATE 50 UG/ML
25 INJECTION, SOLUTION INTRAMUSCULAR; INTRAVENOUS ONCE
Status: COMPLETED | OUTPATIENT
Start: 2021-03-07 | End: 2021-03-08

## 2021-03-07 RX ADMIN — ISOSORBIDE MONONITRATE 60 MG: 30 TABLET, EXTENDED RELEASE ORAL at 06:11

## 2021-03-07 RX ADMIN — METOPROLOL TARTRATE 2.5 MG: 5 INJECTION INTRAVENOUS at 13:00

## 2021-03-07 RX ADMIN — POLYETHYLENE GLYCOL 3350, SODIUM SULFATE ANHYDROUS, SODIUM BICARBONATE, SODIUM CHLORIDE, POTASSIUM CHLORIDE 4000 ML: 236; 22.74; 6.74; 5.86; 2.97 POWDER, FOR SOLUTION ORAL at 14:02

## 2021-03-07 RX ADMIN — ATORVASTATIN CALCIUM 40 MG: 40 TABLET, FILM COATED ORAL at 08:29

## 2021-03-07 RX ADMIN — METOPROLOL TARTRATE 2.5 MG: 5 INJECTION INTRAVENOUS at 06:11

## 2021-03-07 RX ADMIN — FERROUS SULFATE TAB 325 MG (65 MG ELEMENTAL FE) 325 MG: 325 (65 FE) TAB at 08:29

## 2021-03-07 RX ADMIN — FERROUS SULFATE TAB 325 MG (65 MG ELEMENTAL FE) 325 MG: 325 (65 FE) TAB at 17:18

## 2021-03-07 RX ADMIN — Medication 10 ML: at 14:00

## 2021-03-07 RX ADMIN — Medication 10 ML: at 06:00

## 2021-03-07 RX ADMIN — SODIUM CHLORIDE 125 ML/HR: 9 INJECTION, SOLUTION INTRAVENOUS at 09:46

## 2021-03-07 RX ADMIN — MORPHINE SULFATE 2 MG: 2 INJECTION, SOLUTION INTRAMUSCULAR; INTRAVENOUS at 06:22

## 2021-03-07 RX ADMIN — FAMOTIDINE 20 MG: 10 INJECTION, SOLUTION INTRAVENOUS at 21:22

## 2021-03-07 RX ADMIN — NITROGLYCERIN 1 INCH: 20 OINTMENT TOPICAL at 21:21

## 2021-03-07 RX ADMIN — MORPHINE SULFATE 2 MG: 2 INJECTION, SOLUTION INTRAMUSCULAR; INTRAVENOUS at 19:21

## 2021-03-07 RX ADMIN — METOPROLOL TARTRATE 2.5 MG: 5 INJECTION INTRAVENOUS at 17:19

## 2021-03-07 NOTE — ROUTINE PROCESS
Report given to Sarah Jeffries RN oncoming nurse to include sbar, mar, kardex, recent order and changes

## 2021-03-07 NOTE — PROGRESS NOTES
Progress Note    Patient: Anita Storm MRN: [id number]  SSN: [id number]    YOB: 1945  Age: 77 y.o. Sex: male      Admit Date: 3/5/2021    LOS: 2 days     Subjective:   Patient examined, patient still on clear liquids, today had no vomiting, no nausea vomiting abdominal pain.   Past Medical History:   Diagnosis Date    Arthritis     Bowel obstruction ([facility] [address]     High cholesterol     Hx of abdominal surgery     for SBO    Hypertension         Current Facility-Administered Medications:     0.9% sodium chloride infusion 250 mL, 250 mL, IntraVENous, PRN, Ernie White NP    atorvastatin (LIPITOR) tablet 40 mg, 40 mg, Oral, DAILY, Ernie White NP, 40 mg at 03/07/21 0643    isosorbide mononitrate ER (IMDUR) tablet 60 mg, 60 mg, Oral, 7am, Ernie White NP, 60 mg at 03/07/21 8338    traZODone (DESYREL) tablet 50 mg, 50 mg, Oral, QHS PRN, Ernie White NP, 50 mg at 03/06/21 2341    sodium chloride (NS) flush 5-40 mL, 5-40 mL, IntraVENous, Q8H, Ernie White NP, 10 mL at 03/07/21 0600    sodium chloride (NS) flush 5-40 mL, 5-40 mL, IntraVENous, PRN, Ernie White NP  [facility]  acetaminophen (TYLENOL) tablet 650 mg, 650 mg, Oral, Q6H PRN **OR** acetaminophen (TYLENOL) suppository 650 mg, 650 mg, Rectal, Q6H PRN, Ernie White NP    polyethylene glycol (MIRALAX) packet 17 g, 17 g, Oral, DAILY PRN, Ernie White NP    bisacodyL (DULCOLAX) tablet 5 mg, 5 mg, Oral, DAILY PRN, Ernie White NP    ondansetron (ZOFRAN ODT) tablet 4 mg, 4 mg, Oral, Q6H PRN **OR** ondansetron (ZOFRAN) injection 4 mg, 4 mg, IntraVENous, Q6H PRN, Ernie White NP, 4 mg at 03/06/21 2341    metoprolol (LOPRESSOR) injection 2.5 mg, 2.5 mg, IntraVENous, Q6H, Ernie White, KWABENA, 2.5 mg at 03/07/21 0611    0.9% sodium chloride infusion, 125 mL/hr, IntraVENous, CONTINUOUS, Ernie White NP, Last Rate: 125 mL/hr at 03/07/21 0946, 125 mL/hr at 03/07/21 0946    famotidine (PF) (PEPCID) 20 mg in 0.9% sodium chloride 10 mL injection, 20 mg, IntraVENous, Q12H, Eva Campbell NP, 20 mg at 03/06/21 1159    morphine injection 2 mg, 2 mg, IntraVENous, Q2H PRN, Eva Campbell NP, 2 mg at 03/07/21 2435    ferrous sulfate tablet 325 mg, 1 Tab, Oral, BID WITH MEALS, Gatito Bowen MD, 325 mg at 03/07/21 0829    Objective:     Vitals:    03/06/21 2132 03/07/21 0428 03/07/21 0607 03/07/21 0919   BP: (!) 154/74  127/68 (!) 124/55   Pulse: 90  (!) 109 87   Resp: 20  20 18   Temp: 97.9 °F (36.6 °C)  98.3 °F (36.8 °C) 98.2 °F (36.8 °C)   SpO2: 97%  97% 98%   Weight:  115.3 kg (254 lb 3.1 oz)     Height:            Intake and Output:  Current Shift: No intake/output data recorded. Last three shifts: 03/05 1901 - 03/07 0700  In: -   Out: 300 [Urine:300]    Physical Exam:   Physical Exam   Constitutional: No distress. HENT:   Head: Normocephalic and atraumatic. Eyes: Pupils are equal, round, and reactive to light. Conjunctivae are normal.   Neck: Normal range of motion. Neck supple. Cardiovascular: Normal heart sounds. Pulmonary/Chest: Effort normal.   Abdominal: Soft. Bowel sounds are normal.   Musculoskeletal: Normal range of motion. Neurological: He is alert. Skin: Skin is dry. Psychiatric: He has a normal mood and affect.         Lab/Data Review:  Recent Results (from the past 24 hour(s))   CBC W/O DIFF    Collection Time: 03/06/21 12:25 PM   Result Value Ref Range    WBC 7.8 4.1 - 11.1 K/uL    RBC 2.94 (L) 4.10 - 5.70 M/uL    HGB 8.0 (L) 12.1 - 17.0 g/dL    HCT 26.1 (L) 36.6 - 50.3 %    MCV 88.8 80.0 - 99.0 FL    MCH 27.2 26.0 - 34.0 PG    MCHC 30.7 30.0 - 36.5 g/dL    RDW 14.6 (H) 11.5 - 14.5 %    PLATELET 579 831 - 942 K/uL    MPV 10.2 8.9 - 12.9 FL   C REACTIVE PROTEIN, QT    Collection Time: 03/06/21 12:25 PM   Result Value Ref Range    C-Reactive protein 0.82 (H) 0.00 - 0.60 mg/dL   PROCALCITONIN    Collection Time: 03/06/21 12:25 PM   Result Value Ref Range    Procalcitonin <0.05 (H) 0 ng/mL   METABOLIC PANEL, COMPREHENSIVE    Collection Time: 03/06/21 12:25 PM   Result Value Ref Range    Sodium 139 136 - 145 mmol/L    Potassium 3.7 3.5 - 5.1 mmol/L    Chloride 108 97 - 108 mmol/L    CO2 28 21 - 32 mmol/L    Anion gap 3 (L) 5 - 15 mmol/L    Glucose 115 (H) 65 - 100 mg/dL    BUN 21 (H) 6 - 20 mg/dL    Creatinine 1.10 0.70 - 1.30 mg/dL    BUN/Creatinine ratio 19 12 - 20      GFR est AA >60 >60 ml/min/1.73m2    GFR est non-AA >60 >60 ml/min/1.73m2    Calcium 8.7 8.5 - 10.1 mg/dL    Bilirubin, total 0.5 0.2 - 1.0 mg/dL    AST (SGOT) 23 15 - 37 U/L    ALT (SGPT) 34 12 - 78 U/L    Alk.  phosphatase 61 45 - 117 U/L    Protein, total 6.6 6.4 - 8.2 g/dL    Albumin 3.1 (L) 3.5 - 5.0 g/dL    Globulin 3.5 2.0 - 4.0 g/dL    A-G Ratio 0.9 (L) 1.1 - 2.2     EKG, 12 LEAD, INITIAL    Collection Time: 03/06/21  9:47 PM   Result Value Ref Range    Ventricular Rate 90 BPM    Atrial Rate 90 BPM    P-R Interval 170 ms    QRS Duration 108 ms    Q-T Interval 366 ms    QTC Calculation (Bezet) 447 ms    Calculated P Axis 57 degrees    Calculated R Axis -6 degrees    Calculated T Axis 30 degrees    Diagnosis       Sinus rhythm with occasional Premature ventricular complexes  Incomplete right bundle branch block  Borderline ECG  No previous ECGs available  Confirmed by Fanta Antonio MD, [facility] ([id number]) on 3/7/2021 10:14:10 AM     TROPONIN I    Collection Time: 03/07/21  1:24 AM   Result Value Ref Range    Troponin-I, Qt. <0.05 <0.05 ng/mL   CBC WITH AUTOMATED DIFF    Collection Time: 03/07/21  1:24 AM   Result Value Ref Range    WBC 7.2 4.1 - 11.1 K/uL    RBC 2.51 (L) 4.10 - 5.70 M/uL    HGB 6.9 (L) 12.1 - 17.0 g/dL    HCT 22.1 (L) 36.6 - 50.3 %    MCV 88.0 80.0 - 99.0 FL    MCH 27.5 26.0 - 34.0 PG    MCHC 31.2 30.0 - 36.5 g/dL    RDW 14.5 11.5 - 14.5 %    PLATELET 776 112 - 955 K/uL    MPV 10.6 8.9 - 12.9 FL    NEUTROPHILS 58 32 - 75 %    LYMPHOCYTES 23 12 - 49 %    MONOCYTES 9 5 - 13 %    EOSINOPHILS 8 (H) 0 - 7 %    BASOPHILS 1 0 - 1 %    IMMATURE GRANULOCYTES 1 (H) 0.0 - 0.5 %    ABS. NEUTROPHILS 4.3 1.8 - 8.0 K/UL    ABS. LYMPHOCYTES 1.7 0.8 - 3.5 K/UL    ABS. MONOCYTES 0.7 0.0 - 1.0 K/UL    ABS. EOSINOPHILS 0.6 (H) 0.0 - 0.4 K/UL    ABS. BASOPHILS 0.0 0.0 - 0.1 K/UL    ABS. IMM. GRANS. 0.1 (H) 0.00 - 0.04 K/UL    DF AUTOMATED     METABOLIC PANEL, BASIC    Collection Time: 03/07/21  1:24 AM   Result Value Ref Range    Sodium 140 136 - 145 mmol/L    Potassium 3.8 3.5 - 5.1 mmol/L    Chloride 109 (H) 97 - 108 mmol/L    CO2 25 21 - 32 mmol/L    Anion gap 6 5 - 15 mmol/L    Glucose 89 65 - 100 mg/dL    BUN 13 6 - 20 mg/dL    Creatinine 0.87 0.70 - 1.30 mg/dL    BUN/Creatinine ratio 15 12 - 20      GFR est AA >60 >60 ml/min/1.73m2    GFR est non-AA >60 >60 ml/min/1.73m2    Calcium 7.8 (L) 8.5 - 10.1 mg/dL        XR CHEST PORT   Final Result           Assessment:     Active Problems:    Rectal bleeding (3/5/2021)      Abdominal pain (3/5/2021)      Chronic constipation (3/5/2021)      Constipation (3/5/2021)    Abdominal pain and distention, possible SBO: Surgery consultation notes noted, no evidence of bowel obstruction, abdominal pain resolved,    Chronic constipation:   Rectal bleeding:after his enema,\  CT scan of abdomen showed diverticulosis without diverticulitis  BUN and creatinine returned to baseline after hydration, may be due to volume depetion     nobleeding overnight,  Hemoglobin slightly dropped,           DVT Prophylaxis: SCDs     Plan:    hold Plavix and aspirin  IV fluids,   clear liquid diet,  Monitor hemoglobin  We will start on the bowel preparation with caution,  We will do a colonoscopy in the morning to evaluate if no GI tract for the history of rectal bleeding, the indication, risks, options, limitation of test discussed with patient.            Signed By: Adeola Soliz MD     March 7, 2021        Thank you for allowing me to participate in this patients care  Cc Referring Physician   Sandrita Arce MD

## 2021-03-07 NOTE — ROUTINE PROCESS
Bedside shift change report given to Kota Wesley RN (oncoming nurse) by Castillo Rascon RN (offgoing nurse). Report included the following information SBAR, Kardex, and MAR.

## 2021-03-07 NOTE — CONSULTS
Consult    Patient: Jersey Bailey MRN: [id number]  SSN: [id number]    YOB: 1945  Age: 77 y.o. Sex: male      Subjective:      Jersey Bailey is a 77 y.o. male who is being seen for abdominal pain, rectal bleeding. He come into the emergency room for 1 week abdominal pain, no abdominal pain, bloating, I saw the patient yesterday in the emergency room before he was admitted last night  He has had constipation for quite some time patient developed was  seen last week in the emergency room for constipation/ileus requiring fleets enema, NG tube and discharged home. patient had developed  rectal bleeding yesterday,  Patient takes Plavix and aspirin status post cardiac stent. CT of abdomen reveals diverticulosis without active diverticulitis, small bowel without obstruction. Will admit for further management of lower GI bleed. Consult GI. Past Medical History:   Diagnosis Date    Arthritis     Bowel obstruction ([address]     High cholesterol     Hx of abdominal surgery     for SBO    Hypertension      No past surgical history on file.    Family History   Problem Relation Age of Onset    Hypertension Mother     No Known Problems Father     Hypertension Other      Social History     Tobacco Use    Smoking status: Former Smoker     Packs/day: 0.50     Years: 0.50     Pack years: 0.25     Quit date: 2020     Years since quittin.2    Smokeless tobacco: Never Used   Substance Use Topics    Alcohol use: Never     Frequency: Never      Current Facility-Administered Medications   Medication Dose Route Frequency Provider Last Rate Last Admin    atorvastatin (LIPITOR) tablet 40 mg  40 mg Oral DAILY Eva Campbell, NP   40 mg at 21 0925    isosorbide mononitrate ER (IMDUR) tablet 60 mg  60 mg Oral Bruce Bolus, NP   60 mg at 21 0604    traZODone (DESYREL) tablet 50 mg  50 mg Oral QHS PRN Eva Aldanaand, NP   50 mg at 21 2331    sodium chloride (NS) flush 5-40 mL  5-40 mL IntraVENous Q8H Ernie White, NP   10 mL at 03/06/21 1302    sodium chloride (NS) flush 5-40 mL  5-40 mL IntraVENous PRN Ernie White, NP        acetaminophen (TYLENOL) tablet 650 mg  650 mg Oral Q6H PRN Ernie Wihte, NP        Or   [facility] acetaminophen (TYLENOL) suppository 650 mg  650 mg Rectal Q6H PRN Ernie White, NP        polyethylene glycol (MIRALAX) packet 17 g  17 g Oral DAILY PRN Ernie White, NP        bisacodyL (DULCOLAX) tablet 5 mg  5 mg Oral DAILY PRN Ernie White, NP        ondansetron (ZOFRAN ODT) tablet 4 mg  4 mg Oral Q6H PRN Ernie White, NP        Or    [facility] injection 4 mg  4 mg IntraVENous Q6H PRN Ernie White, NP   4 mg at 03/06/21 1327    metoprolol (LOPRESSOR) injection 2.5 mg  2.5 mg IntraVENous Q6H Ernie White, NP   2.5 mg at 03/06/21 1730    pantoprazole (PROTONIX) 40 mg in 0.9% sodium chloride 10 mL injection  40 mg IntraVENous Q12H Ernie White, NP   40 mg at 03/06/21 0900    0.9% sodium chloride infusion  125 mL/hr IntraVENous CONTINUOUS Ernie White,  mL/hr at 03/06/21 1439 125 mL/hr at 03/06/21 1439    [START ON 3/7/2021] famotidine (PF) (PEPCID) 20 mg in 0.9% sodium chloride 10 mL injection  20 mg IntraVENous Q12H Ernie White, NP   20 mg at 03/06/21 1159    morphine injection 2 mg  2 mg IntraVENous Q2H PRN Ernie White, NP        ferrous sulfate tablet 325 mg  1 Tab Oral BID WITH MEALS Kayla Marie MD   325 mg at 03/06/21 1642        No Known Allergies    Review of Systems:  Review of Systems   Constitutional: Positive for malaise/fatigue. HENT: Negative. Eyes: Negative. Respiratory: Negative. Cardiovascular: Negative. Gastrointestinal: Positive for blood in stool, constipation and melena. Genitourinary: Negative. Skin: Negative. Neurological: Negative.          Objective:     Vitals:    03/06/21 0600 03/06/21 1225 03/06/21 1550 03/06/21 1730   BP: 134/64 130/70 117/68 117/68   Pulse: (!) 101 100 87 87   Resp: 18      Temp: 98 °F (36.7 °C)  98.8 °F (37.1 °C)    SpO2: 98%  99%    Weight:       Height:            Physical Exam:  Physical Exam   Constitutional: He is oriented to person, place, and time. He appears distressed. HENT:   Head: Normocephalic and atraumatic. Eyes: Pupils are equal, round, and reactive to light. Conjunctivae are normal.   Neck: Normal range of motion. Neck supple. No JVD present. No tracheal deviation present. Pulmonary/Chest: Effort normal. He has no wheezes. Abdominal: Soft. Bowel sounds are normal. He exhibits no mass. There is abdominal tenderness. There is no guarding. Musculoskeletal:         General: No edema. Neurological: He is oriented to person, place, and time.         Recent Results (from the past 24 hour(s))   URINALYSIS W/ RFLX MICROSCOPIC    Collection Time: 03/06/21 12:50 AM   Result Value Ref Range    Color Yellow/Straw      Appearance Clear Clear      Specific gravity >1.030 (H) 1.003 - 1.030    pH (UA) 5.0 5.0 - 8.0      Protein Negative Negative mg/dL    Glucose Negative Negative mg/dL    Ketone Negative Negative mg/dL    Bilirubin Negative Negative      Blood Negative Negative      Urobilinogen 0.1 0.1 - 1.0 EU/dL    Nitrites Negative Negative      Leukocyte Esterase Negative Negative      WBC 0-4 0 - 4 /hpf    RBC 0-5 0 - 5 /hpf    Bacteria Negative Negative /hpf   CBC W/O DIFF    Collection Time: 03/06/21 12:25 PM   Result Value Ref Range    WBC 7.8 4.1 - 11.1 K/uL    RBC 2.94 (L) 4.10 - 5.70 M/uL    HGB 8.0 (L) 12.1 - 17.0 g/dL    HCT 26.1 (L) 36.6 - 50.3 %    MCV 88.8 80.0 - 99.0 FL    MCH 27.2 26.0 - 34.0 PG    MCHC 30.7 30.0 - 36.5 g/dL    RDW 14.6 (H) 11.5 - 14.5 %    PLATELET 868 444 - 158 K/uL    MPV 10.2 8.9 - 12.9 FL   C REACTIVE PROTEIN, QT    Collection Time: 03/06/21 12:25 PM   Result Value Ref Range    C-Reactive protein 0.82 (H) 0.00 - 0.60 mg/dL   PROCALCITONIN    Collection Time: 03/06/21 12:25 PM   Result Value Ref Range    Procalcitonin <0.05 (H) 0 ng/mL   METABOLIC PANEL, COMPREHENSIVE    Collection Time: 03/06/21 12:25 PM   Result Value Ref Range    Sodium 139 136 - 145 mmol/L    Potassium 3.7 3.5 - 5.1 mmol/L    Chloride 108 97 - 108 mmol/L    CO2 28 21 - 32 mmol/L    Anion gap 3 (L) 5 - 15 mmol/L    Glucose 115 (H) 65 - 100 mg/dL    BUN 21 (H) 6 - 20 mg/dL    Creatinine 1.10 0.70 - 1.30 mg/dL    BUN/Creatinine ratio 19 12 - 20      GFR est AA >60 >60 ml/min/1.73m2    GFR est non-AA >60 >60 ml/min/1.73m2    Calcium 8.7 8.5 - 10.1 mg/dL    Bilirubin, total 0.5 0.2 - 1.0 mg/dL    AST (SGOT) 23 15 - 37 U/L    ALT (SGPT) 34 12 - 78 U/L    Alk.  phosphatase 61 45 - 117 U/L    Protein, total 6.6 6.4 - 8.2 g/dL    Albumin 3.1 (L) 3.5 - 5.0 g/dL    Globulin 3.5 2.0 - 4.0 g/dL    A-G Ratio 0.9 (L) 1.1 - 2.2          XR CHEST PORT   Final Result         Assessment:     Hospital Problems  Date Reviewed: 12/11/2020          Codes Class Noted POA    Rectal bleeding ICD-10-CM: K62.5  ICD-9-CM: 569.3  3/5/2021 Unknown        Abdominal pain ICD-10-CM: R10.9  ICD-9-CM: 789.00  3/5/2021 Unknown        Chronic constipation ICD-10-CM: K59.09  ICD-9-CM: 564.00  3/5/2021 Unknown        Constipation ICD-10-CM: K59.00  ICD-9-CM: 564.00  3/5/2021 Unknown            Abdominal pain and distention, possible SBO:    Chronic constipation:   Rectal bleeding:after his enema,  · CT scan of abdomen showed diverticulosis without diverticulitis  BUN and creatinine elevated since last admission, may be due to volume depetion    nobleeding overnight,            DVT Prophylaxis: SCDs    Plan:    hold Plavix and aspirin  IV fluids,  stop IV Protonix,since this is a lower gi bleeding   clear liquid diet,   IV morphine for pain  Monitor hemoglobin   may need a colonoscopy at some point once patient done  Better with abdominal  Pain, able to tolerate diet    Signed By: Rosa Martinez MD     March 6, 2021         Thank you for allowing me to participate in this patients care  Cc Referring Physician   Poncho Avina MD

## 2021-03-07 NOTE — PROGRESS NOTES
Hospitalist Progress Note    Subjective:   Daily Progress Note: 3/7/2021 3:15 PM    Hospital Course:     Gretchen Wood is a 77 y.o. male who Comes into the emergency room for 1 week abdominal pain, continued constipation at home then rectal bleeding overnight. PMH significant for MI, CAD, HTN, HLD and previous small bowel obstructions. Was seen last week in the emergency room for constipation/ileus requiring fleets enema, NG tube and discharged home. States she has not had a bowel movement since then. Patient takes Plavix and aspirin status post cardiac stent. Never get labs on admission hemoglobin 9.3 down from 11.6, BUN 27, creatinine 1.39 both elevated from previous numbers. CT of abdomen reveals diverticulosis without active diverticulitis, small bowel without obstruction. Will admit for further management of lower GI bleed. Consult GI.  IV fluids. Clear liquid diet. Transfused 2 units PRBCs for hemoglobin dropped to 6.9.  3/8/2021 scheduled for colonoscopy for further evaluation of lower GI bleed. Subjective: Follow-up examination of patient at bedside. He states he still has abdominal tenderness with movement. No additional bloody bowel movements today. Discussed plan of care at length, transfusions and colonoscopy in the morning.     Current Facility-Administered Medications   Medication Dose Route Frequency    0.9% sodium chloride infusion 250 mL  250 mL IntraVENous PRN    peg 3350-electrolytes (COLYTE) 4000 mL  4,000 mL Oral ONCE    atorvastatin (LIPITOR) tablet 40 mg  40 mg Oral DAILY    isosorbide mononitrate ER (IMDUR) tablet 60 mg  60 mg Oral 7am    traZODone (DESYREL) tablet 50 mg  50 mg Oral QHS PRN    sodium chloride (NS) flush 5-40 mL  5-40 mL IntraVENous Q8H    sodium chloride (NS) flush 5-40 mL  5-40 mL IntraVENous PRN    acetaminophen (TYLENOL) tablet 650 mg  650 mg Oral Q6H PRN    Or    acetaminophen (TYLENOL) suppository 650 mg  650 mg Rectal Q6H PRN    polyethylene glycol (MIRALAX) packet 17 g  17 g Oral DAILY PRN    bisacodyL (DULCOLAX) tablet 5 mg  5 mg Oral DAILY PRN    ondansetron (ZOFRAN ODT) tablet 4 mg  4 mg Oral Q6H PRN    Or    ondansetron (ZOFRAN) injection 4 mg  4 mg IntraVENous Q6H PRN    metoprolol (LOPRESSOR) injection 2.5 mg  2.5 mg IntraVENous Q6H    0.9% sodium chloride infusion  125 mL/hr IntraVENous CONTINUOUS    famotidine (PF) (PEPCID) 20 mg in 0.9% sodium chloride 10 mL injection  20 mg IntraVENous Q12H    ferrous sulfate tablet 325 mg  1 Tab Oral BID WITH MEALS        REVIEW OF SYSTEMS    Review of Systems   Constitutional: Positive for chills and malaise/fatigue. Negative for fever. HENT: Negative. Respiratory: Negative for cough and shortness of breath. Cardiovascular: Negative for chest pain. Gastrointestinal: Positive for abdominal pain and blood in stool. Neurological: Positive for weakness. Objective:     Visit Vitals  BP (!) 124/55 (BP 1 Location: Right upper arm, BP Patient Position: At rest)   Pulse 87   Temp 98.2 °F (36.8 °C)   Resp 18   Ht 5' 9\" (1.753 m)   Wt 115.3 kg (254 lb 3.1 oz)   SpO2 98%   BMI 37.54 kg/m²      O2 Device: Room air    Temp (24hrs), Av.3 °F (36.8 °C), Min:97.9 °F (36.6 °C), Max:98.8 °F (37.1 °C)      No intake/output data recorded.  1901 -  0700  In: -   Out: 300 [Urine:300]    PHYSICAL EXAM:    Physical Exam  Constitutional:       Appearance: He is obese. He is ill-appearing. Cardiovascular:      Rate and Rhythm: Tachycardia present. Pulmonary:      Effort: No respiratory distress. Abdominal:      General: There is distension. Tenderness: There is guarding. Musculoskeletal:      Right lower leg: No edema. Left lower leg: No edema. Neurological:      Mental Status: He is oriented to person, place, and time. Motor: Weakness present.           Data Review    Recent Results (from the past 24 hour(s))   CBC W/O DIFF    Collection Time: 21 12:25 PM   Result Value Ref Range    WBC 7.8 4.1 - 11.1 K/uL    RBC 2.94 (L) 4.10 - 5.70 M/uL    HGB 8.0 (L) 12.1 - 17.0 g/dL    HCT 26.1 (L) 36.6 - 50.3 %    MCV 88.8 80.0 - 99.0 FL    MCH 27.2 26.0 - 34.0 PG    MCHC 30.7 30.0 - 36.5 g/dL    RDW 14.6 (H) 11.5 - 14.5 %    PLATELET 271 150 - 400 K/uL    MPV 10.2 8.9 - 12.9 FL   C REACTIVE PROTEIN, QT    Collection Time: 03/06/21 12:25 PM   Result Value Ref Range    C-Reactive protein 0.82 (H) 0.00 - 0.60 mg/dL   PROCALCITONIN    Collection Time: 03/06/21 12:25 PM   Result Value Ref Range    Procalcitonin <0.05 (H) 0 ng/mL   METABOLIC PANEL, COMPREHENSIVE    Collection Time: 03/06/21 12:25 PM   Result Value Ref Range    Sodium 139 136 - 145 mmol/L    Potassium 3.7 3.5 - 5.1 mmol/L    Chloride 108 97 - 108 mmol/L    CO2 28 21 - 32 mmol/L    Anion gap 3 (L) 5 - 15 mmol/L    Glucose 115 (H) 65 - 100 mg/dL    BUN 21 (H) 6 - 20 mg/dL    Creatinine 1.10 0.70 - 1.30 mg/dL    BUN/Creatinine ratio 19 12 - 20      GFR est AA >60 >60 ml/min/1.73m2    GFR est non-AA >60 >60 ml/min/1.73m2    Calcium 8.7 8.5 - 10.1 mg/dL    Bilirubin, total 0.5 0.2 - 1.0 mg/dL    AST (SGOT) 23 15 - 37 U/L    ALT (SGPT) 34 12 - 78 U/L    Alk. phosphatase 61 45 - 117 U/L    Protein, total 6.6 6.4 - 8.2 g/dL    Albumin 3.1 (L) 3.5 - 5.0 g/dL    Globulin 3.5 2.0 - 4.0 g/dL    A-G Ratio 0.9 (L) 1.1 - 2.2     EKG, 12 LEAD, INITIAL    Collection Time: 03/06/21  9:47 PM   Result Value Ref Range    Ventricular Rate 90 BPM    Atrial Rate 90 BPM    P-R Interval 170 ms    QRS Duration 108 ms    Q-T Interval 366 ms    QTC Calculation (Bezet) 447 ms    Calculated P Axis 57 degrees    Calculated R Axis -6 degrees    Calculated T Axis 30 degrees    Diagnosis       Sinus rhythm with occasional Premature ventricular complexes  Incomplete right bundle branch block  Borderline ECG  No previous ECGs available  Confirmed by HELEN SETH MD [id number]) on 3/7/2021 10:14:10 AM     TROPONIN I    Collection Time: 03/07/21  1:24 AM   Result  Value Ref Range    Troponin-I, Qt. <0.05 <0.05 ng/mL   CBC WITH AUTOMATED DIFF    Collection Time: 03/07/21  1:24 AM   Result Value Ref Range    WBC 7.2 4.1 - 11.1 K/uL    RBC 2.51 (L) 4.10 - 5.70 M/uL    HGB 6.9 (L) 12.1 - 17.0 g/dL    HCT 22.1 (L) 36.6 - 50.3 %    MCV 88.0 80.0 - 99.0 FL    MCH 27.5 26.0 - 34.0 PG    MCHC 31.2 30.0 - 36.5 g/dL    RDW 14.5 11.5 - 14.5 %    PLATELET 914 458 - 687 K/uL    MPV 10.6 8.9 - 12.9 FL    NEUTROPHILS 58 32 - 75 %    LYMPHOCYTES 23 12 - 49 %    MONOCYTES 9 5 - 13 %    EOSINOPHILS 8 (H) 0 - 7 %    BASOPHILS 1 0 - 1 %    IMMATURE GRANULOCYTES 1 (H) 0.0 - 0.5 %    ABS. NEUTROPHILS 4.3 1.8 - 8.0 K/UL    ABS. LYMPHOCYTES 1.7 0.8 - 3.5 K/UL    ABS. MONOCYTES 0.7 0.0 - 1.0 K/UL    ABS. EOSINOPHILS 0.6 (H) 0.0 - 0.4 K/UL    ABS. BASOPHILS 0.0 0.0 - 0.1 K/UL    ABS. IMM. GRANS. 0.1 (H) 0.00 - 0.04 K/UL    DF AUTOMATED     METABOLIC PANEL, BASIC    Collection Time: 03/07/21  1:24 AM   Result Value Ref Range    Sodium 140 136 - 145 mmol/L    Potassium 3.8 3.5 - 5.1 mmol/L    Chloride 109 (H) 97 - 108 mmol/L    CO2 25 21 - 32 mmol/L    Anion gap 6 5 - 15 mmol/L    Glucose 89 65 - 100 mg/dL    BUN 13 6 - 20 mg/dL    Creatinine 0.87 0.70 - 1.30 mg/dL    BUN/Creatinine ratio 15 12 - 20      GFR est AA >60 >60 ml/min/1.73m2    GFR est non-AA >60 >60 ml/min/1.73m2    Calcium 7.8 (L) 8.5 - 10.1 mg/dL       XR CHEST PORT   Final Result          Active Problems:    Rectal bleeding (3/5/2021)      Abdominal pain (3/5/2021)      Chronic constipation (3/5/2021)      Constipation (3/5/2021)        Assessment/Plan:     1. Abdominal pain and distention:  2. Recent SBO:  3. Chronic constipation:  4.   Rectal bleeding:  · CT scan of abdomen did not show gas formation or obstruction, positive diverticulosis without diverticulitis  · We will hold Plavix and aspirin  · IV fluids, clear liquid diet, IV morphine for pain  · Consult GI-colonoscopy scheduled for tomorrow  · Consult general surgery  · Monitor hemoglobin     5. CAD, MI, stent placements:  · Continue metoprolol changed to IV  · Holding aspirin and Plavix for lower GI bleeding     6. PAPITO on CKD:  · BUN and creatinine elevated since last admission  · IV fluids  · Monitor labs         DVT Prophylaxis: SCDs  Code Status: Full Code  POA/NOK:  ______________________________________________________________________________  Time spent in direct care including coordination of service, review of data and examination: > 35 minutes  Care Plan discussed with: Patient, RN and consult  ______________________________________________________________________________    Jose Machado NP    This is dictation was done by dragon, computer voice recognition software. Quite often unanticipated grammatical, syntax, homophones and other interpretive errors or inadvertently transcribed by the computer software. Please excuse errors that have escaped final proofreading. Thank you.

## 2021-03-08 ENCOUNTER — ANESTHESIA (OUTPATIENT)
Dept: ENDOSCOPY | Age: 76
DRG: 393 | End: 2021-03-08
Payer: MEDICARE

## 2021-03-08 ENCOUNTER — APPOINTMENT (OUTPATIENT)
Dept: ENDOSCOPY | Age: 76
DRG: 393 | End: 2021-03-08
Attending: INTERNAL MEDICINE
Payer: MEDICARE

## 2021-03-08 ENCOUNTER — ANESTHESIA EVENT (OUTPATIENT)
Dept: ENDOSCOPY | Age: 76
DRG: 393 | End: 2021-03-08
Payer: MEDICARE

## 2021-03-08 LAB
ANION GAP SERPL CALC-SCNC: 4 MMOL/L (ref 5–15)
ATRIAL RATE: 86 BPM
BASOPHILS # BLD: 0 K/UL (ref 0–0.1)
BASOPHILS NFR BLD: 0 % (ref 0–1)
BUN SERPL-MCNC: 5 MG/DL (ref 6–20)
BUN/CREAT SERPL: 6 (ref 12–20)
CA-I BLD-MCNC: 8 MG/DL (ref 8.5–10.1)
CALCULATED P AXIS, ECG09: 53 DEGREES
CALCULATED R AXIS, ECG10: -6 DEGREES
CALCULATED T AXIS, ECG11: 46 DEGREES
CHLORIDE SERPL-SCNC: 112 MMOL/L (ref 97–108)
CO2 SERPL-SCNC: 25 MMOL/L (ref 21–32)
CREAT SERPL-MCNC: 0.84 MG/DL (ref 0.7–1.3)
DIAGNOSIS, 93000: NORMAL
DIFFERENTIAL METHOD BLD: ABNORMAL
EOSINOPHIL # BLD: 0.4 K/UL (ref 0–0.4)
EOSINOPHIL NFR BLD: 6 % (ref 0–7)
ERYTHROCYTE [DISTWIDTH] IN BLOOD BY AUTOMATED COUNT: 15 % (ref 11.5–14.5)
GLUCOSE SERPL-MCNC: 85 MG/DL (ref 65–100)
HCT VFR BLD AUTO: 27 % (ref 36.6–50.3)
HGB BLD-MCNC: 8.6 G/DL (ref 12.1–17)
IMM GRANULOCYTES # BLD AUTO: 0.1 K/UL (ref 0–0.04)
IMM GRANULOCYTES NFR BLD AUTO: 1 % (ref 0–0.5)
LYMPHOCYTES # BLD: 1.8 K/UL (ref 0.8–3.5)
LYMPHOCYTES NFR BLD: 26 % (ref 12–49)
MCH RBC QN AUTO: 28.3 PG (ref 26–34)
MCHC RBC AUTO-ENTMCNC: 31.9 G/DL (ref 30–36.5)
MCV RBC AUTO: 88.8 FL (ref 80–99)
MONOCYTES # BLD: 0.7 K/UL (ref 0–1)
MONOCYTES NFR BLD: 10 % (ref 5–13)
NEUTS SEG # BLD: 4.1 K/UL (ref 1.8–8)
NEUTS SEG NFR BLD: 57 % (ref 32–75)
NRBC # BLD: 0 K/UL (ref 0–0.01)
NRBC BLD-RTO: 0 PER 100 WBC
P-R INTERVAL, ECG05: 178 MS
PLATELET # BLD AUTO: 225 K/UL (ref 150–400)
PMV BLD AUTO: 10.6 FL (ref 8.9–12.9)
POTASSIUM SERPL-SCNC: 3.8 MMOL/L (ref 3.5–5.1)
Q-T INTERVAL, ECG07: 372 MS
QRS DURATION, ECG06: 108 MS
QTC CALCULATION (BEZET), ECG08: 445 MS
RBC # BLD AUTO: 3.04 M/UL (ref 4.1–5.7)
SODIUM SERPL-SCNC: 141 MMOL/L (ref 136–145)
TROPONIN I SERPL-MCNC: <0.05 NG/ML
VENTRICULAR RATE, ECG03: 86 BPM
WBC # BLD AUTO: 7.1 K/UL (ref 4.1–11.1)

## 2021-03-08 PROCEDURE — 80048 BASIC METABOLIC PNL TOTAL CA: CPT

## 2021-03-08 PROCEDURE — 30233N1 TRANSFUSION OF NONAUTOLOGOUS RED BLOOD CELLS INTO PERIPHERAL VEIN, PERCUTANEOUS APPROACH: ICD-10-PCS | Performed by: INTERNAL MEDICINE

## 2021-03-08 PROCEDURE — 2709999900 HC NON-CHARGEABLE SUPPLY: Performed by: INTERNAL MEDICINE

## 2021-03-08 PROCEDURE — 74011000250 HC RX REV CODE- 250: Performed by: NURSE ANESTHETIST, CERTIFIED REGISTERED

## 2021-03-08 PROCEDURE — 74011250636 HC RX REV CODE- 250/636: Performed by: NURSE PRACTITIONER

## 2021-03-08 PROCEDURE — 74011250637 HC RX REV CODE- 250/637: Performed by: NURSE PRACTITIONER

## 2021-03-08 PROCEDURE — 88305 TISSUE EXAM BY PATHOLOGIST: CPT

## 2021-03-08 PROCEDURE — 77030019988 HC FCPS ENDOSC DISP BSC -B: Performed by: INTERNAL MEDICINE

## 2021-03-08 PROCEDURE — 76060000032 HC ANESTHESIA 0.5 TO 1 HR: Performed by: INTERNAL MEDICINE

## 2021-03-08 PROCEDURE — 74011250637 HC RX REV CODE- 250/637: Performed by: INTERNAL MEDICINE

## 2021-03-08 PROCEDURE — 74011000250 HC RX REV CODE- 250: Performed by: NURSE PRACTITIONER

## 2021-03-08 PROCEDURE — 85025 COMPLETE CBC W/AUTO DIFF WBC: CPT

## 2021-03-08 PROCEDURE — 36415 COLL VENOUS BLD VENIPUNCTURE: CPT

## 2021-03-08 PROCEDURE — 84484 ASSAY OF TROPONIN QUANT: CPT

## 2021-03-08 PROCEDURE — 93005 ELECTROCARDIOGRAM TRACING: CPT

## 2021-03-08 PROCEDURE — 0DBN8ZX EXCISION OF SIGMOID COLON, VIA NATURAL OR ARTIFICIAL OPENING ENDOSCOPIC, DIAGNOSTIC: ICD-10-PCS | Performed by: INTERNAL MEDICINE

## 2021-03-08 PROCEDURE — 74011250636 HC RX REV CODE- 250/636: Performed by: NURSE ANESTHETIST, CERTIFIED REGISTERED

## 2021-03-08 PROCEDURE — P9016 RBC LEUKOCYTES REDUCED: HCPCS

## 2021-03-08 PROCEDURE — 36430 TRANSFUSION BLD/BLD COMPNT: CPT

## 2021-03-08 PROCEDURE — 76040000007: Performed by: INTERNAL MEDICINE

## 2021-03-08 PROCEDURE — 74011250636 HC RX REV CODE- 250/636: Performed by: INTERNAL MEDICINE

## 2021-03-08 PROCEDURE — 65270000029 HC RM PRIVATE

## 2021-03-08 RX ORDER — LIDOCAINE HYDROCHLORIDE 20 MG/ML
INJECTION, SOLUTION EPIDURAL; INFILTRATION; INTRACAUDAL; PERINEURAL AS NEEDED
Status: DISCONTINUED | OUTPATIENT
Start: 2021-03-08 | End: 2021-03-08 | Stop reason: HOSPADM

## 2021-03-08 RX ORDER — PROPOFOL 10 MG/ML
INJECTION, EMULSION INTRAVENOUS AS NEEDED
Status: DISCONTINUED | OUTPATIENT
Start: 2021-03-08 | End: 2021-03-08 | Stop reason: HOSPADM

## 2021-03-08 RX ORDER — SODIUM CHLORIDE, SODIUM LACTATE, POTASSIUM CHLORIDE, CALCIUM CHLORIDE 600; 310; 30; 20 MG/100ML; MG/100ML; MG/100ML; MG/100ML
INJECTION, SOLUTION INTRAVENOUS
Status: DISCONTINUED | OUTPATIENT
Start: 2021-03-08 | End: 2021-03-08 | Stop reason: HOSPADM

## 2021-03-08 RX ADMIN — PROPOFOL 50 MG: 10 INJECTION, EMULSION INTRAVENOUS at 13:03

## 2021-03-08 RX ADMIN — SODIUM CHLORIDE 125 ML/HR: 9 INJECTION, SOLUTION INTRAVENOUS at 04:23

## 2021-03-08 RX ADMIN — FENTANYL CITRATE 25 MCG: 50 INJECTION, SOLUTION INTRAMUSCULAR; INTRAVENOUS at 00:01

## 2021-03-08 RX ADMIN — MORPHINE SULFATE 2 MG: 2 INJECTION, SOLUTION INTRAMUSCULAR; INTRAVENOUS at 21:38

## 2021-03-08 RX ADMIN — NITROGLYCERIN 1 INCH: 20 OINTMENT TOPICAL at 03:00

## 2021-03-08 RX ADMIN — ATORVASTATIN CALCIUM 40 MG: 40 TABLET, FILM COATED ORAL at 09:06

## 2021-03-08 RX ADMIN — TRAZODONE HYDROCHLORIDE 50 MG: 50 TABLET ORAL at 23:51

## 2021-03-08 RX ADMIN — NITROGLYCERIN 1 INCH: 20 OINTMENT TOPICAL at 09:06

## 2021-03-08 RX ADMIN — ISOSORBIDE MONONITRATE 60 MG: 30 TABLET, EXTENDED RELEASE ORAL at 07:00

## 2021-03-08 RX ADMIN — PROPOFOL 50 MG: 10 INJECTION, EMULSION INTRAVENOUS at 13:04

## 2021-03-08 RX ADMIN — Medication 10 ML: at 06:36

## 2021-03-08 RX ADMIN — LIDOCAINE HYDROCHLORIDE 40 MG: 20 INJECTION, SOLUTION EPIDURAL; INFILTRATION; INTRACAUDAL; PERINEURAL at 13:02

## 2021-03-08 RX ADMIN — NITROGLYCERIN 1 INCH: 20 OINTMENT TOPICAL at 17:40

## 2021-03-08 RX ADMIN — METOPROLOL TARTRATE 2.5 MG: 5 INJECTION INTRAVENOUS at 17:40

## 2021-03-08 RX ADMIN — SODIUM CHLORIDE 125 ML/HR: 9 INJECTION, SOLUTION INTRAVENOUS at 21:45

## 2021-03-08 RX ADMIN — MORPHINE SULFATE 2 MG: 2 INJECTION, SOLUTION INTRAMUSCULAR; INTRAVENOUS at 23:44

## 2021-03-08 RX ADMIN — LIDOCAINE HYDROCHLORIDE 60 MG: 20 INJECTION, SOLUTION EPIDURAL; INFILTRATION; INTRACAUDAL; PERINEURAL at 13:01

## 2021-03-08 RX ADMIN — METOPROLOL TARTRATE 2.5 MG: 5 INJECTION INTRAVENOUS at 00:01

## 2021-03-08 RX ADMIN — Medication 10 ML: at 00:06

## 2021-03-08 RX ADMIN — SODIUM CHLORIDE, POTASSIUM CHLORIDE, SODIUM LACTATE AND CALCIUM CHLORIDE: 600; 310; 30; 20 INJECTION, SOLUTION INTRAVENOUS at 11:48

## 2021-03-08 RX ADMIN — FAMOTIDINE 20 MG: 10 INJECTION, SOLUTION INTRAVENOUS at 09:03

## 2021-03-08 RX ADMIN — MORPHINE SULFATE 2 MG: 2 INJECTION, SOLUTION INTRAMUSCULAR; INTRAVENOUS at 15:22

## 2021-03-08 RX ADMIN — METOPROLOL TARTRATE 2.5 MG: 5 INJECTION INTRAVENOUS at 06:24

## 2021-03-08 RX ADMIN — FAMOTIDINE 20 MG: 10 INJECTION, SOLUTION INTRAVENOUS at 21:38

## 2021-03-08 RX ADMIN — PROPOFOL 50 MG: 10 INJECTION, EMULSION INTRAVENOUS at 13:02

## 2021-03-08 RX ADMIN — Medication 10 ML: at 21:46

## 2021-03-08 RX ADMIN — PROPOFOL 50 MG: 10 INJECTION, EMULSION INTRAVENOUS at 13:01

## 2021-03-08 RX ADMIN — METOPROLOL TARTRATE 2.5 MG: 5 INJECTION INTRAVENOUS at 23:44

## 2021-03-08 NOTE — ANESTHESIA PREPROCEDURE EVALUATION
Relevant Problems   No relevant active problems       Anesthetic History   No history of anesthetic complications            Review of Systems / Medical History  Patient summary reviewed, nursing notes reviewed and pertinent labs reviewed    Pulmonary  Within defined limits                 Neuro/Psych   Within defined limits           Cardiovascular    Hypertension          Hyperlipidemia         GI/Hepatic/Renal                Endo/Other        Morbid obesity, arthritis and anemia     Other Findings              Physical Exam    Airway  Mallampati: III  TM Distance: 4 - 6 cm  Neck ROM: normal range of motion   Mouth opening: Normal     Cardiovascular    Rhythm: regular  Rate: normal         Dental  No notable dental hx       Pulmonary  Breath sounds clear to auscultation               Abdominal  GI exam deferred       Other Findings   Comments: Results for Dory Vega (MRN [id number]) as of 3/8/2021 12:25    3/8/2021 06:28  Sodium: 141  Potassium: 3.8  Chloride: 112 (H)  CO2: 25  Anion gap: 4 (L)  Glucose: 85  BUN: 5 (L)  Creatinine: 0.84  BUN/Creatinine ratio: 6 (L)  Calcium: 8.0 (L)  GFR est non-AA: >60  GFR est AA: >60  Troponin-I, Qt.: <0.05    Results for Dory Vega (MRN [id number]) as of 3/8/2021 12:25    3/8/2021 06:28  WBC: 7.1  NRBC: 0.0  RBC: 3.04 (L)  HGB: 8.6 (L)  HCT: 27.0 (L)  MCV: 88.8  MCH: 28.3  MCHC: 31.9  RDW: 15.0 (H)  PLATELET: 523  MPV: 05.0  NEUTROPHILS: 57  LYMPHOCYTES: 26  MONOCYTES: 10  EOSINOPHILS: 6  BASOPHILS: 0  IMMATURE GRANULOCYTES: 1 (H)  DF: AUTOMATED  ABSOLUTE NRBC: 0.00  ABS. NEUTROPHILS: 4.1  ABS. IMM. GRANS.: 0.1 (H)  ABS. LYMPHOCYTES: 1.8  ABS. MONOCYTES: 0.7  ABS. EOSINOPHILS: 0.4  ABS.  BASOPHILS: 0.0         Anesthetic Plan    ASA: 3  Anesthesia type: total IV anesthesia and general          Induction: Intravenous  Anesthetic plan and risks discussed with: Patient      General anesthesia was prescribed for this patient because by definition it is \"a drug-induced loss of consciousness during which patients are not arousable, even by painful stimulation. \" Sometimes, the ability to independently maintain ventilatory function is often impaired and patients often require assistance in maintaining a patent airway. Occasionally, positive pressure ventilation may be required because of depressed spontaneous ventilation or drug-induced depression of neuromuscular function. This depth of anesthesia is preferred for endoscopic procedures to facilitate the procedure and for patient safety/quality of care.

## 2021-03-08 NOTE — PROGRESS NOTES
Chart reviewed. Patient is scheduled to have colonoscopy today. DCP is home self care. CM will continue to follow patient for discharge planning needs.

## 2021-03-08 NOTE — PROGRESS NOTES
Laboratory phlebotomist notified me that she was unable to draw patient's blood after attempting twice. She informed me that patient is hard stick and next phlebotomist that comes in at 0500 will attempt to draw blood.

## 2021-03-08 NOTE — PROGRESS NOTES
[id number] [facility] SURGERY CONSULT          Chief Complaint: rectal bleed x 3 days    History of Present Illness:    Mr. Jersey Bailey is a 77y.o. year old * male admitted with 2 day history of painless rectal bleed for the past 3 days. No nausea or vomiting. He has been on Plavix. Never had a colonoscopy. Past Medical History:   Past Medical History:   Diagnosis Date    Arthritis     Bowel obstruction ([address]     High cholesterol     Hx of abdominal surgery     for SBO    Hypertension        Past Surgical History: Exploratory Laparotomy for bowel obstruction. Allergy:No Known Allergies    Social History:  reports that he quit smoking about 2 months ago. He has a 0.25 pack-year smoking history. He has never used smokeless tobacco. He reports that he does not drink alcohol or use drugs.      Family History:  Family History   Problem Relation Age of Onset    Hypertension Mother     No Known Problems Father     Hypertension Other         Current Medications:  Current Facility-Administered Medications:     0.9% sodium chloride infusion 250 mL, 250 mL, IntraVENous, PRN, Eva Campbell, NP    morphine injection 2 mg, 2 mg, IntraVENous, Q2H PRN, Dianna Culver MD, 2 mg at 03/07/21 1921    nitroglycerin (NITROBID) 2 % ointment 1 Inch, 1 Inch, Topical, Q6H, Susan Tellez MD    atorvastatin (LIPITOR) tablet 40 mg, 40 mg, Oral, DAILY, Janalee Shannan, NP, 40 mg at 03/07/21 1260    isosorbide mononitrate ER (IMDUR) tablet 60 mg, 60 mg, Oral, 7am, Eva Campbell, NP, 60 mg at 03/07/21 9864    traZODone (DESYREL) tablet 50 mg, 50 mg, Oral, QHS PRN, Janalee Shannan, NP, 50 mg at 03/06/21 2341    sodium chloride (NS) flush 5-40 mL, 5-40 mL, IntraVENous, Q8H, Janalee Shannan, NP, 10 mL at 03/07/21 1400    sodium chloride (NS) flush 5-40 mL, 5-40 mL, IntraVENous, PRN, Emeterioalee [place], NP  [employer]  acetaminophen (TYLENOL) tablet 650 mg, 650 mg, Oral, Q6H PRN **OR** acetaminophen (TYLENOL) suppository 650 mg, 650 mg, Rectal, Q6H PRN, William Nip, NP    polyethylene glycol (MIRALAX) packet 17 g, 17 g, Oral, DAILY PRN, William Nip, NP    bisacodyL (DULCOLAX) tablet 5 mg, 5 mg, Oral, DAILY PRN, William Nip, NP    ondansetron (ZOFRAN ODT) tablet 4 mg, 4 mg, Oral, Q6H PRN **OR** ondansetron (ZOFRAN) injection 4 mg, 4 mg, IntraVENous, Q6H PRN, William Nip, NP, 4 mg at 03/06/21 2341    metoprolol (LOPRESSOR) injection 2.5 mg, 2.5 mg, IntraVENous, Q6H, William Nip, NP, 2.5 mg at 03/07/21 1719    0.9% sodium chloride infusion, 125 mL/hr, IntraVENous, CONTINUOUS, William Nip, NP, Last Rate: 125 mL/hr at 03/07/21 0946, 125 mL/hr at 03/07/21 0946    famotidine (PF) (PEPCID) 20 mg in 0.9% sodium chloride 10 mL injection, 20 mg, IntraVENous, Q12H, William Nip, NP, 20 mg at 03/06/21 1159    ferrous sulfate tablet 325 mg, 1 Tab, Oral, BID WITH MEALS, Rosholt Done Vero Raymundo MD, 325 mg at 03/07/21 1718     Immunization History: There is no immunization history on file for this patient. Complete    Review of Systems:     Constitutional:  no fever,  no chills,  no sweats, No weakness, No fatigue, No decreased activity. Eye: No recent visual problem, No icterus, No discharge, No double vision. Ear/Nose/Mouth/Throat: No decreased hearing, No ear pain, No nasal congestion, No sore throat. Respiratory: No shortness of breath, No cough, No sputum production, No hemoptysis, No wheezing, No cyanosis. Cardiovascular: No chest pain, No palpitations, No bradycardia, No tachycardia, No peripheral edema, No syncope. Gastrointestinal: No nausea,  No vomiting, No diarrhea, No constipation, No heartburn,  No abdominal pain, rectal bleeding. .  Genitourinary: No dysuria, No hematuria, No change in urine stream, No urethral discharge, No lesions. Hematology/Lymphatics: No bruising tendency, No bleeding tendency, No petechiae, No swollen lymph glands.   Endocrine: No excessive thirst, No polyuria, No cold intolerance, No heat intolerance, No excessive hunger. Immunologic: Not immunocompromised, No recurrent fevers, No recurrent infections. Musculoskeletal: No back pain, No neck pain, No joint pain, No muscle pain, No claudication, No decreased range of motion, No trauma. Integumentary: No rash, No pruritus, No abrasions. Neurologic: Alert and oriented X4, No abnormal balance, No headache, No confusion, No numbness, No tingling. Psychiatric: No anxiety, No depression, No winnie. Physical Exam:     Vitals & Measurements: Wt Readings from Last 3 Encounters:   03/07/21 115.3 kg (254 lb 3.1 oz)   03/05/21 117 kg (258 lb)   02/24/21 117 kg (258 lb)     Temp Readings from Last 3 Encounters:   03/07/21 98.3 °F (36.8 °C)   03/05/21 98.3 °F (36.8 °C)   03/01/21 98.3 °F (36.8 °C)     BP Readings from Last 3 Encounters:   03/07/21 (!) 148/77   03/05/21 132/79   03/01/21 120/72     Pulse Readings from Last 3 Encounters:   03/07/21 94   03/05/21 94   03/01/21 97      Ht Readings from Last 3 Encounters:   03/05/21 5' 9\" (1.753 m)   03/05/21 5' 9\" (1.753 m)   02/24/21 5' 9\" (1.753 m)          General: well appearing, no acute distress  Head: Normal  Face: Nornal  HEENT: atraumatic, PERRLA, moist mucosa, normal pharynx, normal tonsils and adenoids, normal tongue, no fluid in sinuses  Neck: Trachea midline, no carotid bruit, no masses  Chest: Normal.  Respiratory: Normal chest wall expansion, CTA B, no r/r/w, no rubs  Cardiovascular: RRR, no m/r/g, Normal S1 and S2  Abdomen: Soft, non tender, non-distended, normal bowel sounds in all quadrants, no hepatosplenomegaly, no tympany. Incision scar: Long midline scar. Genitourinary: No inguinal hernia, normal external gentalia, Testis & scrotum normal, no renal angle tenderness  Rectal: Some blood. Musculoskeletal: normal ROM in upper and lower extremities, No joint swelling.   Integumentary: Warm, dry, and pink, with no rash, purpura, or petechia  Heme/Lymph: No lymphadenopathy, no bruises  Neurological:Cranial Nerves II-XII grossly intact, no gross motor or sensory deficit  Psychiatric: Cooperative with normal mood, affect, and cognition      Laboratory Values:   Recent Results (from the past 24 hour(s))   EKG, 12 LEAD, INITIAL    Collection Time: 03/06/21  9:47 PM   Result Value Ref Range    Ventricular Rate 90 BPM    Atrial Rate 90 BPM    P-R Interval 170 ms    QRS Duration 108 ms    Q-T Interval 366 ms    QTC Calculation (Bezet) 447 ms    Calculated P Axis 57 degrees    Calculated R Axis -6 degrees    Calculated T Axis 30 degrees    Diagnosis       Sinus rhythm with occasional Premature ventricular complexes  Incomplete right bundle branch block  Borderline ECG  No previous ECGs available  Confirmed by Isaias Pizarro MD, [facility] ([id number]) on 3/7/2021 10:14:10 AM     TROPONIN I    Collection Time: 03/07/21  1:24 AM   Result Value Ref Range    Troponin-I, Qt. <0.05 <0.05 ng/mL   CBC WITH AUTOMATED DIFF    Collection Time: 03/07/21  1:24 AM   Result Value Ref Range    WBC 7.2 4.1 - 11.1 K/uL    RBC 2.51 (L) 4.10 - 5.70 M/uL    HGB 6.9 (L) 12.1 - 17.0 g/dL    HCT 22.1 (L) 36.6 - 50.3 %    MCV 88.0 80.0 - 99.0 FL    MCH 27.5 26.0 - 34.0 PG    MCHC 31.2 30.0 - 36.5 g/dL    RDW 14.5 11.5 - 14.5 %    PLATELET 656 148 - 463 K/uL    MPV 10.6 8.9 - 12.9 FL    NEUTROPHILS 58 32 - 75 %    LYMPHOCYTES 23 12 - 49 %    MONOCYTES 9 5 - 13 %    EOSINOPHILS 8 (H) 0 - 7 %    BASOPHILS 1 0 - 1 %    IMMATURE GRANULOCYTES 1 (H) 0.0 - 0.5 %    ABS. NEUTROPHILS 4.3 1.8 - 8.0 K/UL    ABS. LYMPHOCYTES 1.7 0.8 - 3.5 K/UL    ABS. MONOCYTES 0.7 0.0 - 1.0 K/UL    ABS. EOSINOPHILS 0.6 (H) 0.0 - 0.4 K/UL    ABS. BASOPHILS 0.0 0.0 - 0.1 K/UL    ABS. IMM.  GRANS. 0.1 (H) 0.00 - 0.04 K/UL    DF AUTOMATED     METABOLIC PANEL, BASIC    Collection Time: 03/07/21  1:24 AM   Result Value Ref Range    Sodium 140 136 - 145 mmol/L    Potassium 3.8 3.5 - 5.1 mmol/L    Chloride 109 (H) 97 - 108 mmol/L    CO2 25 21 - 32 mmol/L    Anion gap 6 5 - 15 mmol/L    Glucose 89 65 - 100 mg/dL    BUN 13 6 - 20 mg/dL    Creatinine 0.87 0.70 - 1.30 mg/dL    BUN/Creatinine ratio 15 12 - 20      GFR est AA >60 >60 ml/min/1.73m2    GFR est non-AA >60 >60 ml/min/1.73m2    Calcium 7.8 (L) 8.5 - 10.1 mg/dL       Assessment:  Problem List Items Addressed This Visit     None      Visit Diagnoses     Gastrointestinal hemorrhage, unspecified gastrointestinal hemorrhage type    -  Primary        Most likely Diverticular or Hemorrhoidal      Hemoglobin dropped. .    Plan:    1. Admission  2. Diet NPO. 3. SCD  4. IS  5. Pain medications  6. Nausea medication  7. Labs: H&H Q8 hours. 8. pRBC to be transfused  9. Colonoscopy in am.    10. Consult: GI consultation for Colonoscopy       Thank you for the consultation & allowing me to participate in the care of this patient.

## 2021-03-08 NOTE — PROGRESS NOTES
Notified Dr. Page Real of patient's EKG results.  ordered troponin stat and in morning. Patient's stat order wasn't done since patient due to receive another unit of blood.  also ordered Fentanyl 25mcg IV once for pain.

## 2021-03-08 NOTE — ROUTINE PROCESS
Bedside shift change report given to Aminata Arango RN (oncoming nurse) by Jayna Delgado RN (offgoing nurse). Report included the following information SBAR, Kardex, and MAR.

## 2021-03-08 NOTE — PROGRESS NOTES
Hospitalist Progress Note    Subjective:   Daily Progress Note: 3/8/2021 3:15 PM    Hospital Course:     Juani Nava is a 77 y.o. male who Comes into the emergency room for 1 week abdominal pain, continued constipation at home then rectal bleeding overnight. PMH significant for MI, CAD, HTN, HLD and previous small bowel obstructions. Was seen last week in the emergency room for constipation/ileus requiring fleets enema, NG tube and discharged home. States she has not had a bowel movement since then. Patient takes Plavix and aspirin status post cardiac stent. Never get labs on admission hemoglobin 9.3 down from 11.6, BUN 27, creatinine 1.39 both elevated from previous numbers. CT of abdomen reveals diverticulosis without active diverticulitis, small bowel without obstruction. Will admit for further management of lower GI bleed. Consult GI.  IV fluids. Clear liquid diet. Transfused 2 units PRBCs for hemoglobin dropped to 6.9.  3/8/2021 scheduled for colonoscopy for further evaluation of lower GI bleed. Subjective: Follow-up examination of patient at bedside. Tolerated bowel prep overnight.     Current Facility-Administered Medications   Medication Dose Route Frequency    0.9% sodium chloride infusion 250 mL  250 mL IntraVENous PRN    morphine injection 2 mg  2 mg IntraVENous Q2H PRN    nitroglycerin (NITROBID) 2 % ointment 1 Inch  1 Inch Topical Q6H    atorvastatin (LIPITOR) tablet 40 mg  40 mg Oral DAILY    isosorbide mononitrate ER (IMDUR) tablet 60 mg  60 mg Oral 7am    traZODone (DESYREL) tablet 50 mg  50 mg Oral QHS PRN    sodium chloride (NS) flush 5-40 mL  5-40 mL IntraVENous Q8H    sodium chloride (NS) flush 5-40 mL  5-40 mL IntraVENous PRN    acetaminophen (TYLENOL) tablet 650 mg  650 mg Oral Q6H PRN    Or    acetaminophen (TYLENOL) suppository 650 mg  650 mg Rectal Q6H PRN    polyethylene glycol (MIRALAX) packet 17 g  17 g Oral DAILY PRN    bisacodyL (DULCOLAX) tablet 5 mg  5 mg Oral DAILY PRN    ondansetron (ZOFRAN ODT) tablet 4 mg  4 mg Oral Q6H PRN    Or    ondansetron (ZOFRAN) injection 4 mg  4 mg IntraVENous Q6H PRN    metoprolol (LOPRESSOR) injection 2.5 mg  2.5 mg IntraVENous Q6H    0.9% sodium chloride infusion  125 mL/hr IntraVENous CONTINUOUS    famotidine (PF) (PEPCID) 20 mg in 0.9% sodium chloride 10 mL injection  20 mg IntraVENous Q12H    ferrous sulfate tablet 325 mg  1 Tab Oral BID WITH MEALS        REVIEW OF SYSTEMS    Review of Systems   Constitutional: Positive for chills and malaise/fatigue. Negative for fever. HENT: Negative. Respiratory: Negative for cough and shortness of breath. Cardiovascular: Negative for chest pain. Gastrointestinal: Positive for abdominal pain and blood in stool. Neurological: Positive for weakness. Objective:     Visit Vitals  BP (!) 140/94 (BP 1 Location: Right upper arm, BP Patient Position: At rest;Supine)   Pulse 87   Temp 98.1 °F (36.7 °C)   Resp 18   Ht 5' 9\" (1.753 m)   Wt 118.4 kg (261 lb 0.4 oz)   SpO2 96%   BMI 38.55 kg/m²      O2 Device: Room air    Temp (24hrs), Av.2 °F (36.8 °C), Min:97.3 °F (36.3 °C), Max:99.1 °F (37.3 °C)      No intake/output data recorded.  1901 -  0700  In: 608.7   Out: -     PHYSICAL EXAM:    Physical Exam  Constitutional:       Appearance: He is obese. He is ill-appearing. Cardiovascular:      Rate and Rhythm: Tachycardia present. Pulmonary:      Effort: No respiratory distress. Abdominal:      General: There is distension. Tenderness: There is guarding. Musculoskeletal:      Right lower leg: No edema. Left lower leg: No edema. Neurological:      Mental Status: He is oriented to person, place, and time. Motor: Weakness present.           Data Review    Recent Results (from the past 24 hour(s))   CBC WITH AUTOMATED DIFF    Collection Time: 21  6:28 AM   Result Value Ref Range    WBC 7.1 4.1 - 11.1 K/uL    RBC 3.04 (L) 4.10 - 5.70 M/uL    HGB 8.6 (L) 12.1 - 17.0 g/dL    HCT 27.0 (L) 36.6 - 50.3 %    MCV 88.8 80.0 - 99.0 FL    MCH 28.3 26.0 - 34.0 PG    MCHC 31.9 30.0 - 36.5 g/dL    RDW 15.0 (H) 11.5 - 14.5 %    PLATELET 243 617 - 695 K/uL    MPV 10.6 8.9 - 12.9 FL    NRBC 0.0 0  WBC    ABSOLUTE NRBC 0.00 0.00 - 0.01 K/uL    NEUTROPHILS 57 32 - 75 %    LYMPHOCYTES 26 12 - 49 %    MONOCYTES 10 5 - 13 %    EOSINOPHILS 6 0 - 7 %    BASOPHILS 0 0 - 1 %    IMMATURE GRANULOCYTES 1 (H) 0.0 - 0.5 %    ABS. NEUTROPHILS 4.1 1.8 - 8.0 K/UL    ABS. LYMPHOCYTES 1.8 0.8 - 3.5 K/UL    ABS. MONOCYTES 0.7 0.0 - 1.0 K/UL    ABS. EOSINOPHILS 0.4 0.0 - 0.4 K/UL    ABS. BASOPHILS 0.0 0.0 - 0.1 K/UL    ABS. IMM.  GRANS. 0.1 (H) 0.00 - 0.04 K/UL    DF AUTOMATED     METABOLIC PANEL, BASIC    Collection Time: 03/08/21  6:28 AM   Result Value Ref Range    Sodium 141 136 - 145 mmol/L    Potassium 3.8 3.5 - 5.1 mmol/L    Chloride 112 (H) 97 - 108 mmol/L    CO2 25 21 - 32 mmol/L    Anion gap 4 (L) 5 - 15 mmol/L    Glucose 85 65 - 100 mg/dL    BUN 5 (L) 6 - 20 mg/dL    Creatinine 0.84 0.70 - 1.30 mg/dL    BUN/Creatinine ratio 6 (L) 12 - 20      GFR est AA >60 >60 ml/min/1.73m2    GFR est non-AA >60 >60 ml/min/1.73m2    Calcium 8.0 (L) 8.5 - 10.1 mg/dL   TROPONIN I    Collection Time: 03/08/21  6:28 AM   Result Value Ref Range    Troponin-I, Qt. <0.05 <0.05 ng/mL   EKG, 12 LEAD, SUBSEQUENT    Collection Time: 03/08/21  8:15 AM   Result Value Ref Range    Ventricular Rate 86 BPM    Atrial Rate 86 BPM    P-R Interval 178 ms    QRS Duration 108 ms    Q-T Interval 372 ms    QTC Calculation (Bezet) 445 ms    Calculated P Axis 53 degrees    Calculated R Axis -6 degrees    Calculated T Axis 46 degrees    Diagnosis       Normal sinus rhythm  Incomplete right bundle branch block  Borderline ECG  When compared with ECG of 06-MAR-2021 21:47,  Premature ventricular complexes are no longer Present  Confirmed by Radha Landry MD, [facility] ([id number]) on 3/8/2021 9:43:23 AM         XR CHEST PORT   Final Result          Principal Problem:    Rectal bleeding (3/5/2021)    Active Problems:    Abdominal pain (3/5/2021)      Chronic constipation (3/5/2021)      Constipation (3/5/2021)        Assessment/Plan:     1. Abdominal pain and distention:  2. Recent SBO:  3. Chronic constipation:  4. Rectal bleeding:  · CT scan of abdomen did not show gas formation or obstruction, positive diverticulosis without diverticulitis  · We will hold Plavix and aspirin  · IV fluids, clear liquid diet, IV morphine for pain  · Consult GI  · colonoscopy scheduled for today consult general surgery  · Monitor hemoglobin  · Transfuse 2 units packed red blood cells for hemoglobin drop to 6.9  · Hemoglobin today stable at 8.6     5. CAD, MI, stent placements:  · Continue metoprolol changed to IV  · Holding aspirin and Plavix for lower GI bleeding     6. PAPITO on CKD:  · BUN and creatinine elevated since last admission  · IV fluids  · Monitor labs         DVT Prophylaxis: SCDs  Code Status: Full Code  POA/NOK:  ______________________________________________________________________________  Time spent in direct care including coordination of service, review of data and examination: > 35 minutes  Care Plan discussed with: Patient, RN and consult  ______________________________________________________________________________    Shahzad Strong NP    This is dictation was done by dragon, computer voice recognition software. Quite often unanticipated grammatical, syntax, homophones and other interpretive errors or inadvertently transcribed by the computer software. Please excuse errors that have escaped final proofreading. Thank you.

## 2021-03-08 NOTE — PROGRESS NOTES
Notified on-call cardiologist, Dr. Lisa Ramirez, about patient's chest pain. Dr. Lisa Ramirez ordered an EKG and nitroglycerin oint, 1 inch to chest wall. Notify Dr of EKG results. Respiratory was also notified to complete EKG.

## 2021-03-08 NOTE — CONSULTS
Consult    NAME: Susana Cochran   :  1945   MRN:  [id number]     Date/Time:  3/8/2021 10:28 AM    Patient PCP: Anne Lee MD  ________________________________________________________________________    This is an inpatient cardiology consultation requested by Stephen Charles for chest pain and discontinuation of DAPT due to GI bleed. Assessment:     1. Atypical chest pain in setting of known coronary artery disease with two-vessel CABG in  and PTCA/BRAYDEN of circumflex OM1 in . Patient currently is free of chest pain post optimization of antianginal regimen with combination of nitrates and beta-blockers. 2. Lower GI bleed with significant drop in hemoglobin. Patient now is status post PRBC transfusion and is preop for colonoscopy. Plan:     1. Since patient's PTCA/BRAYDEN of circumflex OM1 was done 3 years ago, aspirin and Plavix can be held due to patient's lower GI bleed as required by gastroenterologist.  2. Continue combination of nitrates and beta-blocker for antianginal coverage. 3. Patient's case discussed with his primary cardiologist Dr. Isabela Mariano who will see patient from here onwards. []        High complexity decision making was performed        Subjective:   CHIEF COMPLAINT: Chest pain and the use of antiplatelet therapy in setting of rectal bleed    HISTORY OF PRESENT ILLNESS:     This is a 80-year-old man with a history of coronary artery disease, S/P 2V-CABG, , status post PTCA/BRAYDEN of LCxOM1,, status post myocardial infarction, hypertension and hyperlipidemia who presented to emergency room with complaints of chronic constipation, abdominal pain and rectal bleed. Patient hemoglobin dropped to 6.9 requiring multiple units of PRBC transfusion. Patient aspirin and Plavix which earlier were used for coronary stenting were put on hold.   Patient during the course of hospitalization also complained of moderate mid substernal achy chest pain which improved after application of nitroglycerin patch. Patient's  EKG showed sinus rhythm with incomplete right bundle branch block and patient while in hospital has ruled out for myocardial infarction. Patient at the time of my evaluation was free of chest pain resting comfortably in bed. Past Medical History:   Diagnosis Date    Arthritis     Bowel obstruction ([address]     High cholesterol     Hx of abdominal surgery     for SBO    Hypertension       No past surgical history on file. No Known Allergies   Meds:  See below  Social History     Tobacco Use    Smoking status: Former Smoker     Packs/day: 0.50     Years: 0.50     Pack years: 0.25     Quit date: 2020     Years since quittin.2    Smokeless tobacco: Never Used   Substance Use Topics    Alcohol use: Never     Frequency: Never      Family History   Problem Relation Age of Onset    Hypertension Mother     No Known Problems Father     Hypertension Other        REVIEW OF SYSTEMS:     []         Unable to obtain  ROS due to ---   [x]         Total of 12 systems reviewed as follows:    Constitutional: negative fever, negative chills, negative weight loss  Eyes:   negative visual changes  ENT:   negative sore throat, tongue or lip swelling  Respiratory:  negative cough, negative dyspnea  Cards: As per history of present illness  GI:   As per history of present illness  Genitourinary: negative for frequency, dysuria  Integument:  negative for rash   Hematologic:  negative for easy bruising and gum/nose bleeding  Musculoskel: negative for myalgias,  back pain  Neurological:  negative for headaches, dizziness, vertigo, weakness  Behavl/Psych: negative for feelings of anxiety, depression     Pertinent Positives include :    Objective:      Physical Exam:    Last 24hrs VS reviewed since prior progress note.  Most recent are:    Visit Vitals  BP (!) 140/94 (BP 1 Location: Right upper arm, BP Patient Position: At rest;Supine)   Pulse 87   Temp 98.1 °F (36.7 °C) Resp 18   Ht 5' 9\" (1.753 m)   Wt 118.4 kg (261 lb 0.4 oz)   SpO2 96%   BMI 38.55 kg/m²       Intake/Output Summary (Last 24 hours) at 3/8/2021 1028  Last data filed at 3/8/2021 7006  Gross per 24 hour   Intake 608.7 ml   Output --   Net 608.7 ml        General Appearance: Well developed, well nourished, alert & oriented x 3,    no acute distress. Ears/Nose/Mouth/Throat: Pupils equal and round, Hearing grossly normal.  Neck: Supple. JVP within normal limits. Carotids good upstrokes, with no bruit. Chest: Lungs clear to auscultation bilaterally. Cardiovascular: Regular rate and rhythm, S1S2 normal, no murmur, rubs, gallops. Abdomen: Soft, non-tender, bowel sounds are active. No organomegaly. Extremities: No edema bilaterally. Femoral pulses +2, Distal Pulses +1. Skin: Warm and dry. Neuro: CN II-XII grossly intact, Strength and sensation grossly intact. []         Post-cath site without hematoma, bruit, tenderness, or thrill. Distal pulses intact. Data:      Telemetry:    EKG:  []  No new EKG for review. XR CHEST PORT   Final Result             Prior to Admission medications    Medication Sig Start Date End Date Taking? Authorizing Provider   atorvastatin (LIPITOR) 40 mg tablet Take  by mouth daily. Yes Provider, Historical   famotidine (PEPCID) 20 mg tablet Take 1 Tab by mouth nightly. 3/1/21   Juan Carlos Mancia PA-C   traZODone (DESYREL) 50 mg tablet Take 50 mg by mouth nightly as needed for Insomnia. 2/18/21   Provider, Historical   clopidogreL (PLAVIX) 75 mg tab Take 75 mg by mouth daily. 2/6/21   Provider, Historical   nitroglycerin (NITROLINGUAL) 400 mcg/spray spray 1 Spray by SubLINGual route every five (5) minutes as needed for Chest Pain. Provider, Historical   furosemide (LASIX) 40 mg tablet Take  by mouth daily. Provider, Historical   naproxen (NAPROSYN) 500 mg tablet Take 500 mg by mouth two (2) times daily (with meals).     Provider, Historical   olmesartan (BENICAR) 20 mg tablet Take 20 mg by mouth daily. Provider, Historical   metoprolol tartrate (LOPRESSOR) 100 mg IR tablet Take  by mouth two (2) times a day. Provider, Historical   isosorbide mononitrate ER (IMDUR) 60 mg CR tablet Take  by mouth every morning. Provider, Historical       Recent Results (from the past 24 hour(s))   CBC WITH AUTOMATED DIFF    Collection Time: 03/08/21  6:28 AM   Result Value Ref Range    WBC 7.1 4.1 - 11.1 K/uL    RBC 3.04 (L) 4.10 - 5.70 M/uL    HGB 8.6 (L) 12.1 - 17.0 g/dL    HCT 27.0 (L) 36.6 - 50.3 %    MCV 88.8 80.0 - 99.0 FL    MCH 28.3 26.0 - 34.0 PG    MCHC 31.9 30.0 - 36.5 g/dL    RDW 15.0 (H) 11.5 - 14.5 %    PLATELET 835 698 - 666 K/uL    MPV 10.6 8.9 - 12.9 FL    NRBC 0.0 0  WBC    ABSOLUTE NRBC 0.00 0.00 - 0.01 K/uL    NEUTROPHILS 57 32 - 75 %    LYMPHOCYTES 26 12 - 49 %    MONOCYTES 10 5 - 13 %    EOSINOPHILS 6 0 - 7 %    BASOPHILS 0 0 - 1 %    IMMATURE GRANULOCYTES 1 (H) 0.0 - 0.5 %    ABS. NEUTROPHILS 4.1 1.8 - 8.0 K/UL    ABS. LYMPHOCYTES 1.8 0.8 - 3.5 K/UL    ABS. MONOCYTES 0.7 0.0 - 1.0 K/UL    ABS. EOSINOPHILS 0.4 0.0 - 0.4 K/UL    ABS. BASOPHILS 0.0 0.0 - 0.1 K/UL    ABS. IMM.  GRANS. 0.1 (H) 0.00 - 0.04 K/UL    DF AUTOMATED     METABOLIC PANEL, BASIC    Collection Time: 03/08/21  6:28 AM   Result Value Ref Range    Sodium 141 136 - 145 mmol/L    Potassium 3.8 3.5 - 5.1 mmol/L    Chloride 112 (H) 97 - 108 mmol/L    CO2 25 21 - 32 mmol/L    Anion gap 4 (L) 5 - 15 mmol/L    Glucose 85 65 - 100 mg/dL    BUN 5 (L) 6 - 20 mg/dL    Creatinine 0.84 0.70 - 1.30 mg/dL    BUN/Creatinine ratio 6 (L) 12 - 20      GFR est AA >60 >60 ml/min/1.73m2    GFR est non-AA >60 >60 ml/min/1.73m2    Calcium 8.0 (L) 8.5 - 10.1 mg/dL   TROPONIN I    Collection Time: 03/08/21  6:28 AM   Result Value Ref Range    Troponin-I, Qt. <0.05 <0.05 ng/mL   EKG, 12 LEAD, SUBSEQUENT    Collection Time: 03/08/21  8:15 AM   Result Value Ref Range    Ventricular Rate 86 BPM    Atrial Rate 86 BPM    P-R Interval 178 ms    QRS Duration 108 ms    Q-T Interval 372 ms    QTC Calculation (Bezet) 445 ms    Calculated P Axis 53 degrees    Calculated R Axis -6 degrees    Calculated T Axis 46 degrees    Diagnosis       Normal sinus rhythm  Incomplete right bundle branch block  Borderline ECG  When compared with ECG of 06-MAR-2021 21:47,  Premature ventricular complexes are no longer Present  Confirmed by Fanta Antonio MD, [facility] ([id number]) on 3/8/2021 9:43:23 AM          Echo Results  (Last 48 hours)    None          Patient's  EKG and laboratory data were individually reviewed by me. Please send a copy of this dictation to above referring physician. Moody Frazier MD    This dictation was done by Dragon computer voice recognition software. Occasionally grammatical, syntax and other interpretive errors escape final proof reading. Please feel free to call me for any clarification.

## 2021-03-09 VITALS
HEIGHT: 69 IN | OXYGEN SATURATION: 97 % | DIASTOLIC BLOOD PRESSURE: 54 MMHG | BODY MASS INDEX: 39.15 KG/M2 | HEART RATE: 84 BPM | TEMPERATURE: 98.3 F | WEIGHT: 264.33 LBS | RESPIRATION RATE: 20 BRPM | SYSTOLIC BLOOD PRESSURE: 126 MMHG

## 2021-03-09 PROBLEM — K59.09 CHRONIC CONSTIPATION: Status: RESOLVED | Noted: 2021-03-05 | Resolved: 2021-03-09

## 2021-03-09 PROBLEM — K57.92 DIVERTICULITIS: Status: ACTIVE | Noted: 2021-03-09

## 2021-03-09 LAB
ABO + RH BLD: NORMAL
ANION GAP SERPL CALC-SCNC: 5 MMOL/L (ref 5–15)
BASOPHILS # BLD: 0 K/UL (ref 0–0.1)
BASOPHILS NFR BLD: 1 % (ref 0–1)
BLD PROD TYP BPU: NORMAL
BLD PROD TYP BPU: NORMAL
BLOOD GROUP ANTIBODIES SERPL: NEGATIVE
BPU ID: NORMAL
BPU ID: NORMAL
BUN SERPL-MCNC: 6 MG/DL (ref 6–20)
BUN/CREAT SERPL: 7 (ref 12–20)
CA-I BLD-MCNC: 8.4 MG/DL (ref 8.5–10.1)
CHLORIDE SERPL-SCNC: 110 MMOL/L (ref 97–108)
CO2 SERPL-SCNC: 27 MMOL/L (ref 21–32)
CREAT SERPL-MCNC: 0.88 MG/DL (ref 0.7–1.3)
CROSSMATCH RESULT,%XM: NORMAL
CROSSMATCH RESULT,%XM: NORMAL
DIFFERENTIAL METHOD BLD: ABNORMAL
EOSINOPHIL # BLD: 0.4 K/UL (ref 0–0.4)
EOSINOPHIL NFR BLD: 7 % (ref 0–7)
ERYTHROCYTE [DISTWIDTH] IN BLOOD BY AUTOMATED COUNT: 15 % (ref 11.5–14.5)
GLUCOSE SERPL-MCNC: 139 MG/DL (ref 65–100)
HCT VFR BLD AUTO: 26.3 % (ref 36.6–50.3)
HGB BLD-MCNC: 8.3 G/DL (ref 12.1–17)
IMM GRANULOCYTES # BLD AUTO: 0 K/UL (ref 0–0.04)
IMM GRANULOCYTES NFR BLD AUTO: 1 % (ref 0–0.5)
LYMPHOCYTES # BLD: 1.4 K/UL (ref 0.8–3.5)
LYMPHOCYTES NFR BLD: 21 % (ref 12–49)
MCH RBC QN AUTO: 28.2 PG (ref 26–34)
MCHC RBC AUTO-ENTMCNC: 31.6 G/DL (ref 30–36.5)
MCV RBC AUTO: 89.5 FL (ref 80–99)
MONOCYTES # BLD: 0.7 K/UL (ref 0–1)
MONOCYTES NFR BLD: 10 % (ref 5–13)
NEUTS SEG # BLD: 4.1 K/UL (ref 1.8–8)
NEUTS SEG NFR BLD: 60 % (ref 32–75)
PLATELET # BLD AUTO: 247 K/UL (ref 150–400)
PMV BLD AUTO: 10.2 FL (ref 8.9–12.9)
POTASSIUM SERPL-SCNC: 3.4 MMOL/L (ref 3.5–5.1)
RBC # BLD AUTO: 2.94 M/UL (ref 4.1–5.7)
SODIUM SERPL-SCNC: 142 MMOL/L (ref 136–145)
SPECIMEN EXP DATE BLD: NORMAL
STATUS OF UNIT,%ST: NORMAL
STATUS OF UNIT,%ST: NORMAL
TRANSFUSION STATUS PATIENT QL: NORMAL
TRANSFUSION STATUS PATIENT QL: NORMAL
UNIT DIVISION, %UDIV: 0
UNIT DIVISION, %UDIV: 0
WBC # BLD AUTO: 6.6 K/UL (ref 4.1–11.1)

## 2021-03-09 PROCEDURE — 85025 COMPLETE CBC W/AUTO DIFF WBC: CPT

## 2021-03-09 PROCEDURE — 36415 COLL VENOUS BLD VENIPUNCTURE: CPT

## 2021-03-09 PROCEDURE — 74011250636 HC RX REV CODE- 250/636: Performed by: STUDENT IN AN ORGANIZED HEALTH CARE EDUCATION/TRAINING PROGRAM

## 2021-03-09 PROCEDURE — 74011250637 HC RX REV CODE- 250/637: Performed by: INTERNAL MEDICINE

## 2021-03-09 PROCEDURE — 74011250637 HC RX REV CODE- 250/637: Performed by: NURSE PRACTITIONER

## 2021-03-09 PROCEDURE — 74011000250 HC RX REV CODE- 250: Performed by: NURSE PRACTITIONER

## 2021-03-09 PROCEDURE — 74011250636 HC RX REV CODE- 250/636: Performed by: INTERNAL MEDICINE

## 2021-03-09 PROCEDURE — 74011250636 HC RX REV CODE- 250/636: Performed by: NURSE PRACTITIONER

## 2021-03-09 PROCEDURE — 80048 BASIC METABOLIC PNL TOTAL CA: CPT

## 2021-03-09 RX ORDER — MORPHINE SULFATE 2 MG/ML
2 INJECTION, SOLUTION INTRAMUSCULAR; INTRAVENOUS
Status: DISCONTINUED | OUTPATIENT
Start: 2021-03-09 | End: 2021-03-09 | Stop reason: HOSPADM

## 2021-03-09 RX ORDER — GUAIFENESIN 100 MG/5ML
81 LIQUID (ML) ORAL DAILY
Qty: 30 TAB | Refills: 2 | Status: SHIPPED | OUTPATIENT
Start: 2021-03-09

## 2021-03-09 RX ADMIN — METOPROLOL TARTRATE 2.5 MG: 5 INJECTION INTRAVENOUS at 06:22

## 2021-03-09 RX ADMIN — METOPROLOL TARTRATE 2.5 MG: 5 INJECTION INTRAVENOUS at 11:31

## 2021-03-09 RX ADMIN — MORPHINE SULFATE 2 MG: 2 INJECTION, SOLUTION INTRAMUSCULAR; INTRAVENOUS at 04:38

## 2021-03-09 RX ADMIN — NITROGLYCERIN 1 INCH: 20 OINTMENT TOPICAL at 00:38

## 2021-03-09 RX ADMIN — Medication 10 ML: at 06:25

## 2021-03-09 RX ADMIN — FAMOTIDINE 20 MG: 10 INJECTION, SOLUTION INTRAVENOUS at 10:32

## 2021-03-09 RX ADMIN — FERROUS SULFATE TAB 325 MG (65 MG ELEMENTAL FE) 325 MG: 325 (65 FE) TAB at 10:32

## 2021-03-09 RX ADMIN — MORPHINE SULFATE 2 MG: 2 INJECTION, SOLUTION INTRAMUSCULAR; INTRAVENOUS at 13:42

## 2021-03-09 RX ADMIN — ATORVASTATIN CALCIUM 40 MG: 40 TABLET, FILM COATED ORAL at 10:32

## 2021-03-09 RX ADMIN — NITROGLYCERIN 1 INCH: 20 OINTMENT TOPICAL at 06:21

## 2021-03-09 RX ADMIN — NITROGLYCERIN 1 INCH: 20 OINTMENT TOPICAL at 11:30

## 2021-03-09 RX ADMIN — ISOSORBIDE MONONITRATE 60 MG: 30 TABLET, EXTENDED RELEASE ORAL at 06:22

## 2021-03-09 NOTE — DISCHARGE SUMMARY
Hospitalist Discharge Summary     Patient ID:    Dimitrios Arenas  [id number]  40 y.o.  1945    Admit date: 3/5/2021    Discharge date : 3/9/2021    Chronic Diagnoses:    Problem List as of 3/9/2021 Date Reviewed: 3/8/2021          Codes Class Noted - Resolved    Diverticulitis ICD-10-CM: K57.92  ICD-9-CM: 562.11  3/9/2021 - Present        * (Principal) Rectal bleeding ICD-10-CM: K62.5  ICD-9-CM: 569.3  3/5/2021 - Present        Abdominal pain ICD-10-CM: R10.9  ICD-9-CM: 789.00  3/5/2021 - Present        Constipation ICD-10-CM: K59.00  ICD-9-CM: 564.00  3/5/2021 - Present        Small bowel obstruction ([address] ICD-10-CM: F99.261  ICD-9-CM: 560.9  2/25/2021 - Present        RESOLVED: Chronic constipation ICD-10-CM: K59.09  ICD-9-CM: 564.00  3/5/2021 - 3/9/2021          22    Final Diagnoses:   Principal Problem:    Rectal bleeding (3/5/2021)    Active Problems:    Abdominal pain (3/5/2021)      Constipation (3/5/2021)      Diverticulitis (3/9/2021)      Hospital Course:   Huseyin Cure a 13-year-old obese male with MI, CAD s/p CABG (1999) and PTCA/BRAYDEN (2017), HTN, HLD and previous small bowel obstructions who presented to the emergency room for 1 week abdominal pain, continued constipation at home, then rectal bleeding overnight. Patient was seen last week in the emergency room for constipation/ileus requiring fleets enema, NG tube decompression, and discharged home. Patient states he has not had a bowel movement since then. Bill Morrison takes Plavix and aspirin status post cardiac stent.  Never get labs on admission hemoglobin 9.3 down from 11.6, BUN 27, creatinine 1.39 both elevated from previous numbers.  CT of abdomen revealed diverticulosis without active diverticulitis, small bowel without obstruction. Patient admitted for further management of lower GI bleed.  Consult GI. Started on IV fluids. Home ASA and Plavix held. Cardiology consulted because patient reported chest pain.  EKG NSR, RBBB, without evidence of ischemia. Serial cardiac enzymes negative. Continued on nitrates and beta-blocker for antianginal coverage. Transfused 2 units PRBCs for hemoglobin dropped to 6.9. Surgery consulted. On 3/8/2021 had colonoscopy which showed diverticulitis, polyp that was biopsied. No further episodes of rectal bleeding. Hgb stable at 8.3. Surgery recommending high-fiber diet at time of discharge and outpatient follow-up. Spoke with patient's primary cardiologist, Dr. Candi Serrano, who recommends to discontinue patient's Plavix. Vitals and labs stable on day of discharge. Discharge Medications:   Current Discharge Medication List      START taking these medications    Details   aspirin 81 mg chewable tablet Take 1 Tab by mouth daily. Qty: 30 Tab, Refills: 2         CONTINUE these medications which have NOT CHANGED    Details   atorvastatin (LIPITOR) 40 mg tablet Take  by mouth daily. famotidine (PEPCID) 20 mg tablet Take 1 Tab by mouth nightly. Qty: 30 Tab, Refills: 1      traZODone (DESYREL) 50 mg tablet Take 50 mg by mouth nightly as needed for Insomnia. nitroglycerin (NITROLINGUAL) 400 mcg/spray spray 1 Spray by SubLINGual route every five (5) minutes as needed for Chest Pain. furosemide (LASIX) 40 mg tablet Take  by mouth daily. naproxen (NAPROSYN) 500 mg tablet Take 500 mg by mouth two (2) times daily (with meals). olmesartan (BENICAR) 20 mg tablet Take 20 mg by mouth daily. metoprolol tartrate (LOPRESSOR) 100 mg IR tablet Take  by mouth two (2) times a day. isosorbide mononitrate ER (IMDUR) 60 mg CR tablet Take  by mouth every morning. STOP taking these medications       clopidogreL (PLAVIX) 75 mg tab Comments:   Reason for Stopping: Follow up Care:    1. Asha Espinoza MD in 1-2 weeks.       Follow-up Information     Follow up With Specialties Details Why Contact Info    Asha Espinoza MD Family Medicine  As needed [address] [place]  [phone]      Lesly Ji MD Cardiology Schedule an appointment as soon as possible for a visit in 1 week  [address]      Jayla Monroy MD General Surgery Schedule an appointment as soon as possible for a visit in 1 week Hospital f/u colonoscopy, diverticulitis. [address]  [phone]              Patient Follow Up Instructions: Activity: Activity as tolerated  Diet:  HIGH FIBER DIET    Condition at Discharge:  Stable  __________________________________________________________________    Disposition  Home or Self Care  ____________________________________________________________________    Code Status:  Full Code  ___________________________________________________________________    Discharge Exam:  Patient seen and examined by me on discharge day. Pertinent Findings:    Gen:    Obese AA male. Not in distress  Chest: Clear lungs without wheezing, rhonchi, or rales. On room air. CVS:   Regular rate and rhythm. Normal S1/S2. No murmur. No peripheral edema  Abd:  Soft, mildly distended, not tender  Neuro:  Alert and oriented x3    CONSULTATIONS: Cardiology, GI and General Surgery    Significant Diagnostic Studies:   Recent Results (from the past 24 hour(s))   CBC WITH AUTOMATED DIFF    Collection Time: 03/09/21  6:05 AM   Result Value Ref Range    WBC 6.6 4.1 - 11.1 K/uL    RBC 2.94 (L) 4.10 - 5.70 M/uL    HGB 8.3 (L) 12.1 - 17.0 g/dL    HCT 26.3 (L) 36.6 - 50.3 %    MCV 89.5 80.0 - 99.0 FL    MCH 28.2 26.0 - 34.0 PG    MCHC 31.6 30.0 - 36.5 g/dL    RDW 15.0 (H) 11.5 - 14.5 %    PLATELET 816 073 - 147 K/uL    MPV 10.2 8.9 - 12.9 FL    NEUTROPHILS 60 32 - 75 %    LYMPHOCYTES 21 12 - 49 %    MONOCYTES 10 5 - 13 %    EOSINOPHILS 7 0 - 7 %    BASOPHILS 1 0 - 1 %    IMMATURE GRANULOCYTES 1 (H) 0.0 - 0.5 %    ABS. NEUTROPHILS 4.1 1.8 - 8.0 K/UL    ABS. LYMPHOCYTES 1.4 0.8 - 3.5 K/UL    ABS. MONOCYTES 0.7 0.0 - 1.0 K/UL    ABS. EOSINOPHILS 0.4 0.0 - 0.4 K/UL    ABS. BASOPHILS 0.0 0.0 - 0.1 K/UL    ABS. IMM.  GRANS. 0.0 0.00 - 0.04 K/UL    DF AUTOMATED     METABOLIC PANEL, BASIC    Collection Time: 03/09/21  6:05 AM   Result Value Ref Range    Sodium 142 136 - 145 mmol/L    Potassium 3.4 (L) 3.5 - 5.1 mmol/L    Chloride 110 (H) 97 - 108 mmol/L    CO2 27 21 - 32 mmol/L    Anion gap 5 5 - 15 mmol/L    Glucose 139 (H) 65 - 100 mg/dL    BUN 6 6 - 20 mg/dL    Creatinine 0.88 0.70 - 1.30 mg/dL    BUN/Creatinine ratio 7 (L) 12 - 20      GFR est AA >60 >60 ml/min/1.73m2    GFR est non-AA >60 >60 ml/min/1.73m2    Calcium 8.4 (L) 8.5 - 10.1 mg/dL     XR CHEST PORT   Final Result              Signed:  Ashley Martinez PA-C  3/9/2021  2:21 PM

## 2021-03-09 NOTE — DISCHARGE SUMMARY
Hospitalist Discharge Summary     Patient ID:    Dimitrios Arenas  [id number]  72 y.o.  1945    Admit date: 3/5/2021    Discharge date : 3/9/2021    Chronic Diagnoses:    Problem List as of 3/9/2021 Date Reviewed: 3/8/2021          Codes Class Noted - Resolved    Diverticulitis ICD-10-CM: K57.92  ICD-9-CM: 562.11  3/9/2021 - Present        * (Principal) Rectal bleeding ICD-10-CM: K62.5  ICD-9-CM: 569.3  3/5/2021 - Present        Abdominal pain ICD-10-CM: R10.9  ICD-9-CM: 789.00  3/5/2021 - Present        Constipation ICD-10-CM: K59.00  ICD-9-CM: 564.00  3/5/2021 - Present        Small bowel obstruction ([address] ICD-10-CM: F46.327  ICD-9-CM: 560.9  2/25/2021 - Present        RESOLVED: Chronic constipation ICD-10-CM: K59.09  ICD-9-CM: 564.00  3/5/2021 - 3/9/2021          22    Final Diagnoses:   Principal Problem:    Rectal bleeding (3/5/2021)    Active Problems:    Abdominal pain (3/5/2021)      Constipation (3/5/2021)      Diverticulitis (3/9/2021)        Hospital Course:   Huseyin Weeks a 78-year-old obese male with MI, CAD s/p CABG (1999) and PTCA/BRAYDEN (2017), HTN, HLD and previous small bowel obstructions who presented to the emergency room for 1 week abdominal pain, continued constipation at home, then rectal bleeding overnight. Patient was seen last week in the emergency room for constipation/ileus requiring fleets enema, NG tube decompression, and discharged home. Patient states he has not had a bowel movement since then. Bill Morrison takes Plavix and aspirin status post cardiac stent.  Never get labs on admission hemoglobin 9.3 down from 11.6, BUN 27, creatinine 1.39 both elevated from previous numbers.  CT of abdomen revealed diverticulosis without active diverticulitis, small bowel without obstruction. Patient admitted for further management of lower GI bleed.  Consult GI. Started on IV fluids. Home ASA and Plavix held. Cardiology consulted because patient reported chest pain.  EKG NSR, RBBB, without evidence of ischemia. Serial cardiac enzymes negative. Continued on nitrates and beta-blocker for antianginal coverage. Transfused 2 units PRBCs for hemoglobin dropped to 6.9. Surgery consulted. On 3/8/2021 had colonoscopy which showed diverticulitis, polyp that was biopsied. Surgery recommending high-fiber diet at time of discharge and outpatient follow-up. Vitals and labs stable on day of discharge. Discharge Medications:   Current Discharge Medication List            Follow up Care:    1. Marilu Rosa MD in 1-2 weeks. Follow-up Information     Follow up With Specialties Details Why Contact Info    Marilu Rosa MD Family Medicine  As needed [address] [place]  [phone]      Heaven Jacob MD Cardiology Schedule an appointment as soon as possible for a visit in 1 week  [address]      Sonja Waite MD General Surgery Schedule an appointment as soon as possible for a visit in 1 week [facility] f/u colonoscopy, diverticulitis. [address] [place] [address]  [phone]              Patient Follow Up Instructions: Activity: Activity as tolerated  Diet:  HIGH FIBER DIET    Condition at Discharge:  Stable  __________________________________________________________________    Disposition  Home or Self Care  ____________________________________________________________________    Code Status:  Full Code  ___________________________________________________________________    Discharge Exam:  Patient seen and examined by me on discharge day. Pertinent Findings:    Gen:  Elderly AA male. Not in distress  Chest: Clear lungs without wheezing, rhonchi, or rales. CVS:   Regular rate and rhythm. Normal S1/S2. No murmur. No edema  Abd:  Soft, mildly distended, not tender. Neuro:  Alert and oriented x3.     CONSULTATIONS: Cardiology, GI and General Surgery    Significant Diagnostic Studies: Recent Results (from the past 24 hour(s))   CBC WITH AUTOMATED DIFF    Collection Time: 03/09/21  6:05 AM   Result Value Ref Range    WBC 6.6 4.1 - 11.1 K/uL    RBC 2.94 (L) 4.10 - 5.70 M/uL    HGB 8.3 (L) 12.1 - 17.0 g/dL    HCT 26.3 (L) 36.6 - 50.3 %    MCV 89.5 80.0 - 99.0 FL    MCH 28.2 26.0 - 34.0 PG    MCHC 31.6 30.0 - 36.5 g/dL    RDW 15.0 (H) 11.5 - 14.5 %    PLATELET 741 480 - 464 K/uL    MPV 10.2 8.9 - 12.9 FL    NEUTROPHILS 60 32 - 75 %    LYMPHOCYTES 21 12 - 49 %    MONOCYTES 10 5 - 13 %    EOSINOPHILS 7 0 - 7 %    BASOPHILS 1 0 - 1 %    IMMATURE GRANULOCYTES 1 (H) 0.0 - 0.5 %    ABS. NEUTROPHILS 4.1 1.8 - 8.0 K/UL    ABS. LYMPHOCYTES 1.4 0.8 - 3.5 K/UL    ABS. MONOCYTES 0.7 0.0 - 1.0 K/UL    ABS. EOSINOPHILS 0.4 0.0 - 0.4 K/UL    ABS. BASOPHILS 0.0 0.0 - 0.1 K/UL    ABS. IMM.  GRANS. 0.0 0.00 - 0.04 K/UL    DF AUTOMATED     METABOLIC PANEL, BASIC    Collection Time: 03/09/21  6:05 AM   Result Value Ref Range    Sodium 142 136 - 145 mmol/L    Potassium 3.4 (L) 3.5 - 5.1 mmol/L    Chloride 110 (H) 97 - 108 mmol/L    CO2 27 21 - 32 mmol/L    Anion gap 5 5 - 15 mmol/L    Glucose 139 (H) 65 - 100 mg/dL    BUN 6 6 - 20 mg/dL    Creatinine 0.88 0.70 - 1.30 mg/dL    BUN/Creatinine ratio 7 (L) 12 - 20      GFR est AA >60 >60 ml/min/1.73m2    GFR est non-AA >60 >60 ml/min/1.73m2    Calcium 8.4 (L) 8.5 - 10.1 mg/dL     XR CHEST PORT   Final Result              Signed:  Layton Jessica PA-C  3/9/2021  1:24 PM

## 2021-03-09 NOTE — PROGRESS NOTES
Patient is being discharged to home self care. CM reviewed IMM with patient. CM spoke to patient nephew Angy Pap [phone]) regarding patient being discharged home today. Nephew was fine with patient going home today. CM completed discharge checklist with Mahin Browne.

## 2021-03-09 NOTE — PROGRESS NOTES
Progress Note    Patient: Ani Cha MRN: [id number]  SSN: [id number]    YOB: 1945  Age: 77 y.o. Sex: male      Admit Date: 3/5/2021    LOS: 4 days     Subjective:   Patient examined    Tolerated diet, today had no vomiting, no nausea vomiting abdominal pain.     Yesterday colonoscopy showed diverticulosis, hemorrhoids, polyps removed  Past Medical History:   Diagnosis Date    Arthritis     Bowel obstruction ([facility]     High cholesterol     Hx of abdominal surgery     for SBO    Hypertension         Current Facility-Administered Medications:     morphine injection 2 mg, 2 mg, IntraVENous, Q2H PRN, Oliver Lorenzo PA-C, 2 mg at 03/09/21 1342    0.9% sodium chloride infusion 250 mL, 250 mL, IntraVENous, PRN, Haily Shoemaker NP    nitroglycerin (NITROBID) 2 % ointment 1 Inch, 1 Inch, Topical, Q6H, Umu Nelson MD, 1 Inch at 03/09/21 1130    atorvastatin (LIPITOR) tablet 40 mg, 40 mg, Oral, DAILY, Haily Shoemaker, NP, 40 mg at 03/09/21 1032    isosorbide mononitrate ER (IMDUR) tablet 60 mg, 60 mg, Oral, 7am, Haily Shoemaker, NP, 60 mg at 03/09/21 0622    traZODone (DESYREL) tablet 50 mg, 50 mg, Oral, QHS PRN, Haily Shoemaker, NP, 50 mg at 03/08/21 2351    sodium chloride (NS) flush 5-40 mL, 5-40 mL, IntraVENous, Q8H, Haily Shomeaker, NP, 10 mL at 03/09/21 6013    sodium chloride (NS) flush 5-40 mL, 5-40 mL, IntraVENous, PRN, Haily Shoemaker, NP  Waunita Favorite  acetaminophen (TYLENOL) tablet 650 mg, 650 mg, Oral, Q6H PRN **OR** acetaminophen (TYLENOL) suppository 650 mg, 650 mg, Rectal, Q6H PRN, Haily Shoemaker, NP    polyethylene glycol (MIRALAX) packet 17 g, 17 g, Oral, DAILY PRN, Haily Shoemaker, NP    bisacodyL (DULCOLAX) tablet 5 mg, 5 mg, Oral, DAILY PRN, Haily Shoemaker NP    ondansetron (ZOFRAN ODT) tablet 4 mg, 4 mg, Oral, Q6H PRN **OR** ondansetron (ZOFRAN) injection 4 mg, 4 mg, IntraVENous, Q6H PRN, Haily Shoemaker NP, 4 mg at 03/06/21 1161    metoprolol (LOPRESSOR) injection 2.5 mg, 2.5 mg, IntraVENous, Q6H, Nilda Torres NP, 2.5 mg at 03/09/21 1131    0.9% sodium chloride infusion, 125 mL/hr, IntraVENous, CONTINUOUS, Nilda Torres NP, Last Rate: 125 mL/hr at 03/08/21 2145, 125 mL/hr at 03/08/21 2145    famotidine (PF) (PEPCID) 20 mg in 0.9% sodium chloride 10 mL injection, 20 mg, IntraVENous, Q12H, Nilda Torres NP, 20 mg at 03/09/21 1032    ferrous sulfate tablet 325 mg, 1 Tab, Oral, BID WITH MEALS, Amanda Rasmussen MD, 325 mg at 03/09/21 1032    Objective:     Vitals:    03/09/21 0621 03/09/21 0741 03/09/21 1026 03/09/21 1130   BP: (!) 131/47 (!) 188/74 (!) 130/54 (!) 126/54   Pulse: 83 91 86 84   Resp:  20     Temp:  98.3 °F (36.8 °C)     SpO2:  97%     Weight:       Height:            Intake and Output:  Current Shift: 03/09 0701 - 03/09 1900  In: -   Out: 1 [Urine:1]  Last three shifts: 03/07 1901 - 03/09 0700  In: 608.7   Out: -     Physical Exam:   Physical Exam   Constitutional: No distress. HENT:   Head: Normocephalic and atraumatic. Eyes: Pupils are equal, round, and reactive to light. Conjunctivae are normal.   Neck: Normal range of motion. Neck supple. Cardiovascular: Normal heart sounds. Pulmonary/Chest: Effort normal.   Abdominal: Soft. Bowel sounds are normal.   Musculoskeletal: Normal range of motion. Neurological: He is alert. Skin: Skin is dry. Psychiatric: He has a normal mood and affect.         Lab/Data Review:  Recent Results (from the past 24 hour(s))   CBC WITH AUTOMATED DIFF    Collection Time: 03/09/21  6:05 AM   Result Value Ref Range    WBC 6.6 4.1 - 11.1 K/uL    RBC 2.94 (L) 4.10 - 5.70 M/uL    HGB 8.3 (L) 12.1 - 17.0 g/dL    HCT 26.3 (L) 36.6 - 50.3 %    MCV 89.5 80.0 - 99.0 FL    MCH 28.2 26.0 - 34.0 PG    MCHC 31.6 30.0 - 36.5 g/dL    RDW 15.0 (H) 11.5 - 14.5 %    PLATELET 335 631 - 176 K/uL    MPV 10.2 8.9 - 12.9 FL    NEUTROPHILS 60 32 - 75 %    LYMPHOCYTES 21 12 - 49 %    MONOCYTES 10 5 - 13 % EOSINOPHILS 7 0 - 7 %    BASOPHILS 1 0 - 1 %    IMMATURE GRANULOCYTES 1 (H) 0.0 - 0.5 %    ABS. NEUTROPHILS 4.1 1.8 - 8.0 K/UL    ABS. LYMPHOCYTES 1.4 0.8 - 3.5 K/UL    ABS. MONOCYTES 0.7 0.0 - 1.0 K/UL    ABS. EOSINOPHILS 0.4 0.0 - 0.4 K/UL    ABS. BASOPHILS 0.0 0.0 - 0.1 K/UL    ABS. IMM.  GRANS. 0.0 0.00 - 0.04 K/UL    DF AUTOMATED     METABOLIC PANEL, BASIC    Collection Time: 03/09/21  6:05 AM   Result Value Ref Range    Sodium 142 136 - 145 mmol/L    Potassium 3.4 (L) 3.5 - 5.1 mmol/L    Chloride 110 (H) 97 - 108 mmol/L    CO2 27 21 - 32 mmol/L    Anion gap 5 5 - 15 mmol/L    Glucose 139 (H) 65 - 100 mg/dL    BUN 6 6 - 20 mg/dL    Creatinine 0.88 0.70 - 1.30 mg/dL    BUN/Creatinine ratio 7 (L) 12 - 20      GFR est AA >60 >60 ml/min/1.73m2    GFR est non-AA >60 >60 ml/min/1.73m2    Calcium 8.4 (L) 8.5 - 10.1 mg/dL        XR CHEST PORT   Final Result           Assessment:     Principal Problem:    Rectal bleeding (3/5/2021)    Active Problems:    Abdominal pain (3/5/2021)      Constipation (3/5/2021)      Diverticulitis (3/9/2021)    Abdominal pain and distention, possible SBO: Surgery consultation notes noted, no evidence of bowel obstruction, abdominal pain resolved,    Chronic constipation:   Rectal bleeding:after his enema,\  CT scan of abdomen showed diverticulosis without diverticulitis  BUN and creatinine returned to baseline after hydration, may be due to volume depetion     nobleeding overnight,  Hemoglobin slightly dropped,           DVT Prophylaxis: SCDs     Plan:   May start Plavix and aspirin  Monitor hemoglobin             Signed By: Susan Lloyd MD     March 9, 2021        Thank you for allowing me to participate in this patients care  Cc Referring Physician   Carla Dobbs MD

## 2021-03-09 NOTE — PROGRESS NOTES
[address] SURGERY PROGRESS NOTE          Chief Complaint: rectal bleed x 3 days    History of Present Illness:    Mr. Leda Mcnulty is a 77y.o. year old * male admitted with 2 day history of painless rectal bleed for the past 3 days. No nausea or vomiting. He has been on Plavix. Had colonoscopy today. Results awaited. No further episodes of rectal bleeding. Past Medical History:   Past Medical History:   Diagnosis Date    Arthritis     Bowel obstruction ([address]     High cholesterol     Hx of abdominal surgery     for SBO    Hypertension        Past Surgical History: Exploratory Laparotomy for bowel obstruction. Allergy:No Known Allergies    Social History:  reports that he quit smoking about 2 months ago. He has a 0.25 pack-year smoking history. He has never used smokeless tobacco. He reports that he does not drink alcohol or use drugs.      Family History:  Family History   Problem Relation Age of Onset    Hypertension Mother     No Known Problems Father     Hypertension Other         Current Medications:  Current Facility-Administered Medications:     0.9% sodium chloride infusion 250 mL, 250 mL, IntraVENous, PRN, Noah Brunner NP    morphine injection 2 mg, 2 mg, IntraVENous, Q2H PRN, Padma Falk MD, 2 mg at 03/08/21 2138    nitroglycerin (NITROBID) 2 % ointment 1 Inch, 1 Inch, Topical, Q6H, Stefan Ferguson MD, 1 Inch at 03/08/21 1740    atorvastatin (LIPITOR) tablet 40 mg, 40 mg, Oral, DAILY, Noah Brunner, NP, 40 mg at 03/08/21 4319    isosorbide mononitrate ER (IMDUR) tablet 60 mg, 60 mg, Oral, 7am, Noah Brunner, NP, 60 mg at 03/08/21 0700    traZODone (DESYREL) tablet 50 mg, 50 mg, Oral, QHS PRN, Noah Brunner NP, 50 mg at 03/06/21 2341    sodium chloride (NS) flush 5-40 mL, 5-40 mL, IntraVENous, Q8H, Noah Brunner, NP, 10 mL at 03/08/21 2146    sodium chloride (NS) flush 5-40 mL, 5-40 mL, IntraVENous, PRN, Noah Brunner NP    acetaminophen (TYLENOL) tablet 650 mg, 650 mg, Oral, Q6H PRN **OR** acetaminophen (TYLENOL) suppository 650 mg, 650 mg, Rectal, Q6H PRN, Hammad Giraldo, NP    polyethylene glycol (MIRALAX) packet 17 g, 17 g, Oral, DAILY PRN, Hammad Giraldo, NP    bisacodyL (DULCOLAX) tablet 5 mg, 5 mg, Oral, DAILY PRN, Hammad Giraldo, NP    ondansetron (ZOFRAN ODT) tablet 4 mg, 4 mg, Oral, Q6H PRN **OR** ondansetron (ZOFRAN) injection 4 mg, 4 mg, IntraVENous, Q6H PRN, Hammad Giraldo, NP, 4 mg at 03/06/21 2341    metoprolol (LOPRESSOR) injection 2.5 mg, 2.5 mg, IntraVENous, Q6H, Hammad Giraldo, NP, 2.5 mg at 03/08/21 1740    0.9% sodium chloride infusion, 125 mL/hr, IntraVENous, CONTINUOUS, Hammad Giraldo, KWABENA, Last Rate: 125 mL/hr at 03/08/21 2145, 125 mL/hr at 03/08/21 2145    famotidine (PF) (PEPCID) 20 mg in 0.9% sodium chloride 10 mL injection, 20 mg, IntraVENous, Q12H, Hammad Giraldo, KWABENA, 20 mg at 03/08/21 2138    ferrous sulfate tablet 325 mg, 1 Tab, Oral, BID WITH MEALS, Erica Sage MD, Stopped at 03/08/21 0800     Immunization History: There is no immunization history on file for this patient. Complete    Review of Systems:     Constitutional:  no fever,  no chills,  no sweats, No weakness, No fatigue, No decreased activity. Eye: No recent visual problem, No icterus, No discharge, No double vision. Ear/Nose/Mouth/Throat: No decreased hearing, No ear pain, No nasal congestion, No sore throat. Respiratory: No shortness of breath, No cough, No sputum production, No hemoptysis, No wheezing, No cyanosis. Cardiovascular: No chest pain, No palpitations, No bradycardia, No tachycardia, No peripheral edema, No syncope. Gastrointestinal: No nausea,  No vomiting, No diarrhea, No constipation, No heartburn,  No abdominal pain, rectal bleeding. .  Genitourinary: No dysuria, No hematuria, No change in urine stream, No urethral discharge, No lesions.   Hematology/Lymphatics: No bruising tendency, No bleeding tendency, No petechiae, No swollen lymph glands. Endocrine: No excessive thirst, No polyuria, No cold intolerance, No heat intolerance, No excessive hunger. Immunologic: Not immunocompromised, No recurrent fevers, No recurrent infections. Musculoskeletal: No back pain, No neck pain, No joint pain, No muscle pain, No claudication, No decreased range of motion, No trauma. Integumentary: No rash, No pruritus, No abrasions. Neurologic: Alert and oriented X4, No abnormal balance, No headache, No confusion, No numbness, No tingling. Psychiatric: No anxiety, No depression, No winnie. Physical Exam:     Vitals & Measurements: Wt Readings from Last 3 Encounters:   03/08/21 118.4 kg (261 lb 0.4 oz)   03/05/21 117 kg (258 lb)   02/24/21 117 kg (258 lb)     Temp Readings from Last 3 Encounters:   03/08/21 98.9 °F (37.2 °C)   03/05/21 98.3 °F (36.8 °C)   03/01/21 98.3 °F (36.8 °C)     BP Readings from Last 3 Encounters:   03/08/21 (!) 157/74   03/05/21 132/79   03/01/21 120/72     Pulse Readings from Last 3 Encounters:   03/08/21 79   03/05/21 94   03/01/21 97      Ht Readings from Last 3 Encounters:   03/05/21 5' 9\" (1.753 m)   03/05/21 5' 9\" (1.753 m)   02/24/21 5' 9\" (1.753 m)          General: well appearing, no acute distress  Head: Normal  Face: Nornal  HEENT: atraumatic, PERRLA, moist mucosa, normal pharynx, normal tonsils and adenoids, normal tongue, no fluid in sinuses  Neck: Trachea midline, no carotid bruit, no masses  Chest: Normal.  Respiratory: Normal chest wall expansion, CTA B, no r/r/w, no rubs  Cardiovascular: RRR, no m/r/g, Normal S1 and S2  Abdomen: Soft, non tender, non-distended, normal bowel sounds in all quadrants, no hepatosplenomegaly, no tympany. Incision scar: Long midline scar. Genitourinary: No inguinal hernia, normal external gentalia, Testis & scrotum normal, no renal angle tenderness  Rectal: Some blood. Musculoskeletal: normal ROM in upper and lower extremities, No joint swelling.   Integumentary: Warm, dry, and pink, with no rash, purpura, or petechia  Heme/Lymph: No lymphadenopathy, no bruises  Neurological:Cranial Nerves II-XII grossly intact, no gross motor or sensory deficit  Psychiatric: Cooperative with normal mood, affect, and cognition      Laboratory Values:   Recent Results (from the past 24 hour(s))   CBC WITH AUTOMATED DIFF    Collection Time: 03/08/21  6:28 AM   Result Value Ref Range    WBC 7.1 4.1 - 11.1 K/uL    RBC 3.04 (L) 4.10 - 5.70 M/uL    HGB 8.6 (L) 12.1 - 17.0 g/dL    HCT 27.0 (L) 36.6 - 50.3 %    MCV 88.8 80.0 - 99.0 FL    MCH 28.3 26.0 - 34.0 PG    MCHC 31.9 30.0 - 36.5 g/dL    RDW 15.0 (H) 11.5 - 14.5 %    PLATELET 283 718 - 215 K/uL    MPV 10.6 8.9 - 12.9 FL    NRBC 0.0 0  WBC    ABSOLUTE NRBC 0.00 0.00 - 0.01 K/uL    NEUTROPHILS 57 32 - 75 %    LYMPHOCYTES 26 12 - 49 %    MONOCYTES 10 5 - 13 %    EOSINOPHILS 6 0 - 7 %    BASOPHILS 0 0 - 1 %    IMMATURE GRANULOCYTES 1 (H) 0.0 - 0.5 %    ABS. NEUTROPHILS 4.1 1.8 - 8.0 K/UL    ABS. LYMPHOCYTES 1.8 0.8 - 3.5 K/UL    ABS. MONOCYTES 0.7 0.0 - 1.0 K/UL    ABS. EOSINOPHILS 0.4 0.0 - 0.4 K/UL    ABS. BASOPHILS 0.0 0.0 - 0.1 K/UL    ABS. IMM.  GRANS. 0.1 (H) 0.00 - 0.04 K/UL    DF AUTOMATED     METABOLIC PANEL, BASIC    Collection Time: 03/08/21  6:28 AM   Result Value Ref Range    Sodium 141 136 - 145 mmol/L    Potassium 3.8 3.5 - 5.1 mmol/L    Chloride 112 (H) 97 - 108 mmol/L    CO2 25 21 - 32 mmol/L    Anion gap 4 (L) 5 - 15 mmol/L    Glucose 85 65 - 100 mg/dL    BUN 5 (L) 6 - 20 mg/dL    Creatinine 0.84 0.70 - 1.30 mg/dL    BUN/Creatinine ratio 6 (L) 12 - 20      GFR est AA >60 >60 ml/min/1.73m2    GFR est non-AA >60 >60 ml/min/1.73m2    Calcium 8.0 (L) 8.5 - 10.1 mg/dL   TROPONIN I    Collection Time: 03/08/21  6:28 AM   Result Value Ref Range    Troponin-I, Qt. <0.05 <0.05 ng/mL   EKG, 12 LEAD, SUBSEQUENT    Collection Time: 03/08/21  8:15 AM   Result Value Ref Range    Ventricular Rate 86 BPM    Atrial Rate 86 BPM    P-R Interval 178 ms QRS Duration 108 ms    Q-T Interval 372 ms    QTC Calculation (Bezet) 445 ms    Calculated P Axis 53 degrees    Calculated R Axis -6 degrees    Calculated T Axis 46 degrees    Diagnosis       Normal sinus rhythm  Incomplete right bundle branch block  Borderline ECG  When compared with ECG of 06-MAR-2021 21:47,  Premature ventricular complexes are no longer Present  Confirmed by Ella PANTOJA, [address] [place] [address] ([facility]) on 3/8/2021 9:43:23 AM         Assessment:  Problem List Items Addressed This Visit     None      Visit Diagnoses     Gastrointestinal hemorrhage, unspecified gastrointestinal hemorrhage type    -  Primary        Most likely Diverticular or Hemorrhoidal      Hemoglobin stable after pRBC transfusion. Plan:    1. Admission  2. Diet as tolerated. 3. SCD  4. IS  5. Pain medications  6. Nausea medication  7. Labs: H&H Q8 hours. 8. pRBC to be transfused as needed  9. Await Colonoscopy report. Thank you for the consultation & allowing me to participate in the care of this patient.

## 2021-03-09 NOTE — PROGRESS NOTES
Physician Progress Note      PATIENT:               Kiara Santillan  CSN #:                  [id number]  :                       1945  ADMIT DATE:       3/5/2021 4:12 PM  [address] [place] DATE:  RESPONDING  PROVIDER #:        Padmini Hoover MD          QUERY TEXT:    Pt admitted with rectal bleeding. Pt noted to have a drop in hemoglobin. If possible, please document in the progress notes and discharge summary if you are evaluating and/or treating any of the following: The medical record reflects the following:  Risk Factors: rectal bleeding, PAPITO, HTN, CAD  Clinical Indicators: Hemoglobin 3/5 = 9.3  Hemoglobin 3/4 = 6.9  Treatment: Transfusion of PRBC, lab monitoring    Thank you,  Diya Caputo RN, CCDS, CPC  Options provided:  -- Acute blood loss anemia  -- Chronic blood loss anemia  -- Acute on chronic blood loss anemia  -- Iron deficiency anemia  -- Postoperative acute blood loss anemia  -- Dilutional anemia  -- Precipitous drop in Hemoglobin and Hematocrit  -- Other - I will add my own diagnosis  -- Disagree - Not applicable / Not valid  -- Disagree - Clinically unable to determine / Unknown  -- Refer to Clinical Documentation Reviewer    PROVIDER RESPONSE TEXT:    This patient has acute on chronic blood loss anemia.     Query created by: Gigi Javed on 3/8/2021 2:59 PM      Electronically signed by:  Padmini Hoover MD 3/9/2021 11:24 AM

## 2021-03-09 NOTE — PROGRESS NOTES
Discharge plan of care/case management plan validated with provider discharge order. Discharge instructions reviewed with patient at bedside. Patient acknowledged understanding of dc instructions and follow up appointments scheduled with his PCP, Dr. Celine Sparks and Dr. Last Mensah. All patient belongings accounted for. Escorted down to personal vehicle via wheelchair.

## 2021-03-10 NOTE — ANESTHESIA POSTPROCEDURE EVALUATION
Procedure(s):  COLON BIOPSY.     total IV anesthesia, general    Anesthesia Post Evaluation      Multimodal analgesia: multimodal analgesia not used between 6 hours prior to anesthesia start to PACU discharge  Patient location during evaluation: PACU  Patient participation: complete - patient participated  Level of consciousness: awake and alert  Pain score: 0  Pain management: adequate  Airway patency: patent  Anesthetic complications: no  Cardiovascular status: acceptable, blood pressure returned to baseline and hemodynamically stable  Respiratory status: acceptable, nonlabored ventilation, spontaneous ventilation, unassisted and room air  Hydration status: acceptable  Post anesthesia nausea and vomiting:  none  Final Post Anesthesia Temperature Assessment:  Normothermia (36.0-37.5 degrees C)      INITIAL Post-op Vital signs:   Vitals Value Taken Time   /54 03/09/21 1130   Temp 36.8 °C (98.3 °F) 03/09/21 0741   Pulse 84 03/09/21 1130   Resp 20 03/09/21 0741   SpO2 97 % 03/09/21 0741

## 2021-04-09 ENCOUNTER — TRANSCRIBE ORDER (OUTPATIENT)
Dept: SCHEDULING | Age: 76
End: 2021-04-09

## 2021-04-09 DIAGNOSIS — N62 GYNECOMASTIA: Primary | ICD-10-CM

## 2021-04-19 ENCOUNTER — HOSPITAL ENCOUNTER (OUTPATIENT)
Dept: MAMMOGRAPHY | Age: 76
Discharge: HOME | End: 2021-04-19
Attending: SURGERY
Payer: MEDICARE

## 2021-04-19 ENCOUNTER — TRANSCRIBE ORDER (OUTPATIENT)
Dept: REGISTRATION | Age: 76
End: 2021-04-19

## 2021-04-19 ENCOUNTER — HOSPITAL ENCOUNTER (OUTPATIENT)
Dept: ULTRASOUND IMAGING | Age: 76
Discharge: HOME | End: 2021-04-19
Attending: SURGERY
Payer: MEDICARE

## 2021-04-19 DIAGNOSIS — N62 GYNECOMASTIA: Primary | ICD-10-CM

## 2021-04-19 DIAGNOSIS — N62 GYNECOMASTIA: ICD-10-CM

## 2021-04-19 PROCEDURE — 76642 ULTRASOUND BREAST LIMITED: CPT

## 2021-04-19 PROCEDURE — 77066 DX MAMMO INCL CAD BI: CPT

## 2021-05-04 ENCOUNTER — HOSPITAL ENCOUNTER (EMERGENCY)
Age: 76
Discharge: HOME | End: 2021-05-05
Attending: EMERGENCY MEDICINE
Payer: MEDICARE

## 2021-05-04 VITALS
WEIGHT: 250 LBS | DIASTOLIC BLOOD PRESSURE: 83 MMHG | OXYGEN SATURATION: 94 % | HEART RATE: 83 BPM | RESPIRATION RATE: 18 BRPM | HEIGHT: 69 IN | BODY MASS INDEX: 37.03 KG/M2 | TEMPERATURE: 98.5 F | SYSTOLIC BLOOD PRESSURE: 135 MMHG

## 2021-05-04 DIAGNOSIS — M10.9 ACUTE GOUT OF MULTIPLE SITES, UNSPECIFIED CAUSE: Primary | ICD-10-CM

## 2021-05-04 LAB
ALBUMIN SERPL-MCNC: 3.9 G/DL (ref 3.5–5)
ALBUMIN/GLOB SERPL: 0.9 {RATIO} (ref 1.1–2.2)
ALP SERPL-CCNC: 95 U/L (ref 45–117)
ALT SERPL-CCNC: 20 U/L (ref 12–78)
ANION GAP SERPL CALC-SCNC: 10 MMOL/L (ref 5–15)
AST SERPL W P-5'-P-CCNC: 23 U/L (ref 15–37)
BASOPHILS # BLD: 0 K/UL (ref 0–0.2)
BASOPHILS NFR BLD: 1 % (ref 0–2.5)
BILIRUB SERPL-MCNC: 0.8 MG/DL (ref 0.2–1)
BUN SERPL-MCNC: 26 MG/DL (ref 6–20)
BUN/CREAT SERPL: 21 (ref 12–20)
CA-I BLD-MCNC: 9.4 MG/DL (ref 8.5–10.1)
CHLORIDE SERPL-SCNC: 103 MMOL/L (ref 97–108)
CO2 SERPL-SCNC: 29 MMOL/L (ref 21–32)
CREAT SERPL-MCNC: 1.25 MG/DL (ref 0.7–1.3)
EOSINOPHIL # BLD: 0.4 K/UL (ref 0–0.7)
EOSINOPHIL NFR BLD: 5 % (ref 0.9–2.9)
ERYTHROCYTE [DISTWIDTH] IN BLOOD BY AUTOMATED COUNT: 15.8 % (ref 11.5–14.5)
GLOBULIN SER CALC-MCNC: 4.2 G/DL (ref 2–4)
GLUCOSE SERPL-MCNC: 136 MG/DL (ref 65–100)
HCT VFR BLD AUTO: 35.6 % (ref 41–53)
HGB BLD-MCNC: 11.5 G/DL (ref 13.5–17.5)
LYMPHOCYTES # BLD: 0.4 K/UL (ref 1–4.8)
LYMPHOCYTES NFR BLD: 5 % (ref 20.5–51.1)
MCH RBC QN AUTO: 26.3 PG (ref 31–34)
MCHC RBC AUTO-ENTMCNC: 32.3 G/DL (ref 31–36)
MCV RBC AUTO: 81.2 FL (ref 80–100)
MONOCYTES # BLD: 0.8 K/UL (ref 0.2–2.4)
MONOCYTES NFR BLD: 10 % (ref 1.7–9.3)
NEUTS SEG # BLD: 6.2 K/UL (ref 1.8–7.7)
NEUTS SEG NFR BLD: 79 % (ref 42–75)
NRBC # BLD: 0 K/UL
NRBC BLD-RTO: 0 PER 100 WBC
PLATELET # BLD AUTO: 230 K/UL (ref 150–400)
PMV BLD AUTO: 8.2 FL (ref 6.5–11.5)
POTASSIUM SERPL-SCNC: 3.8 MMOL/L (ref 3.5–5.1)
PROT SERPL-MCNC: 8.1 G/DL (ref 6.4–8.2)
RBC # BLD AUTO: 4.38 M/UL (ref 4.5–5.9)
SODIUM SERPL-SCNC: 142 MMOL/L (ref 136–145)
URATE SERPL-MCNC: 9.4 MG/DL (ref 3.5–7.2)
WBC # BLD AUTO: 7.8 K/UL (ref 4.4–11.3)

## 2021-05-04 PROCEDURE — 99284 EMERGENCY DEPT VISIT MOD MDM: CPT

## 2021-05-04 PROCEDURE — 84550 ASSAY OF BLOOD/URIC ACID: CPT

## 2021-05-04 PROCEDURE — 36415 COLL VENOUS BLD VENIPUNCTURE: CPT

## 2021-05-04 PROCEDURE — 96372 THER/PROPH/DIAG INJ SC/IM: CPT

## 2021-05-04 PROCEDURE — 74011250636 HC RX REV CODE- 250/636: Performed by: EMERGENCY MEDICINE

## 2021-05-04 PROCEDURE — 85025 COMPLETE CBC W/AUTO DIFF WBC: CPT

## 2021-05-04 PROCEDURE — 74011250637 HC RX REV CODE- 250/637: Performed by: EMERGENCY MEDICINE

## 2021-05-04 PROCEDURE — 80053 COMPREHEN METABOLIC PANEL: CPT

## 2021-05-04 RX ORDER — KETOROLAC TROMETHAMINE 30 MG/ML
60 INJECTION, SOLUTION INTRAMUSCULAR; INTRAVENOUS
Status: COMPLETED | OUTPATIENT
Start: 2021-05-04 | End: 2021-05-04

## 2021-05-04 RX ORDER — OXYCODONE AND ACETAMINOPHEN 5; 325 MG/1; MG/1
1 TABLET ORAL
Qty: 20 TAB | Refills: 0 | Status: SHIPPED | OUTPATIENT
Start: 2021-05-04 | End: 2021-05-09

## 2021-05-04 RX ORDER — OXYCODONE AND ACETAMINOPHEN 5; 325 MG/1; MG/1
2 TABLET ORAL
Status: COMPLETED | OUTPATIENT
Start: 2021-05-04 | End: 2021-05-04

## 2021-05-04 RX ORDER — COLCHICINE 0.6 MG/1
0.6 TABLET ORAL
Status: COMPLETED | OUTPATIENT
Start: 2021-05-04 | End: 2021-05-05

## 2021-05-04 RX ORDER — COLCHICINE 0.6 MG/1
0.6 TABLET ORAL 2 TIMES DAILY
Qty: 20 TAB | Refills: 0 | Status: SHIPPED | OUTPATIENT
Start: 2021-05-04

## 2021-05-04 RX ADMIN — KETOROLAC TROMETHAMINE 60 MG: 30 INJECTION, SOLUTION INTRAMUSCULAR at 22:48

## 2021-05-04 RX ADMIN — SODIUM CHLORIDE 500 ML: 9 INJECTION, SOLUTION INTRAVENOUS at 22:50

## 2021-05-04 RX ADMIN — OXYCODONE AND ACETAMINOPHEN 2 TABLET: 5; 325 TABLET ORAL at 22:48

## 2021-05-05 PROCEDURE — 74011250637 HC RX REV CODE- 250/637: Performed by: EMERGENCY MEDICINE

## 2021-05-05 RX ADMIN — COLCHICINE 0.6 MG: 0.6 TABLET, FILM COATED ORAL at 00:06

## 2021-05-05 NOTE — ED PROVIDER NOTES
Patient with a history of back pain and S/P abdominal surgeries for bowel obstruction , presents with pain in left knee hands and arms, starting 3 days ago. Pain is constant,  worse with movement or palpation. No fever or chills. No recent trauma.      Duration:  3 days    Intensity: severe    Modified by: movement or palpation               Past Medical History:   Diagnosis Date    Arthritis     Bowel obstruction (HCC)     High cholesterol     Hx of abdominal surgery     for SBO    Hypertension        Past Surgical History:   Procedure Laterality Date    HX BREAST LUMPECTOMY Left     [place] CHEST SURGERY PROCEDURE UNLISTED      open heart surgery         Family History:   Problem Relation Age of Onset    Hypertension Mother     Breast Cancer Mother     No Known Problems Father     Hypertension Other        Social History     Socioeconomic History    Marital status:      Spouse name: Not on file    Number of children: Not on file    Years of education: Not on file    Highest education level: Not on file   Occupational History    Not on file   Social Needs    Financial resource strain: Not on file    Food insecurity     Worry: Not on file     Inability: Not on file    Transportation needs     Medical: Not on file     Non-medical: Not on file   Tobacco Use    Smoking status: Former Smoker     Packs/day: 0.50     Years: 0.50     Pack years: 0.25     Quit date: 2020     Years since quittin.4    Smokeless tobacco: Never Used   Substance and Sexual Activity    Alcohol use: Never     Frequency: Never    Drug use: Never    Sexual activity: Not Currently   Lifestyle    Physical activity     Days per week: Not on file     Minutes per session: Not on file    Stress: Not on file   Relationships    Social connections     Talks on phone: Not on file     Gets together: Not on file     Attends [employer] service: Not on file     Active member of club or organization: Not on file     Attends meetings of clubs or organizations: Not on file     Relationship status: Not on file    Intimate partner violence     Fear of current or ex partner: Not on file     Emotionally abused: Not on file     Physically abused: Not on file     Forced sexual activity: Not on file   Other Topics Concern    Not on file   Social History Narrative    Not on file         ALLERGIES: Patient has no known allergies. Review of Systems   Constitutional: Negative. Negative for diaphoresis, fatigue and fever. HENT: Negative. Eyes: Negative. Respiratory: Negative. Negative for chest tightness and shortness of breath. Cardiovascular: Negative. Negative for chest pain. Endocrine: Negative. Genitourinary: Negative. Musculoskeletal: Positive for arthralgias, back pain, joint swelling and myalgias. Skin: Negative. Allergic/Immunologic: Negative. Neurological: Negative. Hematological: Negative. Psychiatric/Behavioral: Negative. Vitals:    05/04/21 2224 05/04/21 2236   BP: (!) 173/149 135/83   Pulse: 83    Resp: 18    Temp: 98.5 °F (36.9 °C)    SpO2: 94%    Weight: 113.4 kg (250 lb)    Height: 5' 9\" (1.753 m)             Physical Exam  Vitals signs and nursing note reviewed. HENT:      Head: Normocephalic and atraumatic. Neck:      Musculoskeletal: Normal range of motion and neck supple. Cardiovascular:      Rate and Rhythm: Normal rate and regular rhythm. Pulses: Normal pulses. Heart sounds: Normal heart sounds. Pulmonary:      Effort: Pulmonary effort is normal.      Breath sounds: Normal breath sounds. Abdominal:      General: Abdomen is flat. Bowel sounds are normal.      Palpations: Abdomen is soft. Musculoskeletal:         General: Swelling and tenderness present. Comments: Hands with moderate swelling and tenderness. Diffuse surgical scars in back    Skin:     General: Skin is warm and dry. Neurological:      General: No focal deficit present.       Mental Status: He is oriented to person, place, and time. Mental status is at baseline.    Psychiatric:         Mood and Affect: Mood normal.         Behavior: Behavior normal.          MDM       Procedures

## 2021-05-05 NOTE — ED TRIAGE NOTES
Pt arrives via ems c/o pain \"all over\" x 3days. Hx back injury and hypertensions. Denies trauma or reinjury. Neuro intact, denies chest pain, shortness of breath.

## 2021-05-12 ENCOUNTER — OFFICE VISIT (OUTPATIENT)
Dept: ORTHOPEDIC SURGERY | Age: 76
End: 2021-05-12
Payer: MEDICARE

## 2021-05-12 DIAGNOSIS — M17.11 OSTEOARTHRITIS OF RIGHT KNEE, UNSPECIFIED OSTEOARTHRITIS TYPE: ICD-10-CM

## 2021-05-12 DIAGNOSIS — M25.561 RIGHT KNEE PAIN, UNSPECIFIED CHRONICITY: Primary | ICD-10-CM

## 2021-05-12 PROCEDURE — G8417 CALC BMI ABV UP PARAM F/U: HCPCS | Performed by: ORTHOPAEDIC SURGERY

## 2021-05-12 PROCEDURE — 1101F PT FALLS ASSESS-DOCD LE1/YR: CPT | Performed by: ORTHOPAEDIC SURGERY

## 2021-05-12 PROCEDURE — 20611 DRAIN/INJ JOINT/BURSA W/US: CPT | Performed by: ORTHOPAEDIC SURGERY

## 2021-05-12 PROCEDURE — G8536 NO DOC ELDER MAL SCRN: HCPCS | Performed by: ORTHOPAEDIC SURGERY

## 2021-05-12 PROCEDURE — G8432 DEP SCR NOT DOC, RNG: HCPCS | Performed by: ORTHOPAEDIC SURGERY

## 2021-05-12 PROCEDURE — G8427 DOCREV CUR MEDS BY ELIG CLIN: HCPCS | Performed by: ORTHOPAEDIC SURGERY

## 2021-05-12 PROCEDURE — 3017F COLORECTAL CA SCREEN DOC REV: CPT | Performed by: ORTHOPAEDIC SURGERY

## 2021-05-12 PROCEDURE — 99214 OFFICE O/P EST MOD 30 MIN: CPT | Performed by: ORTHOPAEDIC SURGERY

## 2021-05-12 RX ORDER — CLOPIDOGREL BISULFATE 75 MG/1
TABLET ORAL
COMMUNITY
Start: 2021-05-06 | End: 2022-06-17

## 2021-05-12 RX ORDER — TRIAMCINOLONE ACETONIDE 40 MG/ML
40 INJECTION, SUSPENSION INTRA-ARTICULAR; INTRAMUSCULAR ONCE
Status: COMPLETED | OUTPATIENT
Start: 2021-05-12 | End: 2021-05-12

## 2021-05-12 RX ORDER — LIDOCAINE HYDROCHLORIDE 10 MG/ML
9 INJECTION INFILTRATION; PERINEURAL ONCE
Status: COMPLETED | OUTPATIENT
Start: 2021-05-12 | End: 2021-05-12

## 2021-05-12 RX ORDER — NITROGLYCERIN 80 MG/1
PATCH TRANSDERMAL
COMMUNITY
Start: 2021-05-03

## 2021-05-12 RX ADMIN — TRIAMCINOLONE ACETONIDE 40 MG: 40 INJECTION, SUSPENSION INTRA-ARTICULAR; INTRAMUSCULAR at 15:03

## 2021-05-12 RX ADMIN — LIDOCAINE HYDROCHLORIDE 9 ML: 10 INJECTION INFILTRATION; PERINEURAL at 15:03

## 2021-05-12 NOTE — PROGRESS NOTES
Name: Eddie Saha    : 1945     Service Dept: [address] [place] [address] and Sports Medicine    Patient's Pharmacies:    [facility]/pharmacy #[id number] - Danyel Pinedo  [address] [place] [address] [place] [id number]  Phone: [phone] Fax: [phone]       Chief Complaint   Patient presents with    Shoulder Pain        There were no vitals taken for this visit. No Known Allergies   Current Outpatient Medications   Medication Sig Dispense Refill    clopidogreL (PLAVIX) 75 mg tab       Metamucil 0.52 gram capsule TAKE 1 CAPSULE BY ORAL ROUTE.  nitroglycerin (NITRODUR) 0.4 mg/hr       colchicine 0.6 mg tablet Take 1 Tab by mouth two (2) times a day. 20 Tab 0    aspirin 81 mg chewable tablet Take 1 Tab by mouth daily. 30 Tab 2    famotidine (PEPCID) 20 mg tablet Take 1 Tab by mouth nightly. 30 Tab 1    traZODone (DESYREL) 50 mg tablet Take 50 mg by mouth nightly as needed for Insomnia.  nitroglycerin (NITROLINGUAL) 400 mcg/spray spray 1 Spray by SubLINGual route every five (5) minutes as needed for Chest Pain.  furosemide (LASIX) 40 mg tablet Take  by mouth daily.  naproxen (NAPROSYN) 500 mg tablet Take 500 mg by mouth two (2) times daily (with meals).  olmesartan (BENICAR) 20 mg tablet Take 20 mg by mouth daily.  atorvastatin (LIPITOR) 40 mg tablet Take  by mouth daily.  metoprolol tartrate (LOPRESSOR) 100 mg IR tablet Take  by mouth two (2) times a day.  isosorbide mononitrate ER (IMDUR) 60 mg CR tablet Take  by mouth every morning.         Patient Active Problem List   Diagnosis Code    Small bowel obstruction (HCC) K56.609    Rectal bleeding K62.5    Abdominal pain R10.9    Constipation K59.00    Diverticulitis K57.92      Family History   Problem Relation Age of Onset    Hypertension Mother     Breast Cancer Mother     No Known Problems Father     Hypertension Other       Social History Socioeconomic History    Marital status:      Spouse name: Not on file    Number of children: Not on file    Years of education: Not on file    Highest education level: Not on file   Tobacco Use    Smoking status: Former Smoker     Packs/day: 0.50     Years: 0.50     Pack years: 0.25     Quit date: 2020     Years since quittin.4    Smokeless tobacco: Never Used   Substance and Sexual Activity    Alcohol use: Never     Frequency: Never    Drug use: Never    Sexual activity: Not Currently      Past Surgical History:   Procedure Laterality Date    HX BREAST LUMPECTOMY Left     [place] CHEST SURGERY PROCEDURE UNLISTED      open heart surgery      Past Medical History:   Diagnosis Date    Arthritis     Bowel obstruction ([facility] [address]     High cholesterol     Hx of abdominal surgery     for SBO    Hypertension         I have reviewed and agree with [address] [place] [address] Nw and ROS and intake form in chart and the record furthermore I have reviewed prior medical record(s) regarding this patients care during this appointment. Review of Systems:   Patient is a pleasant appearing individual, appropriately dressed, well hydrated, well nourished, who is alert, appropriately oriented for age, and in no acute distress with a normal gait and normal affect who does not appear to be in any significant pain.    Physical Exam:  Right Knee -Decrease range of motion with flexion, Knee arc of greater than 50 degrees, Some crepitation, Grossly neurovascularly intact, Good cap refill, No skin lesion, Moderate swelling, No gross instability, Some quadriceps weakness, Kellgren and González at least grade 3    Left Knee - Full Range of Motion, No crepitation, Grossly neurovascularly intact, Good cap refill, No skin lesion, No swelling, No gross instability, No quadriceps weakness    Procedure Documentation:    I discussed in detail the risks, benefits and complications of an injection which included but are not limited to infection, skin reactions, hot swollen joint, and anaphylaxis with the patient. The patient verbalized understanding and gave informed consent for the injection. The patient's knee was flexed to 90° and the skin prepped using sterile alcohol solution. A sterile needle was inserted into the right knee and the mixture of 9 mL Lidocaine 1%, 1 mL Kenalog 40 mg was injected under sterile technique. The needle was withdrawn and the puncture site sealed with a Band-Aid. Technique: Under sterile conditions a [employer] ultrasound unit with a variable frequency (7.0-14.0 MHz) linear transducer was used to localize the placement of needle into the right knee joint. Findings: Successful needle placement for knee injection. Final images were taken and saved for permanent record. The patient tolerated the injection well. The patient was instructed to call the office immediately if there is any pain, redness, warmth, fever, or chills. Encounter Diagnoses     ICD-10-CM ICD-9-CM   1. Right knee pain, unspecified chronicity  M25.561 719.46   2. Osteoarthritis of right knee, unspecified osteoarthritis type  M17.11 715.96       HPI:  The patient is here with a chief complaint of right knee pain, been having it for a while now, progressively getting worse. Pain is 8/10. Assessment/Plan:  1. Right knee arthritic flare, severe in nature. Plan at this point, cortisone injection. See the patient back in 1 week for routine followup. As part of continued conservative pain management options the patient was advised to utilize Tylenol or OTC NSAIDS as long as it is not medically contraindicated. Return to Office: Follow-up and Dispositions    · Return in about 1 week (around 5/19/2021).         Administrations This Visit     lidocaine (XYLOCAINE) 10 mg/mL (1 %) injection 9 mL     Admin Date  05/12/2021 Action  Given Dose  9 mL Route  Other Administered By  Pamela Rodriguez LPN          triamcinolone acetonide (KENALOG-40) 40 mg/mL injection 40 mg     Admin Date  05/12/2021 Action  Given Dose  40 mg Route  Intra artICUlar Administered By  Kayla Barker LPN               Scribed by Charissa Guzman LPN as dictated by [facility] ABDIAZIZ Odom MD.  Documentation True and Accepted Jaswant Odom MD

## 2021-05-12 NOTE — PATIENT INSTRUCTIONS
Knee Pain or Injury: Care Instructions  Your Care Instructions     Injuries are a common cause of knee problems. Sudden (acute) injuries may be caused by a direct blow to the knee. They can also be caused by abnormal twisting, bending, or falling on the knee. Pain, bruising, or swelling may be severe, and may start within minutes of the injury. Overuse is another cause of knee pain. Other causes are climbing stairs, kneeling, and other activities that use the knee. Everyday wear and tear, especially as you get older, also can cause knee pain. Rest, along with home treatment, often relieves pain and allows your knee to heal. If you have a serious knee injury, you may need tests and treatment. Follow-up care is a key part of your treatment and safety. Be sure to make and go to all appointments, and call your doctor if you are having problems. It's also a good idea to know your test results and keep a list of the medicines you take. How can you care for yourself at home? · Be safe with medicines. Read and follow all instructions on the label. ? If the doctor gave you a prescription medicine for pain, take it as prescribed. ? If you are not taking a prescription pain medicine, ask your doctor if you can take an over-the-counter medicine. · Rest and protect your knee. Take a break from any activity that may cause pain. · Put ice or a cold pack on your knee for 10 to 20 minutes at a time. Put a thin cloth between the ice and your skin. · Prop up a sore knee on a pillow when you ice it or anytime you sit or lie down for the next 3 days. Try to keep it above the level of your heart. This will help reduce swelling. · If your knee is not swollen, you can put moist heat, a heating pad, or a warm cloth on your knee. · If your doctor recommends an elastic bandage, sleeve, or other type of support for your knee, wear it as directed.   · Follow your doctor's instructions about how much weight you can put on your leg. Use a cane, crutches, or a walker as instructed. · Follow your doctor's instructions about activity during your healing process. If you can do mild exercise, slowly increase your activity. · Reach and stay at a healthy weight. Extra weight can strain the joints, especially the knees and hips, and make the pain worse. Losing even a few pounds may help. When should you call for help? Call 911 anytime you think you may need emergency care. For example, call if:    · You have symptoms of a blood clot in your lung (called a pulmonary embolism). These may include:  ? Sudden chest pain. ? Trouble breathing. ? Coughing up blood. Call your doctor now or seek immediate medical care if:    · You have severe or increasing pain.     · Your leg or foot turns cold or changes color.     · You cannot stand or put weight on your knee.     · Your knee looks twisted or bent out of shape.     · You cannot move your knee.     · You have signs of infection, such as:  ? Increased pain, swelling, warmth, or redness. ? Red streaks leading from the knee. ? Pus draining from a place on your knee. ? A fever.     · You have signs of a blood clot in your leg (called a deep vein thrombosis), such as:  ? Pain in your calf, back of the knee, thigh, or groin. ? Redness and swelling in your leg or groin. Watch closely for changes in your health, and be sure to contact your doctor if:    · You have tingling, weakness, or numbness in your knee.     · You have any new symptoms, such as swelling.     · You have bruises from a knee injury that last longer than 2 weeks.     · You do not get better as expected. Where can you learn more? Go to http://www.gray.com/  Enter K195 in the search box to learn more about \"Knee Pain or Injury: Care Instructions. \"  Current as of: February 26, 2020               Content Version: 12.8  © 3197-9124 [employer].    Care instructions adapted under license by [employer] (which disclaims liability or warranty for this information). If you have questions about a medical condition or this instruction, always ask your healthcare professional. [address] any warranty or liability for your use of this information.

## 2021-05-12 NOTE — LETTER
Anel Showers 1945  
[id number] 5/12/2021 I hereby authorize and direct Jaswant Odom MD, Gina Boyd, and whomever he may designate as his associate to perform upon myself the following procedure: 
 
Injection of: Kenalog, Supartz, Euflexxa, Orthovisc in the Right/Left ____________________. If any unforeseen condition arises in the course of the procedure, I further authorize him and his associated and/or assistant(s) to do whatever he/she deems advisable. The nature, purpose, benefits, risks, side effects, likelihood of achieving goals, and potential problems that might occur during recuperation, risks for not receiving the proposed care, treatment and services and alternatives of the procedure have been fully explained to me by my physician including, but not limited to: 
 
Swelling, joint pain, skin pigment changes, worsening of condition, and failure to improve. I acknowledge that no guarantee or assurance has been made to me as to the results that may be obtained or the likelihood of success. _______________________________________ Signature of patient or authorized representative [employer] and Sports Medicine fax: [phone]

## 2022-02-25 ENCOUNTER — TELEPHONE (OUTPATIENT)
Dept: ENT CLINIC | Age: 77
End: 2022-02-25

## 2022-02-28 ENCOUNTER — OFFICE VISIT (OUTPATIENT)
Dept: ENT CLINIC | Age: 77
End: 2022-02-28

## 2022-02-28 ENCOUNTER — OFFICE VISIT (OUTPATIENT)
Dept: ENT CLINIC | Age: 77
End: 2022-02-28
Payer: MEDICARE

## 2022-02-28 VITALS
SYSTOLIC BLOOD PRESSURE: 139 MMHG | DIASTOLIC BLOOD PRESSURE: 70 MMHG | RESPIRATION RATE: 16 BRPM | HEART RATE: 81 BPM | TEMPERATURE: 97.5 F | HEIGHT: 69 IN | BODY MASS INDEX: 35.4 KG/M2 | WEIGHT: 239 LBS | OXYGEN SATURATION: 98 %

## 2022-02-28 DIAGNOSIS — H90.3 SENSORINEURAL HEARING LOSS (SNHL) OF BOTH EARS: Primary | ICD-10-CM

## 2022-02-28 DIAGNOSIS — H93.13 TINNITUS OF BOTH EARS: ICD-10-CM

## 2022-02-28 PROCEDURE — G8417 CALC BMI ABV UP PARAM F/U: HCPCS | Performed by: NURSE PRACTITIONER

## 2022-02-28 PROCEDURE — G8427 DOCREV CUR MEDS BY ELIG CLIN: HCPCS | Performed by: NURSE PRACTITIONER

## 2022-02-28 PROCEDURE — 99204 OFFICE O/P NEW MOD 45 MIN: CPT | Performed by: NURSE PRACTITIONER

## 2022-02-28 PROCEDURE — 92557 COMPREHENSIVE HEARING TEST: CPT | Performed by: AUDIOLOGIST

## 2022-02-28 PROCEDURE — 1101F PT FALLS ASSESS-DOCD LE1/YR: CPT | Performed by: NURSE PRACTITIONER

## 2022-02-28 PROCEDURE — G8536 NO DOC ELDER MAL SCRN: HCPCS | Performed by: NURSE PRACTITIONER

## 2022-02-28 PROCEDURE — G8432 DEP SCR NOT DOC, RNG: HCPCS | Performed by: NURSE PRACTITIONER

## 2022-02-28 PROCEDURE — 92567 TYMPANOMETRY: CPT | Performed by: AUDIOLOGIST

## 2022-02-28 PROCEDURE — 3017F COLORECTAL CA SCREEN DOC REV: CPT | Performed by: NURSE PRACTITIONER

## 2022-02-28 RX ORDER — MUPIROCIN 20 MG/G
OINTMENT TOPICAL 2 TIMES DAILY
Qty: 22 G | Refills: 0 | Status: SHIPPED | OUTPATIENT
Start: 2022-02-28 | End: 2022-03-07

## 2022-02-28 NOTE — PROGRESS NOTES
Visit Vitals  /70 (BP 1 Location: Left upper arm, BP Patient Position: Sitting, BP Cuff Size: Large adult)   Pulse 81   Temp 97.5 °F (36.4 °C) (Temporal)   Resp 16   Ht 5' 9\" (1.753 m)   Wt 239 lb (108.4 kg)   SpO2 98%   BMI 35.29 kg/m²   No chief complaint on file.

## 2022-02-28 NOTE — PROGRESS NOTES
Otolaryngology-Head and Neck Surgery  New Patient Visit     Patient: Sharda Márquez  YOB: 1945  MRN: [id number]  Date of Service: 2/28/2022    Chief Complaint: Bilateral HL    History of Present Illness: Sharda Márquez is a 77y.o. 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, farm work    No ENT surgical HX  Paternal niece with HX of thyroid CA, sinus surgery    Past Medical History:  Past Medical History:   Diagnosis Date    Arthritis     Bowel obstruction ([address]     High cholesterol     Hx of abdominal surgery     for SBO    Hypertension        Past Surgical History:   Past Surgical History:   Procedure Laterality Date    HX BREAST LUMPECTOMY Left     [place] CHEST SURGERY PROCEDURE UNLISTED      open heart surgery       Medications:   Current Outpatient Medications   Medication Instructions    aspirin 81 mg, Oral, DAILY    atorvastatin (LIPITOR) 40 mg tablet Oral, DAILY    clopidogreL (PLAVIX) 75 mg tab No dose, route, or frequency recorded.  colchicine 0.6 mg, Oral, 2 TIMES DAILY    famotidine (PEPCID) 20 mg, Oral, EVERY BEDTIME    furosemide (LASIX) 40 mg tablet Oral, DAILY    isosorbide mononitrate ER (IMDUR) 60 mg CR tablet Oral, 7AM    Metamucil 0.52 gram capsule TAKE 1 CAPSULE BY ORAL ROUTE.  metoprolol tartrate (LOPRESSOR) 100 mg IR tablet Oral, 2 TIMES DAILY    naproxen (NAPROSYN) 500 mg, Oral, 2 TIMES DAILY WITH MEALS    nitroglycerin (NITRODUR) 0.4 mg/hr No dose, route, or frequency recorded.        Allergies:   No Known Allergies    Social History:   Social History     Tobacco Use    Smoking status: Former Smoker     Packs/day: 0.50     Years: 0.50     Pack years: 0.25     Quit date: 12/9/2020     Years since quitting: 1. 2    Smokeless tobacco: Never Used   Substance Use Topics    Alcohol use: Never    Drug use: Never        Family History:  Family History   Problem Relation Age of Onset    Hypertension Mother     Breast Cancer Mother     No Known Problems Father     Hypertension Other        Review of Systems:    Consitutional: denies fever, excessive weight gain or loss. Eyes: denies diplopia, eye pain. Integumentary: denies new concerning skin lesions. Ears, Nose, Mouth, Throat: denies except as per HPI. Endocrine: denies hot or cold intolerance, increased thirst.  Respiratory: denies cough, hemoptysis, wheezing  Gastrointestinal: denies trouble swallowing, nausea, emesis, regurgitation  Musculoskeletal: denies muscle weakness or wasting  Cardiovascular: denies chest pain, shortness of breath  Neurologic: denies seizures, numbness or tingling, syncope  Hematologic: denies easy bleeding or bruising    Physical Examination:   Vitals:    02/28/22 0937   BP: 139/70   BP 1 Location: Left upper arm   BP Patient Position: Sitting   BP Cuff Size: Large adult   Pulse: 81   Temp: 97.5 °F (36.4 °C)   TempSrc: Temporal   Resp: 16   Height: 5' 9\" (1.753 m)   Weight: 239 lb (108.4 kg)   SpO2: 98%        General: Comfortable, pleasant, appears stated age  Voice: Strong, speaking in full sentences, no stridor    Face: No masses or lesions, facial strength symmetric   Ears: External ears unremarkable. Bilateral ear canal clear. Tympanic membrane clear and intact, with visible landmarks. Clear middle ear space. Left ear laceration noted at entrance of canal.  Cervantes: did not lateralize  Rinne: AC>BC in both ears  Nose: External nose unremarkable. Dorsum midline. Anterior rhinoscopy demonstrates no lesions. Septum midline. Turbinates without hypertrophy. Oral Cavity / Oropharynx: No trismus. Mucosa pink and moist. No lesions. Tongue is midline and mobile. Palate elevates symmetrically. Uvula midline. Tonsils unremarkable.  Base of tongue soft. Floor of mouth soft. Neck: Supple. No adenopathy. Thyroid unremarkable. Palpable laryngeal landmarks. Full neck range of motion   Neurologic: CN II - XI intact. Normal gait      Assessment and Plan:   1. Bilateral SNHL   2. Bilateral tinnitus       -Audiogram in office today revealed mild sloping to severe SNNHL in right ear and moderate sloping to moderate-severe in left. Results reviewed with patient and niece.  -Discussed etiologies of tinnitus. Handout given. -Return to office for hearing aid consultation.       [place] Spotted JUSTIN, FNP-C  [facility]  [address] [place] [address]  [facility]  Office Phone: [phone]

## 2022-02-28 NOTE — PROGRESS NOTES
Neri Morgan, a 77y.o. year old male, was seen in clinic today for a hearing evaluation on referral from Sandrita Matias NP. Patient complains of hearing loss, bilaterally with occasional tinnitus. He has a history of occupational noise exposure without use of hearing protection. He denies dizziness/unsteadiness. He also reports occasional sharp ear pain (\"feels like something hard and caked on there\"). No recent audiogram.      Otoscopy: normal external ear canals and visible tympanic membranes, bilaterally. *Noted scab near opening of left ear canal    Tympanometry: RE unable to obtain  LE Type A, normal    SRT: RE Speech Reception Threshold (SRT) was obtained at 60 dBHL LE Speech Reception Threshold (SRT) was obtained at 60 dBHL    WRS: RE Good in quiet when words were presented at 95 dBHL. *35 dB SL per patient comfort level  WRS: LE Excellent in quiet when words were presented at 90 dBHL. *30 dB SL per patient comfort level    Pure tone audiometry:  RE: Mild sloping to severe sensorineural hearing loss  LE: Moderate sloping to moderate-severe sensorineural hearing loss    Sensorineural hearing loss, bilaterally    Impressions:  hearing loss requiring medical/otologic and audiologic follow-up  Discussed results of today's testing with patient. Patient is a good candidate for amplification. Discussed benefits of hearing aids and recommended hearing aid evaluation to discuss options. Patient indicated understanding. Provided hearing aid evaluation handout today. Plan:  Follow-up with NP/ENT.   Repeat audiogram upon request.  Hearing aid evaluation upon request.    Hieu Bernard   Doctor of Audiology

## 2022-03-16 ENCOUNTER — TELEPHONE (OUTPATIENT)
Dept: ENT CLINIC | Age: 77
End: 2022-03-16

## 2022-03-16 NOTE — TELEPHONE ENCOUNTER
Called patient to discuss hearing aid insurance benefits prior to hearing aid eval on 3-. Patient has a $3600 benefit through the third-party [address] [facility]. Did advise patient that he is unable to use his hearing aid benefit at our office; however he can call and find a participating provider in his area. Provided patient with [facility] phone number. Patient is aware he can either keep his hearing aid evaluation appointment at this office or cancel it if he wishes.

## 2022-03-18 PROBLEM — K59.00 CONSTIPATION: Status: ACTIVE | Noted: 2021-03-05

## 2022-03-19 PROBLEM — R10.9 ABDOMINAL PAIN: Status: ACTIVE | Noted: 2021-03-05

## 2022-03-19 PROBLEM — K56.609 SMALL BOWEL OBSTRUCTION (HCC): Status: ACTIVE | Noted: 2021-02-25

## 2022-03-19 PROBLEM — K62.5 RECTAL BLEEDING: Status: ACTIVE | Noted: 2021-03-05

## 2022-03-19 PROBLEM — K57.92 DIVERTICULITIS: Status: ACTIVE | Noted: 2021-03-09

## 2022-06-15 ENCOUNTER — APPOINTMENT (OUTPATIENT)
Dept: GENERAL RADIOLOGY | Age: 77
End: 2022-06-15
Attending: EMERGENCY MEDICINE
Payer: MEDICARE

## 2022-06-15 ENCOUNTER — HOSPITAL ENCOUNTER (OUTPATIENT)
Age: 77
Setting detail: OBSERVATION
Discharge: HOME HEALTH | End: 2022-06-17
Attending: EMERGENCY MEDICINE | Admitting: INTERNAL MEDICINE
Payer: MEDICARE

## 2022-06-15 ENCOUNTER — APPOINTMENT (OUTPATIENT)
Dept: CT IMAGING | Age: 77
End: 2022-06-15
Attending: EMERGENCY MEDICINE
Payer: MEDICARE

## 2022-06-15 DIAGNOSIS — K59.00 CONSTIPATION, UNSPECIFIED CONSTIPATION TYPE: ICD-10-CM

## 2022-06-15 DIAGNOSIS — R73.9 HYPERGLYCEMIA: Primary | ICD-10-CM

## 2022-06-15 LAB
ALBUMIN SERPL-MCNC: 3.6 G/DL (ref 3.5–5)
ALBUMIN/GLOB SERPL: 1.2 {RATIO} (ref 1.1–2.2)
ALP SERPL-CCNC: 127 U/L (ref 45–117)
ALT SERPL-CCNC: 69 U/L (ref 12–78)
ANION GAP SERPL CALC-SCNC: 8 MMOL/L (ref 5–15)
AST SERPL W P-5'-P-CCNC: 38 U/L (ref 15–37)
BASOPHILS # BLD: 0 K/UL (ref 0–0.1)
BASOPHILS NFR BLD: 1 % (ref 0–1)
BILIRUB SERPL-MCNC: 1.1 MG/DL (ref 0.2–1)
BUN SERPL-MCNC: 24 MG/DL (ref 6–20)
BUN/CREAT SERPL: 15 (ref 12–20)
CA-I BLD-MCNC: 9.3 MG/DL (ref 8.5–10.1)
CHLORIDE SERPL-SCNC: 91 MMOL/L (ref 97–108)
CO2 SERPL-SCNC: 29 MMOL/L (ref 21–32)
CREAT SERPL-MCNC: 1.56 MG/DL (ref 0.7–1.3)
DEPRECATED S PYO AG THROAT QL EIA: NEGATIVE
DIFFERENTIAL METHOD BLD: ABNORMAL
EOSINOPHIL # BLD: 0.1 K/UL (ref 0–0.4)
EOSINOPHIL NFR BLD: 2 % (ref 0–7)
ERYTHROCYTE [DISTWIDTH] IN BLOOD BY AUTOMATED COUNT: 13.8 % (ref 11.5–14.5)
GLOBULIN SER CALC-MCNC: 2.9 G/DL (ref 2–4)
GLUCOSE BLD STRIP.AUTO-MCNC: 441 MG/DL (ref 65–117)
GLUCOSE BLD STRIP.AUTO-MCNC: 484 MG/DL (ref 65–117)
GLUCOSE BLD STRIP.AUTO-MCNC: 514 MG/DL (ref 65–117)
GLUCOSE SERPL-MCNC: 669 MG/DL (ref 65–100)
HCT VFR BLD AUTO: 34.5 % (ref 36.6–50.3)
HGB BLD-MCNC: 11.5 G/DL (ref 12.1–17)
IMM GRANULOCYTES # BLD AUTO: 0 K/UL (ref 0–0.04)
IMM GRANULOCYTES NFR BLD AUTO: 0 % (ref 0–0.5)
LYMPHOCYTES # BLD: 1.2 K/UL (ref 0.8–3.5)
LYMPHOCYTES NFR BLD: 23 % (ref 12–49)
MCH RBC QN AUTO: 29.3 PG (ref 26–34)
MCHC RBC AUTO-ENTMCNC: 33.3 G/DL (ref 30–36.5)
MCV RBC AUTO: 87.8 FL (ref 80–99)
MONOCYTES # BLD: 0.2 K/UL (ref 0–1)
MONOCYTES NFR BLD: 5 % (ref 5–13)
NEUTS SEG # BLD: 3.5 K/UL (ref 1.8–8)
NEUTS SEG NFR BLD: 69 % (ref 32–75)
PERFORMED BY, TECHID: ABNORMAL
PLATELET # BLD AUTO: 220 K/UL (ref 150–400)
PMV BLD AUTO: 11.6 FL (ref 8.9–12.9)
POTASSIUM SERPL-SCNC: 4.1 MMOL/L (ref 3.5–5.1)
PROT SERPL-MCNC: 6.5 G/DL (ref 6.4–8.2)
RBC # BLD AUTO: 3.93 M/UL (ref 4.1–5.7)
SODIUM SERPL-SCNC: 128 MMOL/L (ref 136–145)
TROPONIN-HIGH SENSITIVITY: 33 NG/L (ref 0–76)
WBC # BLD AUTO: 5 K/UL (ref 4.1–11.1)

## 2022-06-15 PROCEDURE — 96361 HYDRATE IV INFUSION ADD-ON: CPT

## 2022-06-15 PROCEDURE — 74011250636 HC RX REV CODE- 250/636: Performed by: INTERNAL MEDICINE

## 2022-06-15 PROCEDURE — 80053 COMPREHEN METABOLIC PANEL: CPT

## 2022-06-15 PROCEDURE — 74011636637 HC RX REV CODE- 636/637: Performed by: EMERGENCY MEDICINE

## 2022-06-15 PROCEDURE — 74011636637 HC RX REV CODE- 636/637: Performed by: INTERNAL MEDICINE

## 2022-06-15 PROCEDURE — 74018 RADEX ABDOMEN 1 VIEW: CPT

## 2022-06-15 PROCEDURE — 74011250637 HC RX REV CODE- 250/637: Performed by: EMERGENCY MEDICINE

## 2022-06-15 PROCEDURE — 87880 STREP A ASSAY W/OPTIC: CPT

## 2022-06-15 PROCEDURE — 84484 ASSAY OF TROPONIN QUANT: CPT

## 2022-06-15 PROCEDURE — 96376 TX/PRO/DX INJ SAME DRUG ADON: CPT

## 2022-06-15 PROCEDURE — 85025 COMPLETE CBC W/AUTO DIFF WBC: CPT

## 2022-06-15 PROCEDURE — 82962 GLUCOSE BLOOD TEST: CPT

## 2022-06-15 PROCEDURE — G0378 HOSPITAL OBSERVATION PER HR: HCPCS

## 2022-06-15 PROCEDURE — 74011250636 HC RX REV CODE- 250/636: Performed by: EMERGENCY MEDICINE

## 2022-06-15 PROCEDURE — 99285 EMERGENCY DEPT VISIT HI MDM: CPT

## 2022-06-15 PROCEDURE — 71045 X-RAY EXAM CHEST 1 VIEW: CPT

## 2022-06-15 PROCEDURE — 74176 CT ABD & PELVIS W/O CONTRAST: CPT

## 2022-06-15 PROCEDURE — 74011000250 HC RX REV CODE- 250: Performed by: INTERNAL MEDICINE

## 2022-06-15 PROCEDURE — 96374 THER/PROPH/DIAG INJ IV PUSH: CPT

## 2022-06-15 PROCEDURE — 74011250637 HC RX REV CODE- 250/637: Performed by: INTERNAL MEDICINE

## 2022-06-15 RX ORDER — CLOPIDOGREL BISULFATE 75 MG/1
75 TABLET ORAL DAILY
Status: DISCONTINUED | OUTPATIENT
Start: 2022-06-16 | End: 2022-06-17 | Stop reason: HOSPADM

## 2022-06-15 RX ORDER — GUAIFENESIN 100 MG/5ML
81 LIQUID (ML) ORAL DAILY
Status: DISCONTINUED | OUTPATIENT
Start: 2022-06-16 | End: 2022-06-17 | Stop reason: HOSPADM

## 2022-06-15 RX ORDER — INSULIN LISPRO 100 [IU]/ML
INJECTION, SOLUTION INTRAVENOUS; SUBCUTANEOUS EVERY 4 HOURS
Status: DISCONTINUED | OUTPATIENT
Start: 2022-06-15 | End: 2022-06-16

## 2022-06-15 RX ORDER — FAMOTIDINE 20 MG/1
20 TABLET, FILM COATED ORAL DAILY
Status: DISCONTINUED | OUTPATIENT
Start: 2022-06-16 | End: 2022-06-17 | Stop reason: HOSPADM

## 2022-06-15 RX ORDER — POLYETHYLENE GLYCOL 3350 17 G/17G
17 POWDER, FOR SOLUTION ORAL DAILY PRN
Status: DISCONTINUED | OUTPATIENT
Start: 2022-06-15 | End: 2022-06-17 | Stop reason: HOSPADM

## 2022-06-15 RX ORDER — ONDANSETRON 2 MG/ML
4 INJECTION INTRAMUSCULAR; INTRAVENOUS
Status: DISCONTINUED | OUTPATIENT
Start: 2022-06-15 | End: 2022-06-17 | Stop reason: HOSPADM

## 2022-06-15 RX ORDER — MAGNESIUM SULFATE 100 %
4 CRYSTALS MISCELLANEOUS AS NEEDED
Status: DISCONTINUED | OUTPATIENT
Start: 2022-06-15 | End: 2022-06-17 | Stop reason: HOSPADM

## 2022-06-15 RX ORDER — GABAPENTIN 300 MG/1
300 CAPSULE ORAL 3 TIMES DAILY
COMMUNITY

## 2022-06-15 RX ORDER — ONDANSETRON 4 MG/1
4 TABLET, ORALLY DISINTEGRATING ORAL
Status: DISCONTINUED | OUTPATIENT
Start: 2022-06-15 | End: 2022-06-17 | Stop reason: HOSPADM

## 2022-06-15 RX ORDER — ISOSORBIDE MONONITRATE 60 MG/1
60 TABLET, EXTENDED RELEASE ORAL DAILY
Status: DISCONTINUED | OUTPATIENT
Start: 2022-06-16 | End: 2022-06-17 | Stop reason: HOSPADM

## 2022-06-15 RX ORDER — METOPROLOL TARTRATE 50 MG/1
100 TABLET ORAL 2 TIMES DAILY
Status: DISCONTINUED | OUTPATIENT
Start: 2022-06-15 | End: 2022-06-17 | Stop reason: HOSPADM

## 2022-06-15 RX ORDER — ENOXAPARIN SODIUM 100 MG/ML
40 INJECTION SUBCUTANEOUS DAILY
Status: DISCONTINUED | OUTPATIENT
Start: 2022-06-16 | End: 2022-06-17 | Stop reason: HOSPADM

## 2022-06-15 RX ORDER — ACETAMINOPHEN 500 MG
1000 TABLET ORAL ONCE
Status: COMPLETED | OUTPATIENT
Start: 2022-06-15 | End: 2022-06-15

## 2022-06-15 RX ORDER — SODIUM CHLORIDE 0.9 % (FLUSH) 0.9 %
5-40 SYRINGE (ML) INJECTION EVERY 8 HOURS
Status: DISCONTINUED | OUTPATIENT
Start: 2022-06-15 | End: 2022-06-17 | Stop reason: HOSPADM

## 2022-06-15 RX ORDER — ACETAMINOPHEN 325 MG/1
650 TABLET ORAL
Status: DISCONTINUED | OUTPATIENT
Start: 2022-06-15 | End: 2022-06-17 | Stop reason: HOSPADM

## 2022-06-15 RX ORDER — ACETAMINOPHEN 650 MG/1
650 SUPPOSITORY RECTAL
Status: DISCONTINUED | OUTPATIENT
Start: 2022-06-15 | End: 2022-06-17 | Stop reason: HOSPADM

## 2022-06-15 RX ORDER — SODIUM CHLORIDE 9 MG/ML
150 INJECTION, SOLUTION INTRAVENOUS CONTINUOUS
Status: DISCONTINUED | OUTPATIENT
Start: 2022-06-15 | End: 2022-06-16

## 2022-06-15 RX ORDER — GABAPENTIN 300 MG/1
300 CAPSULE ORAL 3 TIMES DAILY
Status: DISCONTINUED | OUTPATIENT
Start: 2022-06-15 | End: 2022-06-17 | Stop reason: HOSPADM

## 2022-06-15 RX ORDER — DEXTROSE MONOHYDRATE 100 MG/ML
0-250 INJECTION, SOLUTION INTRAVENOUS AS NEEDED
Status: DISCONTINUED | OUTPATIENT
Start: 2022-06-15 | End: 2022-06-17 | Stop reason: HOSPADM

## 2022-06-15 RX ORDER — ATORVASTATIN CALCIUM 40 MG/1
40 TABLET, FILM COATED ORAL DAILY
Status: DISCONTINUED | OUTPATIENT
Start: 2022-06-16 | End: 2022-06-17 | Stop reason: HOSPADM

## 2022-06-15 RX ORDER — COLCHICINE 0.6 MG/1
0.6 TABLET ORAL 2 TIMES DAILY
Status: DISCONTINUED | OUTPATIENT
Start: 2022-06-15 | End: 2022-06-17 | Stop reason: HOSPADM

## 2022-06-15 RX ORDER — SODIUM CHLORIDE 0.9 % (FLUSH) 0.9 %
5-40 SYRINGE (ML) INJECTION AS NEEDED
Status: DISCONTINUED | OUTPATIENT
Start: 2022-06-15 | End: 2022-06-17 | Stop reason: HOSPADM

## 2022-06-15 RX ADMIN — INSULIN HUMAN 7 UNITS: 100 INJECTION, SOLUTION PARENTERAL at 13:04

## 2022-06-15 RX ADMIN — ACETAMINOPHEN 1000 MG: 500 TABLET ORAL at 11:08

## 2022-06-15 RX ADMIN — SODIUM CHLORIDE 1000 ML: 9 INJECTION, SOLUTION INTRAVENOUS at 13:03

## 2022-06-15 RX ADMIN — SODIUM CHLORIDE, PRESERVATIVE FREE 10 ML: 5 INJECTION INTRAVENOUS at 22:33

## 2022-06-15 RX ADMIN — SODIUM CHLORIDE 150 ML/HR: 9 INJECTION, SOLUTION INTRAVENOUS at 20:27

## 2022-06-15 RX ADMIN — GABAPENTIN 300 MG: 300 CAPSULE ORAL at 22:24

## 2022-06-15 RX ADMIN — INSULIN HUMAN 10 UNITS: 100 INJECTION, SOLUTION PARENTERAL at 14:57

## 2022-06-15 RX ADMIN — METOPROLOL TARTRATE 100 MG: 50 TABLET, FILM COATED ORAL at 20:37

## 2022-06-15 RX ADMIN — COLCHICINE 0.6 MG: 0.6 TABLET, FILM COATED ORAL at 20:37

## 2022-06-15 RX ADMIN — INSULIN LISPRO 15 UNITS: 100 INJECTION, SOLUTION INTRAVENOUS; SUBCUTANEOUS at 20:54

## 2022-06-15 RX ADMIN — SODIUM CHLORIDE 1000 ML: 9 INJECTION, SOLUTION INTRAVENOUS at 14:57

## 2022-06-15 NOTE — Clinical Note
Patient Class[de-identified] OBSERVATION [104]   Type of Bed: Telemetry [19]   Cardiac Monitoring Required?: No   Reason for Observation: hyperglycemia   Admitting Diagnosis: Hyperglycemia [150361]   Admitting Physician: Sagar Echavarria [[id number]]   Attending Physician: Ruth Badillo [[id number]]

## 2022-06-15 NOTE — ED TRIAGE NOTES
Pt has been having all over body aches and sore throat, cough and can only pass his bowels with a laxative.

## 2022-06-15 NOTE — H&P
History and Physical    Patient: Fidelina Castro MRN: [id number]  SSN: [id number]    YOB: 1945  Age: 77 y.o. Sex: male      Subjective:      Fidelina Castro is a 77 y.o. male with PMH of hypertension, hyperlipidemia, and arthritis. He presented to the ED with chief complaint of arm and head pain for the past month, as well as intermittent chest discomfort. Yesterday, also developed sore throat and body aches. Also positive for constipation, however denies vomiting. Lab work revealed no leukocytosis, however noted hyperglycemia and hyponatremia. Troponin negative. Strep throat negative. Chest x-ray unremarkable, abdominal x-ray showed constipation. On my evaluation, patient's symptoms has started to improve. Past Medical History:   Diagnosis Date    Arthritis     Bowel obstruction (HCC)     High cholesterol     Hx of abdominal surgery     for SBO    Hypertension      Past Surgical History:   Procedure Laterality Date    HX BREAST LUMPECTOMY Left     RI CHEST SURGERY PROCEDURE UNLISTED      open heart surgery      Family History   Problem Relation Age of Onset    Hypertension Mother     Breast Cancer Mother     No Known Problems Father     Hypertension Other      Social History     Tobacco Use    Smoking status: Former Smoker     Packs/day: 0.50     Years: 0.50     Pack years: 0.25     Quit date: 2020     Years since quittin.5    Smokeless tobacco: Never Used   Substance Use Topics    Alcohol use: Never      Prior to Admission medications    Medication Sig Start Date End Date Taking? Authorizing Provider   clopidogreL (PLAVIX) 75 mg tab  21   Provider, Historical   Metamucil 0.52 gram capsule TAKE 1 CAPSULE BY ORAL ROUTE. 3/24/21   Provider, Historical   nitroglycerin (NITRODUR) 0.4 mg/hr  5/3/21   Provider, Historical   colchicine 0.6 mg tablet Take 1 Tab by mouth two (2) times a day.  21   Kristan Boxer, MD   aspirin 81 mg chewable tablet Take 1 Tab by mouth daily. 3/9/21   Onur Knowles PA-C   famotidine (PEPCID) 20 mg tablet Take 1 Tab by mouth nightly. 3/1/21   Onur Knowles PA-C   furosemide (LASIX) 40 mg tablet Take  by mouth daily. Provider, Historical   naproxen (NAPROSYN) 500 mg tablet Take 500 mg by mouth two (2) times daily (with meals). Provider, Historical   atorvastatin (LIPITOR) 40 mg tablet Take  by mouth daily. Provider, Historical   metoprolol tartrate (LOPRESSOR) 100 mg IR tablet Take  by mouth two (2) times a day. Provider, Historical   isosorbide mononitrate ER (IMDUR) 60 mg CR tablet Take  by mouth every morning. Provider, Historical        No Known Allergies    Review of Systems:   Constitutional: See HPI  Eyes: No visual disturbance  Ears, Nose, Mouth, Throat, and Face: No nasal congestion, No sore throat  Respiratory: No cough, No sputum, No wheezing, No SOB  Cardiovascular: No chest pain, No lower extremity edema, No Palpitations   Gastrointestinal: See HPI  Genitourinary: No frequency, No dysuria, No hematuria  Integument/Breast: No rash, No skin lesion(s), No dryness  Musculoskeletal: No arthralgias, No neck pain, No back pain  Neurological: No headaches, No dizziness, No confusion,  No seizures  Behavioral/Psychiatric: No anxiety, No depression      Objective:     Vitals:    06/15/22 1024 06/15/22 1104 06/15/22 1646   BP:  116/65 122/67   Pulse:  90 64   Resp:  20 16   Temp:  98.3 °F (36.8 °C)    SpO2:  95% 100%   Weight: 106.1 kg (234 lb)     Height: 5' 9\" (1.753 m)          Physical Exam:   General: alert, cooperative, no distress  Eye: conjunctivae/corneas clear. PERRL, EOM's intact. Throat and Neck: normal and no erythema or exudates noted. No mass   Lung: clear to auscultation bilaterally  Heart: regular rate and rhythm,   Abdomen: soft, non-tender. Bowel sounds normal. No masses,  Extremities:  able to move all extremities normal, atraumatic  Skin: Normal.  Neurologic: AOx3.  Motor function and sensation grossly intact. Psychiatric: non focal    Recent Results (from the past 24 hour(s))   CBC WITH AUTOMATED DIFF    Collection Time: 06/15/22 11:00 AM   Result Value Ref Range    WBC 5.0 4.1 - 11.1 K/uL    RBC 3.93 (L) 4.10 - 5.70 M/uL    HGB 11.5 (L) 12.1 - 17.0 g/dL    HCT 34.5 (L) 36.6 - 50.3 %    MCV 87.8 80.0 - 99.0 FL    MCH 29.3 26.0 - 34.0 PG    MCHC 33.3 30.0 - 36.5 g/dL    RDW 13.8 11.5 - 14.5 %    PLATELET 450 771 - 408 K/uL    MPV 11.6 8.9 - 12.9 FL    NEUTROPHILS 69 32 - 75 %    LYMPHOCYTES 23 12 - 49 %    MONOCYTES 5 5 - 13 %    EOSINOPHILS 2 0 - 7 %    BASOPHILS 1 0 - 1 %    IMMATURE GRANULOCYTES 0 0.0 - 0.5 %    ABS. NEUTROPHILS 3.5 1.8 - 8.0 K/UL    ABS. LYMPHOCYTES 1.2 0.8 - 3.5 K/UL    ABS. MONOCYTES 0.2 0.0 - 1.0 K/UL    ABS. EOSINOPHILS 0.1 0.0 - 0.4 K/UL    ABS. BASOPHILS 0.0 0.0 - 0.1 K/UL    ABS. IMM. GRANS. 0.0 0.00 - 0.04 K/UL    DF AUTOMATED     METABOLIC PANEL, COMPREHENSIVE    Collection Time: 06/15/22 11:00 AM   Result Value Ref Range    Sodium 128 (L) 136 - 145 mmol/L    Potassium 4.1 3.5 - 5.1 mmol/L    Chloride 91 (L) 97 - 108 mmol/L    CO2 29 21 - 32 mmol/L    Anion gap 8 5 - 15 mmol/L    Glucose 669 (HH) 65 - 100 mg/dL    BUN 24 (H) 6 - 20 mg/dL    Creatinine 1.56 (H) 0.70 - 1.30 mg/dL    BUN/Creatinine ratio 15 12 - 20      GFR est AA 53 (L) >60 ml/min/1.73m2    GFR est non-AA 44 (L) >60 ml/min/1.73m2    Calcium 9.3 8.5 - 10.1 mg/dL    Bilirubin, total 1.1 (H) 0.2 - 1.0 mg/dL    AST (SGOT) 38 (H) 15 - 37 U/L    ALT (SGPT) 69 12 - 78 U/L    Alk.  phosphatase 127 (H) 45 - 117 U/L    Protein, total 6.5 6.4 - 8.2 g/dL    Albumin 3.6 3.5 - 5.0 g/dL    Globulin 2.9 2.0 - 4.0 g/dL    A-G Ratio 1.2 1.1 - 2.2     TROPONIN-HIGH SENSITIVITY    Collection Time: 06/15/22 11:00 AM   Result Value Ref Range    Troponin-High Sensitivity 33 0 - 76 ng/L   STREP AG SCREEN, GROUP A    Collection Time: 06/15/22 11:00 AM    Specimen: Throat   Result Value Ref Range    Group A Strep Ag ID Negative GLUCOSE, POC    Collection Time: 06/15/22  2:01 PM   Result Value Ref Range    Glucose (POC) 514 (H) 65 - 117 mg/dL    Performed by Pepper Miller    GLUCOSE, POC    Collection Time: 06/15/22  3:49 PM   Result Value Ref Range    Glucose (POC) 441 (H) 65 - 117 mg/dL    Performed by CALIN DOMINGUEZ        XR Results (maximum last 3): Results from [facility] encounter on 06/15/22    XR ABD (KUB)    Narrative  Abdomen, 2 views. Excess stool throughout colon/rectum. On submitted two-view study, no free intraperitoneal air evident. Sternal wires  related to prior intrathoracic surgery. XR CHEST PORT    Narrative  Chest single view. Comparison single view chest March 5, 2021. Unchanged appearance for the lungs; no interstitial or alveolar pulmonary edema. Sternal wires in the midline related to prior intrathoracic surgery. Cardiac and  mediastinal structures unchanged. No pneumothorax or sizable pleural effusion. Results from [facility] encounter on 03/05/21    XR CHEST PORT    Narrative  Chest single view. Comparison single view chest February 25, 2021    Unchanged appearance for the lungs; no gross interstitial or alveolar pulmonary  edema. Sternal wires in the midline related to prior intrathoracic surgery. Cardiac and mediastinal structures unchanged. No pneumothorax or sizable pleural  effusion. CT Results (maximum last 3): Results from [facility] encounter on 06/15/22    CT ABD PELV WO CONT    Narrative  CT abdomen and pelvis without IV contrast    Comparison CT abdomen and pelvis March 5, 2021. Axial images are reviewed along with reformatted sagittal/coronal images. No IV  contrast administered.   Dose reduction: All CT scans at this facility are performed using dose reduction  optimization techniques as appropriate to a performed exam including the  following-  automated exposure control, adjustments of mA and/or Kv according to patient  size, or use of iterative reconstructive technique. Lung bases are clear. No pleural effusion. Liver, spleen, pancreas, and bilateral adrenal glands appear unremarkable on  this nonenhanced study. Mild cortical thinning bilateral kidneys. No hydronephrosis. Stomach and small bowel loops are decompressed. Stool through colon. Sigmoid  colon diverticula. No CT evidence for appendicitis or diverticulitis. No  ascites. Mild uniform prostate enlargement. Bladder nondistended. Atherosclerotic change normal caliber abdominal aorta with extension to pelvic  arteries. Impression  Stool through colon. No bowel obstruction. No CT evidence for appendicitis or diverticulitis. No  ascites. Results from [facility] encounter on 03/05/21    CT ABD PELV WO CONT    Narrative  The study is a CT evaluation of the abdomen and pelvis dated 3/5/2021. History: Abdominal distention. Prior history of a small bowel obstruction. Technique: 5 mm axial imaging was acquired through the abdomen and pelvis  without intravenous contrast administration. Sagittal and coronal reconstructed  imaging is also submitted for interpretation. Dose Reduction Technique was employed to reduce radiation exposure - This  includes reduction optimization techniques as appropriate to a performed exam  with automated exposure control adjustments of the mA and/or Kv according to  patient size, or use of iterative reconstruction technique. LIMITATIONS: The absence of oral contrast opacification of the bowel. COMPARISON: CT evaluation of the abdomen and pelvis dated 2/27/2021. LIVER AND SPLEEN: There is a calcified granuloma within the posterior lateral  aspect of the right lobe. This examination is negative for masses of the liver  or of the spleen.     KIDNEYS: There is increased density within the renal collecting systems  apparently representing persistent excretion of contrast into the renal  collecting systems secondary to the patient's prior CT evaluation. There also  are left-sided intrarenal vascular calcifications. This examination is negative  for hydronephrosis or renal masses. PANCREAS AND ADRENAL GLANDS: Normal.    GALLBLADDER AND BILIARY TREE: Normal.    LOWER CHEST: There are coronary artery calcifications. The patient has  atelectatic changes within his lung bases more pronounced on the right compared  to left. The cardiac chambers are normal in size. There are bridging osteophytes  along the lower thoracic upper lumbar spine. VASCULARITY: There is diffuse calcifications along the wall of the abdominal  aorta and involving its branch vessels. There also are calcifications of the  common iliac arteries. BOWEL: There is a normal caliber to the stomach, the duodenum, the jejunum, and  the ileum. This examination is negative for an obstructive bowel gas pattern. The appendix is not definitively identified. This examination is negative for  specific findings to suggest appendicitis. There is diverticulosis of the left  side of the colon without active diverticulitis. OTHER: This examination is negative for intra-abdominal fluid or  lymphadenopathy. CT EVALUATION OF THE PELVIS: The prostate gland and seminal vesicles are normal  in size. The bladder is imaging in a normal fashion. There is diverticulosis of  the sigmoid colon. The rectum is unremarkable. OSSEOUS STRUCTURES:There is moderate to marked disc space narrowing from the  L3-L4 through the through the L5-S1 levels consistent with degenerative disc  disease. Impression  1. The small bowel is normal in caliber without a mechanical small bowel  obstruction. 2.  The patient did not receive intravenous contrast administration for this  examination. However, he did receive intravenous contrast on 2/27/2021. There is  persistent excretion of contrast into the renal collecting systems.  I would  recommend correlation with the patient's creatinine levels to determine the  significance of the persistent delayed excretion of contrast into the renal  collecting systems to help in exclusion against acute tubular necrosis. 3.  There is diverticulosis of the left side of the colon without active  diverticulitis. 4.  The patient had moderate to marked degenerative disc disease with vacuum  disc signs from his L3-L4 through the L5-S1 levels. Results from [place] [facility] encounter on 02/24/21    CT ABD PELV W CONT    Narrative  Exam: CT abdomen and pelvis with intravenous contrast    Comparison: 2/24/2021. 1/31/2015. Dose reduction: All CT scans at this facility are performed using dose reduction  optimization techniques as appropriate to perform the exam including the  following: automated exposure control, adjustments of the mA and/or kV according  to patient size, or use of iterative reconstruction technique. Technique: Following oral contrast ingestion and during  intravenous contrast  administration (100 cc of [id number]-[id number]), contiguous axial sections were obtained  through the abdomen and pelvis. Coronal and sagittal reconstructions were  generated. Findings: Scattered basilar atelectasis. Coronary artery calcific  atherosclerotic disease. Scattered pancolonic diverticulosis, without evident  diverticulitis. Bowel otherwise appears unremarkable. In particular, negative  for bowel dilatation, bowel wall thickening, pneumatosis intestinalis,  mesenteroportal venous gas, or free subdiaphragmatic air. The appendix is not  visualized, however, there is no evidence of inflammation in the vicinity of the  terminal ileum or cecum. Though calcific atherosclerotic disease affects the  vessel origins, mesenteric vascular inflow present. No free or loculated fluid. No mass or adenopathy. Mildly prominent gallbladder, without CT demonstrated  gallbladder wall thickening or pericholecystic fluid. No biliary or pancreatic  ductal dilatation.  Liver and pancreas unremarkable. Adrenals unremarkable. Spleen unremarkable. Kidneys and renal collecting systems unremarkable. No  abdominal wall or pelvic floor herniation. Lumbosacral degenerative disease. Visualized bone scaffolding otherwise unremarkable. Impression  1. No bowel obstruction. 2. Atherosclerotic disease of mesenteric inflow, without demonstrated untoward  downstream sequela at this time. 3. Coronary artery calcific atherosclerotic disease. MRI Results (maximum last 3): No results found for this or any previous visit. Nuclear Medicine Results (maximum last 3): No results found for this or any previous visit. US Results (maximum last 3): Results from [place] [facility] encounter on 04/19/21    US BREAST LT LIMITED=<3 QUAD    Narrative  Bilateral digital diagnostic mammogram: Male patient    HISTORY: Left subareolar breast pain    Comparison: Bilateral mammogram 2/9/2015    The breast parenchyma is fatty replaced. There is minimal right subareolar  breast stranding, less prominent than on prior study. There is focal linear  stranding in the left subareolar location without significant change from the  prior study and compatible with scarring. No mass or grouped calcifications in  either breast. There are left axillary lymph nodes, less prominent than on prior  study. Left breast ultrasound:    Left subareolar area left breast shows no cystic or solid mass and no shadowing  from tissue. Impression  Focal linear stranding in the left subareolar space compatible with  scarring and without change from prior mammogram. Negative ultrasound. BI-RADS code 2: Benign finding    BI-RADS follow-up code 2: Male patient: Clinical follow-up. :      Assessment and plan:   #Hyperglycemia  -Possible new onset diabetes  -Check HbA1c  -POC + correctional insulin every 4 hours. - IVF infusion     #Hyponatremia  -Likely secondary to hyperglycemia  -Recheck in a.m.     # Myalgia  - Likely secondary to above  - COVID screen    #Hypertension  -Hold home Lasix due to dehydration. Resume when appropriate    # PAPITO  - Likely secondary dehydration. May have underlying CKD  - IVF infusion  - Recheck in a.m. #Constipation  - Senna-S  - continue to monitor     # Full code by default, need further clarification    # Medication list reviewed on Epic and/or outside documentation. Not reviewed with patient.         Signed By: Sarah Dickinson MD     [place] 15, 2022

## 2022-06-15 NOTE — ED PROVIDER NOTES
EMERGENCY DEPARTMENT HISTORY AND PHYSICAL EXAM        Date: 6/15/2022  Patient Name: Kristi Rodriguez    History of Presenting Illness     Chief Complaint   Patient presents with    Cough    Sore Throat    Generalized Body Aches       History Provided By: Patient    HPI: Kristi Rodriguez, 77 y.o. male with history of arthritis, bowel striction, hypertension, and hyperlipidemia who presents with multiple complaints. States that he has had arm pain and hand pain for the last month. States he thinks it is arthritis. He has been seeing a doctor for this. This pain is moderate, nonradiating, constant. States he is also been having chest discomfort for the last couple of weeks that has been intermittent, nonradiating, located in the center of his chest.  States that yesterday he started to develop sore throat and body aches. He has had mild cough intermittently as well. States that he has been constipated. His last normal bowel movement was 1 week ago. Denies any vomiting. PCP: Dominik Mancia MD    Current Outpatient Medications   Medication Sig Dispense Refill    clopidogreL (PLAVIX) 75 mg tab       Metamucil 0.52 gram capsule TAKE 1 CAPSULE BY ORAL ROUTE.  nitroglycerin (NITRODUR) 0.4 mg/hr       colchicine 0.6 mg tablet Take 1 Tab by mouth two (2) times a day. 20 Tab 0    aspirin 81 mg chewable tablet Take 1 Tab by mouth daily. 30 Tab 2    famotidine (PEPCID) 20 mg tablet Take 1 Tab by mouth nightly. 30 Tab 1    furosemide (LASIX) 40 mg tablet Take  by mouth daily.  naproxen (NAPROSYN) 500 mg tablet Take 500 mg by mouth two (2) times daily (with meals).  atorvastatin (LIPITOR) 40 mg tablet Take  by mouth daily.  metoprolol tartrate (LOPRESSOR) 100 mg IR tablet Take  by mouth two (2) times a day.  isosorbide mononitrate ER (IMDUR) 60 mg CR tablet Take  by mouth every morning.          Past History     Past Medical History:  Past Medical History:   Diagnosis Date    Arthritis  Bowel obstruction (HCC)     High cholesterol     Hx of abdominal surgery     for SBO    Hypertension        Past Surgical History:  Past Surgical History:   Procedure Laterality Date    HX BREAST LUMPECTOMY Left     GA CHEST SURGERY PROCEDURE UNLISTED      open heart surgery       Family History:  Family History   Problem Relation Age of Onset    Hypertension Mother     Breast Cancer Mother     No Known Problems Father     Hypertension Other        Social History:  Social History     Tobacco Use    Smoking status: Former Smoker     Packs/day: 0.50     Years: 0.50     Pack years: 0.25     Quit date: 2020     Years since quittin.5    Smokeless tobacco: Never Used   Substance Use Topics    Alcohol use: Never    Drug use: Never       Allergies:  No Known Allergies    Review of Systems   Review of Systems   Constitutional: Negative for fever. HENT: Negative for congestion. Eyes: Negative for visual disturbance. Respiratory: Positive for cough. Negative for shortness of breath. Cardiovascular: Positive for chest pain. Gastrointestinal: Positive for abdominal pain and constipation. Negative for nausea and vomiting. Genitourinary: Negative for dysuria. Musculoskeletal: Positive for arthralgias. Skin: Negative for rash. Neurological: Positive for headaches. Physical Exam   Constitutional: No acute distress. Well-nourished. Skin: No rash. ENT: No rhinorrhea. No cough. Head is normocephalic and atraumatic. Eye: No proptosis or conjunctival injections. Respiratory: No apparent respiratory distress. Lung sounds are clear. Gastrointestinal: Distended but nontender. Musculoskeletal: No obvious bony deformities. Cardio: Regular rate and rhythm. No murmurs.     Diagnostic Study Results     Labs -     Recent Results (from the past 24 hour(s))   CBC WITH AUTOMATED DIFF    Collection Time: 06/15/22 11:00 AM   Result Value Ref Range    WBC 5.0 4.1 - 11.1 K/uL    RBC 3.93 (L) 4.10 - 5.70 M/uL    HGB 11.5 (L) 12.1 - 17.0 g/dL    HCT 34.5 (L) 36.6 - 50.3 %    MCV 87.8 80.0 - 99.0 FL    MCH 29.3 26.0 - 34.0 PG    MCHC 33.3 30.0 - 36.5 g/dL    RDW 13.8 11.5 - 14.5 %    PLATELET 873 683 - 484 K/uL    MPV 11.6 8.9 - 12.9 FL    NEUTROPHILS 69 32 - 75 %    LYMPHOCYTES 23 12 - 49 %    MONOCYTES 5 5 - 13 %    EOSINOPHILS 2 0 - 7 %    BASOPHILS 1 0 - 1 %    IMMATURE GRANULOCYTES 0 0.0 - 0.5 %    ABS. NEUTROPHILS 3.5 1.8 - 8.0 K/UL    ABS. LYMPHOCYTES 1.2 0.8 - 3.5 K/UL    ABS. MONOCYTES 0.2 0.0 - 1.0 K/UL    ABS. EOSINOPHILS 0.1 0.0 - 0.4 K/UL    ABS. BASOPHILS 0.0 0.0 - 0.1 K/UL    ABS. IMM. GRANS. 0.0 0.00 - 0.04 K/UL    DF AUTOMATED     METABOLIC PANEL, COMPREHENSIVE    Collection Time: 06/15/22 11:00 AM   Result Value Ref Range    Sodium 128 (L) 136 - 145 mmol/L    Potassium 4.1 3.5 - 5.1 mmol/L    Chloride 91 (L) 97 - 108 mmol/L    CO2 29 21 - 32 mmol/L    Anion gap 8 5 - 15 mmol/L    Glucose 669 (HH) 65 - 100 mg/dL    BUN 24 (H) 6 - 20 mg/dL    Creatinine 1.56 (H) 0.70 - 1.30 mg/dL    BUN/Creatinine ratio 15 12 - 20      GFR est AA 53 (L) >60 ml/min/1.73m2    GFR est non-AA 44 (L) >60 ml/min/1.73m2    Calcium 9.3 8.5 - 10.1 mg/dL    Bilirubin, total 1.1 (H) 0.2 - 1.0 mg/dL    AST (SGOT) 38 (H) 15 - 37 U/L    ALT (SGPT) 69 12 - 78 U/L    Alk.  phosphatase 127 (H) 45 - 117 U/L    Protein, total 6.5 6.4 - 8.2 g/dL    Albumin 3.6 3.5 - 5.0 g/dL    Globulin 2.9 2.0 - 4.0 g/dL    A-G Ratio 1.2 1.1 - 2.2     TROPONIN-HIGH SENSITIVITY    Collection Time: 06/15/22 11:00 AM   Result Value Ref Range    Troponin-High Sensitivity 33 0 - 76 ng/L   STREP AG SCREEN, GROUP A    Collection Time: 06/15/22 11:00 AM    Specimen: Throat   Result Value Ref Range    Group A Strep Ag ID Negative     GLUCOSE, POC    Collection Time: 06/15/22  2:01 PM   Result Value Ref Range    Glucose (POC) 514 (H) 65 - 117 mg/dL    Performed by [address] [place] [address], POC    Collection Time: 06/15/22  3:49 PM   Result Value Ref Range    Glucose (POC) 441 (H) 65 - 117 mg/dL    Performed by Napoleon Graves        Radiologic Studies -   CT ABD PELV WO CONT   Final Result   Stool through colon. No bowel obstruction. No CT evidence for appendicitis or diverticulitis. No   ascites. XR ABD (KUB)   Final Result      XR CHEST PORT   Final Result        CT Results  (Last 48 hours)               06/15/22 1327  CT ABD PELV WO CONT Final result    Impression:  Stool through colon. No bowel obstruction. No CT evidence for appendicitis or diverticulitis. No   ascites. Narrative:  CT abdomen and pelvis without IV contrast       Comparison CT abdomen and pelvis March 5, 2021. Axial images are reviewed along with reformatted sagittal/coronal images. No IV   contrast administered. Dose reduction: All CT scans at this facility are performed using dose reduction   optimization techniques as appropriate to a performed exam including the   following-   automated exposure control, adjustments of mA and/or Kv according to patient   size, or use of iterative reconstructive technique. Lung bases are clear. No pleural effusion. Liver, spleen, pancreas, and bilateral adrenal glands appear unremarkable on   this nonenhanced study. Mild cortical thinning bilateral kidneys. No hydronephrosis. Stomach and small bowel loops are decompressed. Stool through colon. Sigmoid   colon diverticula. No CT evidence for appendicitis or diverticulitis. No   ascites. Mild uniform prostate enlargement. Bladder nondistended. Atherosclerotic change normal caliber abdominal aorta with extension to pelvic   arteries. CXR Results  (Last 48 hours)               06/15/22 1049  XR CHEST PORT Final result    Narrative:  Chest single view. Comparison single view chest March 5, 2021. Unchanged appearance for the lungs; no interstitial or alveolar pulmonary edema.    Sternal wires in the midline related to prior intrathoracic surgery. Cardiac and   mediastinal structures unchanged. No pneumothorax or sizable pleural effusion. Medical Decision Making and ED Course     I reviewed the available vital signs, nursing notes, past medical history, past surgical history, family history, and social history. Vital Signs - Reviewed the patient's vital signs. Patient Vitals for the past 12 hrs:   Temp Pulse Resp BP SpO2   06/15/22 1646 -- 64 16 122/67 100 %   06/15/22 1104 98.3 °F (36.8 °C) 90 20 116/65 95 %     Medical Decision Making:   Presented with multiple complaints. The differential diagnosis is ACS, atypical chest pain, hyperglycemia, electrolyte abnormality, dehydration, constipation, bowel obstruction. Work-up shows hyperglycemia. Patient was given 2 L normal saline and multiple units of insulin. Glucose is starting to improve however that is relatively slow rate. I feel given patient's age that patient would benefit from observation. Discussed with hospitalist.  No signs of bowel obstruction on CT scan. He is likely constipated. Disposition     Admitted      Diagnosis     Clinical impression:   1. Hyperglycemia    2. Constipation, unspecified constipation type           Attestation:  Please note that this dictation was completed with Net[employer], the computer voice recognition software. Quite often unanticipated grammatical, syntax, homophones, and other interpretive errors are inadvertently transcribed by the computer software. Please disregard these errors. Please excuse any errors that have escaped final proofreading. Thank you.   Shannon Nevarez, DO

## 2022-06-16 LAB
ANION GAP SERPL CALC-SCNC: 5 MMOL/L (ref 5–15)
BUN SERPL-MCNC: 14 MG/DL (ref 6–20)
BUN/CREAT SERPL: 16 (ref 12–20)
CA-I BLD-MCNC: 9.1 MG/DL (ref 8.5–10.1)
CHLORIDE SERPL-SCNC: 108 MMOL/L (ref 97–108)
CO2 SERPL-SCNC: 28 MMOL/L (ref 21–32)
CREAT SERPL-MCNC: 0.86 MG/DL (ref 0.7–1.3)
EST. AVERAGE GLUCOSE BLD GHB EST-MCNC: ABNORMAL MG/DL
GLUCOSE BLD STRIP.AUTO-MCNC: 105 MG/DL (ref 65–117)
GLUCOSE BLD STRIP.AUTO-MCNC: 139 MG/DL (ref 65–117)
GLUCOSE BLD STRIP.AUTO-MCNC: 278 MG/DL (ref 65–117)
GLUCOSE BLD STRIP.AUTO-MCNC: 288 MG/DL (ref 65–117)
GLUCOSE BLD STRIP.AUTO-MCNC: 298 MG/DL (ref 65–117)
GLUCOSE BLD STRIP.AUTO-MCNC: 344 MG/DL (ref 65–117)
GLUCOSE SERPL-MCNC: 77 MG/DL (ref 65–100)
HBA1C MFR BLD: >14 % (ref 4–5.6)
PERFORMED BY, TECHID: ABNORMAL
PERFORMED BY, TECHID: NORMAL
POTASSIUM SERPL-SCNC: 3.8 MMOL/L (ref 3.5–5.1)
SODIUM SERPL-SCNC: 141 MMOL/L (ref 136–145)

## 2022-06-16 PROCEDURE — 74011250636 HC RX REV CODE- 250/636: Performed by: INTERNAL MEDICINE

## 2022-06-16 PROCEDURE — 74011000250 HC RX REV CODE- 250: Performed by: INTERNAL MEDICINE

## 2022-06-16 PROCEDURE — 96374 THER/PROPH/DIAG INJ IV PUSH: CPT

## 2022-06-16 PROCEDURE — 74011250637 HC RX REV CODE- 250/637: Performed by: INTERNAL MEDICINE

## 2022-06-16 PROCEDURE — 80048 BASIC METABOLIC PNL TOTAL CA: CPT

## 2022-06-16 PROCEDURE — 74011636637 HC RX REV CODE- 636/637: Performed by: INTERNAL MEDICINE

## 2022-06-16 PROCEDURE — 96372 THER/PROPH/DIAG INJ SC/IM: CPT

## 2022-06-16 PROCEDURE — G0378 HOSPITAL OBSERVATION PER HR: HCPCS

## 2022-06-16 PROCEDURE — 36415 COLL VENOUS BLD VENIPUNCTURE: CPT

## 2022-06-16 PROCEDURE — 93005 ELECTROCARDIOGRAM TRACING: CPT

## 2022-06-16 PROCEDURE — 83036 HEMOGLOBIN GLYCOSYLATED A1C: CPT

## 2022-06-16 PROCEDURE — 82962 GLUCOSE BLOOD TEST: CPT

## 2022-06-16 RX ORDER — AMOXICILLIN 250 MG
1 CAPSULE ORAL DAILY
Status: DISCONTINUED | OUTPATIENT
Start: 2022-06-16 | End: 2022-06-16

## 2022-06-16 RX ORDER — AMOXICILLIN 250 MG
2 CAPSULE ORAL DAILY
Status: DISCONTINUED | OUTPATIENT
Start: 2022-06-16 | End: 2022-06-17 | Stop reason: HOSPADM

## 2022-06-16 RX ORDER — INSULIN LISPRO 100 [IU]/ML
INJECTION, SOLUTION INTRAVENOUS; SUBCUTANEOUS
Status: DISCONTINUED | OUTPATIENT
Start: 2022-06-16 | End: 2022-06-16

## 2022-06-16 RX ORDER — INSULIN LISPRO 100 [IU]/ML
INJECTION, SOLUTION INTRAVENOUS; SUBCUTANEOUS
Status: DISCONTINUED | OUTPATIENT
Start: 2022-06-16 | End: 2022-06-17 | Stop reason: HOSPADM

## 2022-06-16 RX ORDER — GLIPIZIDE 5 MG/1
10 TABLET ORAL
Status: DISCONTINUED | OUTPATIENT
Start: 2022-06-16 | End: 2022-06-17 | Stop reason: HOSPADM

## 2022-06-16 RX ORDER — CALCIUM CARBONATE 200(500)MG
200 TABLET,CHEWABLE ORAL
Status: DISCONTINUED | OUTPATIENT
Start: 2022-06-16 | End: 2022-06-17 | Stop reason: HOSPADM

## 2022-06-16 RX ADMIN — GLIPIZIDE 10 MG: 5 TABLET ORAL at 11:45

## 2022-06-16 RX ADMIN — METOPROLOL TARTRATE 100 MG: 50 TABLET, FILM COATED ORAL at 08:37

## 2022-06-16 RX ADMIN — GABAPENTIN 300 MG: 300 CAPSULE ORAL at 08:36

## 2022-06-16 RX ADMIN — ENOXAPARIN SODIUM 40 MG: 100 INJECTION SUBCUTANEOUS at 08:36

## 2022-06-16 RX ADMIN — ACETAMINOPHEN 650 MG: 325 TABLET ORAL at 19:09

## 2022-06-16 RX ADMIN — SODIUM CHLORIDE, PRESERVATIVE FREE 10 ML: 5 INJECTION INTRAVENOUS at 15:04

## 2022-06-16 RX ADMIN — COLCHICINE 0.6 MG: 0.6 TABLET, FILM COATED ORAL at 21:50

## 2022-06-16 RX ADMIN — SODIUM CHLORIDE, PRESERVATIVE FREE 10 ML: 5 INJECTION INTRAVENOUS at 23:48

## 2022-06-16 RX ADMIN — ONDANSETRON 4 MG: 2 INJECTION INTRAMUSCULAR; INTRAVENOUS at 03:35

## 2022-06-16 RX ADMIN — INSULIN LISPRO 6 UNITS: 100 INJECTION, SOLUTION INTRAVENOUS; SUBCUTANEOUS at 12:50

## 2022-06-16 RX ADMIN — ATORVASTATIN CALCIUM 40 MG: 40 TABLET, FILM COATED ORAL at 08:36

## 2022-06-16 RX ADMIN — INSULIN LISPRO 12 UNITS: 100 INJECTION, SOLUTION INTRAVENOUS; SUBCUTANEOUS at 00:14

## 2022-06-16 RX ADMIN — COLCHICINE 0.6 MG: 0.6 TABLET, FILM COATED ORAL at 08:36

## 2022-06-16 RX ADMIN — SODIUM CHLORIDE, PRESERVATIVE FREE 10 ML: 5 INJECTION INTRAVENOUS at 05:40

## 2022-06-16 RX ADMIN — GABAPENTIN 300 MG: 300 CAPSULE ORAL at 16:41

## 2022-06-16 RX ADMIN — ASPIRIN 81 MG CHEWABLE TABLET 81 MG: 81 TABLET CHEWABLE at 08:36

## 2022-06-16 RX ADMIN — SENNOSIDES AND DOCUSATE SODIUM 2 TABLET: 50; 8.6 TABLET ORAL at 08:37

## 2022-06-16 RX ADMIN — INSULIN LISPRO 6 UNITS: 100 INJECTION, SOLUTION INTRAVENOUS; SUBCUTANEOUS at 16:41

## 2022-06-16 RX ADMIN — CLOPIDOGREL BISULFATE 75 MG: 75 TABLET ORAL at 08:37

## 2022-06-16 RX ADMIN — METOPROLOL TARTRATE 100 MG: 50 TABLET, FILM COATED ORAL at 21:50

## 2022-06-16 RX ADMIN — ISOSORBIDE MONONITRATE 60 MG: 60 TABLET, EXTENDED RELEASE ORAL at 08:36

## 2022-06-16 RX ADMIN — GABAPENTIN 300 MG: 300 CAPSULE ORAL at 21:50

## 2022-06-16 RX ADMIN — FAMOTIDINE 20 MG: 20 TABLET, FILM COATED ORAL at 08:37

## 2022-06-16 NOTE — PROGRESS NOTES
Problem: Patient Education:  Go to Education Activity  Goal: Patient/Family Education  Outcome: Progressing Towards Goal     Problem: Risk for Spread of Infection  Goal: Prevent transmission of infectious organism to others  Description: Prevent the transmission of infectious organisms to other patients, staff members, and visitors.   Outcome: Progressing Towards Goal

## 2022-06-16 NOTE — PROGRESS NOTES
0400 Hrs. patient complained of epigastrium discomfort (pressure)  EKG done   Normal sinus rhythm with unspecific ventricular delay

## 2022-06-16 NOTE — PROGRESS NOTES
Hospitalist Progress Note         Laroy Cushing, MD          Daily Progress Note: 6/16/2022      Subjective: The patient is seen for follow  up.  73-year-old gentleman who lives at home alone presented to the hospital with multitude of complaints. Noted with blood sugars of over 600. Notes improvement in body pain and chest pain.   No fevers overnight    Problem List:  Problem List as of 6/16/2022 Date Reviewed: 2/28/2022          Codes Class Noted - Resolved    Hyperglycemia ICD-10-CM: R73.9  ICD-9-CM: 790.29  6/15/2022 - Present        Diverticulitis ICD-10-CM: K57.92  ICD-9-CM: 562.11  3/9/2021 - Present        Rectal bleeding ICD-10-CM: K62.5  ICD-9-CM: 569.3  3/5/2021 - Present        Abdominal pain ICD-10-CM: R10.9  ICD-9-CM: 789.00  3/5/2021 - Present        Constipation ICD-10-CM: K59.00  ICD-9-CM: 564.00  3/5/2021 - Present        Small bowel obstruction ([facility] [address] ICD-10-CM: P91.817  ICD-9-CM: 560.9  2/25/2021 - Present        RESOLVED: Chronic constipation ICD-10-CM: K59.09  ICD-9-CM: 564.00  3/5/2021 - 3/9/2021              Medications reviewed  Current Facility-Administered Medications   Medication Dose Route Frequency    senna-docusate (PERICOLACE) 8.6-50 mg per tablet 2 Tablet  2 Tablet Oral DAILY    insulin lispro (HUMALOG) injection   SubCUTAneous TIDAC    calcium carbonate (TUMS) chewable tablet 200 mg [elemental]  200 mg Oral TID PRN    glipiZIDE (GLUCOTROL) tablet 10 mg  10 mg Oral ACB    aspirin chewable tablet 81 mg  81 mg Oral DAILY    colchicine tablet 0.6 mg  0.6 mg Oral BID    atorvastatin (LIPITOR) tablet 40 mg  40 mg Oral DAILY    clopidogreL (PLAVIX) tablet 75 mg  75 mg Oral DAILY    famotidine (PEPCID) tablet 20 mg  20 mg Oral DAILY    isosorbide mononitrate ER (IMDUR) tablet 60 mg  60 mg Oral DAILY    glucose chewable tablet 16 g  4 Tablet Oral PRN    glucagon (GLUCAGEN) injection 1 mg  1 mg IntraMUSCular PRN    dextrose 10% infusion 0-250 mL 0-250 mL IntraVENous PRN    sodium chloride (NS) flush 5-40 mL  5-40 mL IntraVENous Q8H    sodium chloride (NS) flush 5-40 mL  5-40 mL IntraVENous PRN    acetaminophen (TYLENOL) tablet 650 mg  650 mg Oral Q6H PRN    Or    acetaminophen (TYLENOL) suppository 650 mg  650 mg Rectal Q6H PRN    polyethylene glycol (MIRALAX) packet 17 g  17 g Oral DAILY PRN    ondansetron (ZOFRAN ODT) tablet 4 mg  4 mg Oral Q8H PRN    Or    ondansetron (ZOFRAN) injection 4 mg  4 mg IntraVENous Q6H PRN    enoxaparin (LOVENOX) injection 40 mg  40 mg SubCUTAneous DAILY    metoprolol tartrate (LOPRESSOR) tablet 100 mg  100 mg Oral BID    gabapentin (NEURONTIN) capsule 300 mg  300 mg Oral TID       Review of Systems:   A comprehensive review of systems was negative except for that written in the HPI. Objective:   Physical Exam:     Visit Vitals  /73 (BP 1 Location: Left upper arm, BP Patient Position: Semi fowlers)   Pulse 68   Temp 97.8 °F (36.6 °C)   Resp 18   Ht 5' 9\" (1.753 m)   Wt 106.1 kg (234 lb)   SpO2 97%   BMI 34.56 kg/m²      O2 Device: None (Room air)    Temp (24hrs), Av.9 °F (36.6 °C), Min:97.6 °F (36.4 °C), Max:98.2 °F (36.8 °C)    No intake/output data recorded. No intake/output data recorded. General:  Alert, cooperative, no distress, appears stated age. Lungs:   Clear to auscultation bilaterally. Chest wall:  No tenderness or deformity. Heart:  Regular rate and rhythm, S1, S2 normal, no murmur, click, rub or gallop. Abdomen:   Soft, non-tender. Bowel sounds normal. No masses,  No organomegaly. Extremities: Extremities normal, atraumatic, no cyanosis or edema. Pulses: 2+ and symmetric all extremities. Skin: Skin color, texture, turgor normal. No rashes or lesions   Neurologic: CNII-XII intact.  No gross sensory or motor deficits     Data Review:       Recent Days:  Recent Labs     06/15/22  1100   WBC 5.0   HGB 11.5*   HCT 34.5*        Recent Labs     22  4914 06/15/22  1100    128*   K 3.8 4.1    91*   CO2 28 29   GLU 77 669*   BUN 14 24*   CREA 0.86 1.56*   CA 9.1 9.3   ALB  --  3.6   TBILI  --  1.1*   ALT  --  69     No results for input(s): PH, PCO2, PO2, HCO3, FIO2 in the last 72 hours. 24 Hour Results:  Recent Results (from the past 24 hour(s))   GLUCOSE, POC    Collection Time: 06/15/22  2:01 PM   Result Value Ref Range    Glucose (POC) 514 (H) 65 - 117 mg/dL    Performed by Apoorva Wallis    GLUCOSE, POC    Collection Time: 06/15/22  3:49 PM   Result Value Ref Range    Glucose (POC) 441 (H) 65 - 117 mg/dL    Performed by Cande, POC    Collection Time: 06/15/22  8:47 PM   Result Value Ref Range    Glucose (POC) 484 (H) 65 - 117 mg/dL    Performed by [facility] [place], POC    Collection Time: 06/16/22 12:07 AM   Result Value Ref Range    Glucose (POC) 344 (H) 65 - 117 mg/dL    Performed by [facility], POC    Collection Time: 06/16/22  3:39 AM   Result Value Ref Range    Glucose (POC) 105 65 - 117 mg/dL    Performed by [address] BASIC    Collection Time: 06/16/22  6:27 AM   Result Value Ref Range    Sodium 141 136 - 145 mmol/L    Potassium 3.8 3.5 - 5.1 mmol/L    Chloride 108 97 - 108 mmol/L    CO2 28 21 - 32 mmol/L    Anion gap 5 5 - 15 mmol/L    Glucose 77 65 - 100 mg/dL    BUN 14 6 - 20 mg/dL    Creatinine 0.86 0.70 - 1.30 mg/dL    BUN/Creatinine ratio 16 12 - 20      GFR est AA >60 >60 ml/min/1.73m2    GFR est non-AA >60 >60 ml/min/1.73m2    Calcium 9.1 8.5 - 10.1 mg/dL   GLUCOSE, POC    Collection Time: 06/16/22  8:51 AM   Result Value Ref Range    Glucose (POC) 139 (H) 65 - 117 mg/dL    Performed by Parul Maxim            Assessment/     New onset diabetes  Benign essential hypertension  Hyperlipidemia      Plan:  Continue supportive care including sliding scale insulin  Begin glipizide 10 mg oral daily.  Cautioned patient to have adequate oral intake prior to taking glipizide  We will monitor overnight on glipizide  Anticipate discharge to home with home health tomorrow    Care Plan discussed with: Patient/Family    Total time spent with patient: 30 minutes.     Pallavi Fowler MD

## 2022-06-17 VITALS
WEIGHT: 234 LBS | DIASTOLIC BLOOD PRESSURE: 77 MMHG | OXYGEN SATURATION: 98 % | RESPIRATION RATE: 18 BRPM | SYSTOLIC BLOOD PRESSURE: 129 MMHG | BODY MASS INDEX: 34.66 KG/M2 | HEART RATE: 82 BPM | TEMPERATURE: 98.6 F | HEIGHT: 69 IN

## 2022-06-17 LAB
ATRIAL RATE: 66 BPM
CALCULATED P AXIS, ECG09: 33 DEGREES
CALCULATED R AXIS, ECG10: -19 DEGREES
CALCULATED T AXIS, ECG11: 17 DEGREES
DIAGNOSIS, 93000: NORMAL
GLUCOSE BLD STRIP.AUTO-MCNC: 203 MG/DL (ref 65–117)
GLUCOSE BLD STRIP.AUTO-MCNC: 214 MG/DL (ref 65–117)
GLUCOSE BLD STRIP.AUTO-MCNC: 312 MG/DL (ref 65–117)
P-R INTERVAL, ECG05: 198 MS
PERFORMED BY, TECHID: ABNORMAL
Q-T INTERVAL, ECG07: 380 MS
QRS DURATION, ECG06: 118 MS
QTC CALCULATION (BEZET), ECG08: 398 MS
VENTRICULAR RATE, ECG03: 66 BPM

## 2022-06-17 PROCEDURE — 74011636637 HC RX REV CODE- 636/637: Performed by: INTERNAL MEDICINE

## 2022-06-17 PROCEDURE — 74011000250 HC RX REV CODE- 250: Performed by: INTERNAL MEDICINE

## 2022-06-17 PROCEDURE — 82962 GLUCOSE BLOOD TEST: CPT

## 2022-06-17 PROCEDURE — 74011250636 HC RX REV CODE- 250/636: Performed by: INTERNAL MEDICINE

## 2022-06-17 PROCEDURE — 96372 THER/PROPH/DIAG INJ SC/IM: CPT

## 2022-06-17 PROCEDURE — 97161 PT EVAL LOW COMPLEX 20 MIN: CPT

## 2022-06-17 PROCEDURE — G0378 HOSPITAL OBSERVATION PER HR: HCPCS

## 2022-06-17 PROCEDURE — 74011250637 HC RX REV CODE- 250/637: Performed by: INTERNAL MEDICINE

## 2022-06-17 PROCEDURE — 74011250636 HC RX REV CODE- 250/636: Performed by: HOSPITALIST

## 2022-06-17 RX ORDER — INSULIN PUMP SYRINGE, 3 ML
EACH MISCELLANEOUS DAILY
Qty: 1 KIT | Refills: 0 | Status: SHIPPED | OUTPATIENT
Start: 2022-06-17 | End: 2022-06-17 | Stop reason: SDUPTHER

## 2022-06-17 RX ORDER — BLOOD SUGAR DIAGNOSTIC
STRIP MISCELLANEOUS SEE ADMIN INSTRUCTIONS
Qty: 30 STRIP | Refills: 0 | Status: SHIPPED | OUTPATIENT
Start: 2022-06-17

## 2022-06-17 RX ORDER — INSULIN GLARGINE 100 [IU]/ML
20 INJECTION, SOLUTION SUBCUTANEOUS
Qty: 6 ML | Refills: 0 | Status: SHIPPED | OUTPATIENT
Start: 2022-06-17 | End: 2022-07-17

## 2022-06-17 RX ORDER — INSULIN PUMP SYRINGE, 3 ML
EACH MISCELLANEOUS DAILY
Qty: 1 KIT | Refills: 0 | Status: SHIPPED | OUTPATIENT
Start: 2022-06-17

## 2022-06-17 RX ORDER — METFORMIN HYDROCHLORIDE 500 MG/1
500 TABLET ORAL 2 TIMES DAILY WITH MEALS
Qty: 60 TABLET | Refills: 0 | Status: SHIPPED | OUTPATIENT
Start: 2022-06-17

## 2022-06-17 RX ADMIN — ENOXAPARIN SODIUM 40 MG: 100 INJECTION SUBCUTANEOUS at 08:28

## 2022-06-17 RX ADMIN — GLIPIZIDE 10 MG: 5 TABLET ORAL at 08:02

## 2022-06-17 RX ADMIN — COLCHICINE 0.6 MG: 0.6 TABLET, FILM COATED ORAL at 08:01

## 2022-06-17 RX ADMIN — METOPROLOL TARTRATE 100 MG: 50 TABLET, FILM COATED ORAL at 08:01

## 2022-06-17 RX ADMIN — FAMOTIDINE 20 MG: 20 TABLET, FILM COATED ORAL at 08:02

## 2022-06-17 RX ADMIN — GABAPENTIN 300 MG: 300 CAPSULE ORAL at 08:02

## 2022-06-17 RX ADMIN — SODIUM CHLORIDE, PRESERVATIVE FREE 10 ML: 5 INJECTION INTRAVENOUS at 05:17

## 2022-06-17 RX ADMIN — SODIUM CHLORIDE 1000 ML: 9 INJECTION, SOLUTION INTRAVENOUS at 11:38

## 2022-06-17 RX ADMIN — INSULIN LISPRO 8 UNITS: 100 INJECTION, SOLUTION INTRAVENOUS; SUBCUTANEOUS at 11:38

## 2022-06-17 RX ADMIN — ISOSORBIDE MONONITRATE 60 MG: 60 TABLET, EXTENDED RELEASE ORAL at 08:02

## 2022-06-17 RX ADMIN — CLOPIDOGREL BISULFATE 75 MG: 75 TABLET ORAL at 08:01

## 2022-06-17 RX ADMIN — INSULIN LISPRO 4 UNITS: 100 INJECTION, SOLUTION INTRAVENOUS; SUBCUTANEOUS at 08:01

## 2022-06-17 RX ADMIN — ATORVASTATIN CALCIUM 40 MG: 40 TABLET, FILM COATED ORAL at 08:02

## 2022-06-17 RX ADMIN — ASPIRIN 81 MG CHEWABLE TABLET 81 MG: 81 TABLET CHEWABLE at 08:01

## 2022-06-17 NOTE — PROGRESS NOTES
Problem: Risk for Spread of Infection  Goal: Prevent transmission of infectious organism to others  Description: Prevent the transmission of infectious organisms to other patients, staff members, and visitors. Outcome: Progressing Towards Goal     Problem: Falls - Risk of  Goal: *Absence of Falls  Description: Document [place] Fall Risk and appropriate interventions in the flowsheet.   Outcome: Progressing Towards Goal  Note: Fall Risk Interventions:  Mobility Interventions: Bed/chair exit alarm,Patient to call before getting OOB         Medication Interventions: Patient to call before getting OOB,Bed/chair exit alarm,Teach patient to arise slowly

## 2022-06-17 NOTE — PROGRESS NOTES
OT eval order received and acknowledged. Pt screened and demonstrating baseline independence for self care tasks and functional mobility/transfers. Spoke w/ Nsg, who reports pt has been ambulating INDly within room without difficulty. Pt was observed to ambulate to bathroom and complete bathroom commode transfer w/out LOB. Pt reports he has [facility] Nsg come daily. Pt reports no need for skilled OT services at this time. OT evaluation order will therefore be discontinued this pt has no acute OT needs. Pt may benefit from [address] [place] [address] for home safety evaluation. Please reorder OT if pt's functional status changes. Thank you.

## 2022-06-17 NOTE — DISCHARGE SUMMARY
Physician Discharge Summary     Patient ID:    Irwin Castaneda  [id number]  89 y.o.  1945    Admit date: 6/15/2022    Discharge date : 6/17/2022    Chronic Diagnoses:    Problem List as of 6/17/2022 Date Reviewed: 2/28/2022          Codes Class Noted - Resolved    Hyperglycemia ICD-10-CM: R73.9  ICD-9-CM: 790.29  6/15/2022 - Present        Diverticulitis ICD-10-CM: K57.92  ICD-9-CM: 562.11  3/9/2021 - Present        Rectal bleeding ICD-10-CM: K62.5  ICD-9-CM: 569.3  3/5/2021 - Present        Abdominal pain ICD-10-CM: R10.9  ICD-9-CM: 789.00  3/5/2021 - Present        Constipation ICD-10-CM: K59.00  ICD-9-CM: 564.00  3/5/2021 - Present        Small bowel obstruction ([address] ICD-10-CM: E20.926  ICD-9-CM: 560.9  2/25/2021 - Present        RESOLVED: Chronic constipation ICD-10-CM: K59.09  ICD-9-CM: 564.00  3/5/2021 - 3/9/2021          22    Final Diagnoses:   Hyperglycemia [R73.9]    Reason for Hospitalization:        Hospital Course:     52-year-old gentleman who lives at home alone presented to the hospital with multitude of complaints. Noted with blood sugars of over 600. Notes improvement in body pain and chest pain. No fevers overnight    His A1c was 14, he was started with insulin and his symptoms improved,           INSTRUCTIONS ON DISCHARGE     (1) please note that your hemoglobin A1c is 14, you must take insulin       A: please take 20 units of lantus--pen at night after dinner and before bed     B: in the morning please check your sugars before breakfast and keep a log of it. C: follow-up with PCP in 1 week    (2) please note that the only medication added to your regimen is METFORMIN 500 mg twice a day        Discharge Medications:   Current Discharge Medication List      START taking these medications    Details   insulin glargine (Lantus Solostar U-100 Insulin) 100 unit/mL (3 mL) inpn 20 Units by SubCUTAneous route nightly for 30 days.   Qty: 6 mL, Refills: 0  Start date: 6/17/2022, End date: 7/17/2022      Blood-Glucose Meter monitoring kit by SubCUTAneous route daily. Check sugars daily  Qty: 1 Kit, Refills: 0  Start date: 6/17/2022      glucose blood VI test strips (Accu-Chek Lesvia Plus test strp) strip by Does Not Apply route See Admin Instructions. Qty: 30 Strip, Refills: 0  Start date: 6/17/2022      metFORMIN (GLUCOPHAGE) 500 mg tablet Take 1 Tablet by mouth two (2) times daily (with meals). Qty: 60 Tablet, Refills: 0  Start date: 6/17/2022         CONTINUE these medications which have NOT CHANGED    Details   gabapentin (NEURONTIN) 300 mg capsule Take 300 mg by mouth three (3) times daily. nitroglycerin (NITRODUR) 0.4 mg/hr       colchicine 0.6 mg tablet Take 1 Tab by mouth two (2) times a day. Qty: 20 Tab, Refills: 0      aspirin 81 mg chewable tablet Take 1 Tab by mouth daily. Qty: 30 Tab, Refills: 2      metoprolol tartrate (LOPRESSOR) 100 mg IR tablet Take  by mouth two (2) times a day. isosorbide mononitrate ER (IMDUR) 60 mg CR tablet Take  by mouth every morning. STOP taking these medications       clopidogreL (PLAVIX) 75 mg tab Comments:   Reason for Stopping:         Metamucil 0.52 gram capsule Comments:   Reason for Stopping:         famotidine (PEPCID) 20 mg tablet Comments:   Reason for Stopping:         furosemide (LASIX) 40 mg tablet Comments:   Reason for Stopping:         naproxen (NAPROSYN) 500 mg tablet Comments:   Reason for Stopping:         atorvastatin (LIPITOR) 40 mg tablet Comments:   Reason for Stopping: Follow up Care:    1. Ree Light MD in 1-2 weeks. Please call to set up an appointment shortly after discharge. Diet:  diabetic    Disposition:  home    Advanced Directive:   FULL x   DNR      Discharge Exam:  General:  Alert, cooperative, no distress, appears stated age. Lungs:   Clear to auscultation bilaterally. Chest wall:  No tenderness or deformity.    Heart:  Regular rate and rhythm, S1, S2 normal, no murmur, click, rub or gallop. Abdomen:   Soft, non-tender. Bowel sounds normal. No masses,  No organomegaly. Extremities: Extremities normal, atraumatic, no cyanosis or edema. Pulses: 2+ and symmetric all extremities. Skin: Skin color, texture, turgor normal. No rashes or lesions   Neurologic: CNII-XII intact. No gross sensory or motor deficits         CONSULTATIONS: none    Significant Diagnostic Studies:     Radiologic Studies -   Results from Hospital Encounter encounter on 06/15/22    XR ABD (KUB)    Narrative  Abdomen, 2 views. Excess stool throughout colon/rectum. On submitted two-view study, no free intraperitoneal air evident. Sternal wires  related to prior intrathoracic surgery. CT Results  (Last 48 hours)               06/15/22 1327  CT ABD PELV WO CONT Final result    Impression:  Stool through colon. No bowel obstruction. No CT evidence for appendicitis or diverticulitis. No   ascites. Narrative:  CT abdomen and pelvis without IV contrast       Comparison CT abdomen and pelvis March 5, 2021. Axial images are reviewed along with reformatted sagittal/coronal images. No IV   contrast administered. Dose reduction: All CT scans at this facility are performed using dose reduction   optimization techniques as appropriate to a performed exam including the   following-   automated exposure control, adjustments of mA and/or Kv according to patient   size, or use of iterative reconstructive technique. Lung bases are clear. No pleural effusion. Liver, spleen, pancreas, and bilateral adrenal glands appear unremarkable on   this nonenhanced study. Mild cortical thinning bilateral kidneys. No hydronephrosis. Stomach and small bowel loops are decompressed. Stool through colon. Sigmoid   colon diverticula. No CT evidence for appendicitis or diverticulitis. No   ascites. Mild uniform prostate enlargement. Bladder nondistended.         Atherosclerotic change normal caliber abdominal aorta with extension to pelvic   arteries.                      Discharge time spent 35 minutes    Signed:  Alycia Moore MD  6/17/2022  10:44 AM

## 2022-06-17 NOTE — PROGRESS NOTES
Discharge plan of care/case management plan validated with provider discharge order. Vital signs stable, PIV removed. Pt educated on insulin administration and importance of follow up with PCP. Patient's family to provider transportation home.

## 2022-06-17 NOTE — PROGRESS NOTES
CM in to see patient at the bedside. Patient states he lives alone but when he is discharged, his nephew will stay with him for a while. Also, PT recommended for patient to have rolling walker when discharged. Order sent to [facility].     Medicare pt has received, reviewed, and signed 2nd IM letter informing them of their right to appeal the discharge. Signed copied has been placed on pt bedside chart.

## 2022-06-17 NOTE — PROGRESS NOTES
PHYSICAL THERAPY EVALUATION  Patient: Zandra Turner (36 y.o. male)  Date: 6/17/2022  Primary Diagnosis: Hyperglycemia [R73.9]        Precautions: falls       ASSESSMENT  Pt is a 78 yo M with PMH of HTN, hyperlipidemia, and arthritis, presenting to ED with arm and head pain for past month, admitted 6/15/22 with hyperglycemia, hyponatremia, myalgia, HTN, PAPITO, and constipation. Prior level pt reported beng indep prior to this with mobility, and mod I with cane use. Reports having difficulty with B foot pain, which he has seen doctors for. Lives by himself in an apartment, however has nephew a few rooms down who is able to stay with him. Denied any falls in past 6 months    Based on the objective data described below, the patient presents with grossly decr BLE strength, decr upright balance requiring use of RW vs cane, and pain throughout B feet. Mobility performed this session: Pt received in bedside chair , sit <> stand was completed primarily with CGA. Initial standing balance primarily SBA-CGA. We initially performed bout of ambulation with cane. No specific LoB, however periodic unsteadiness with slight path veering which may be related to B feet pain. Once back in room, therapist provided RW. Pt much steadier on RW, and was able to ambulate ~ 100 ft without LOB / observed knee buckling. Gait improved from CGA with cane to stand by A with RW. As pt in not indep at this time, therapist discussed if nephew can stay with patient initially which he said he can. Pt also wanting RW for at home,  notified. No dizziness/lightheadedness reported this session. Pt did good with session today, able to ambulate at SBA with RW provided. Pt will benefit from continued skilled PT to address above deficits and return to PLOF.  Current PT DC recommendation HHPT and home w/ initial 24 hr A.     Current Level of Function Impacting Discharge (mobility/balance): stand by A - CGA     PLAN :  Recommendations and Planned Interventions: bed mobility training, transfer training, gait training, therapeutic exercises, neuromuscular re-education, orthotic/prosthetic training, edema management/control, patient and family training/education and therapeutic activities        Frequency/Duration: Patient will be followed by physical therapy:  2-3x/week to address goals.     Recommendation for discharge: (in order for the patient to meet his/her long term goals)  Home with [address] [place] [address]    This discharge recommendation:  Has been made in collaboration with the attending provider and/or case management    IF patient discharges home will need the following DME: rolling walker         SUBJECTIVE:   Patient agreeable to participation in therapy    OBJECTIVE DATA SUMMARY:   HISTORY:    Past Medical History:   Diagnosis Date    Arthritis     Bowel obstruction ([address]     High cholesterol     Hx of abdominal surgery     for SBO    Hypertension      Past Surgical History:   Procedure Laterality Date    HX BREAST LUMPECTOMY Left     CA CHEST SURGERY PROCEDURE UNLISTED      open heart surgery       Home Situation  Home Environment: Apartment  # Steps to Enter: 0 (Elevator)  One/Two Story Residence: One story  Living Alone: Yes  Support Systems: Other Family Member(s) (nephew can stay)  Patient Expects to be Discharged to[de-identified] Home  Current DME Used/Available at Home: [facility], straight,Walker, rollator    EXAMINATION/PRESENTATION/DECISION MAKING:   Critical Behavior:  Neurologic State: Alert  Orientation Level: Oriented to person,Oriented to place,Oriented to time     Safety/Judgement: Awareness of environment    Range Of Motion:  AROM: Generally decreased, functional (Decr B knee ROM grossly)                       Strength:    Strength: Generally decreased, functional (3+/5 ankle DF B, 4/5 knee ext B)                    Tone & Sensation:                  Sensation: Impaired (Grossly decr BLE distal LE)               Coordination: Vision:      Functional Mobility:  Bed Mobility:              Transfers:  Sit to Stand: Contact guard assistance  Stand to Sit: Contact guard assistance                       Balance:   Sitting: Intact; Without support  Standing: Intact; With support (No LOB, CGA initially, progressed to stand by A)  Ambulation/Gait Training:  Distance (ft): 100 Feet (ft)  Assistive Device: Cane, straight;Walker, rolling  Ambulation - Level of Assistance: Contact guard assistance;Stand-by assistance     Gait Description (WDL): Exceptions to WDL  Gait Abnormalities:  (step through, flex knee posture B midstance,slight path veer)      Functional Measure:  [facility] AM-PAC 6 Clicks         Basic Mobility Inpatient Short Form  How much difficulty does the patient currently have. .. Unable A Lot A Little None   1. Turning over in bed (including adjusting bedclothes, sheets and blankets)? [] 1   [] 2   [] 3   [x] 4   2. Sitting down on and standing up from a chair with arms ( e.g., wheelchair, bedside commode, etc.)   [] 1   [] 2   [] 3   [x] 4   3. Moving from lying on back to sitting on the side of the bed? [] 1   [] 2   [] 3   [x] 4          How much help from another person does the patient currently need. .. Total A Lot A Little None   4. Moving to and from a bed to a chair (including a wheelchair)? [] 1   [] 2   [] 3   [x] 4   5. Need to walk in hospital room? [] 1   [] 2   [x] 3   [] 4   6. Climbing 3-5 steps with a railing? [] 1   [] 2   [x] 3   [] 4   © 2007, Trustees of [facility], under license to [employer]. All rights reserved     Score:  Initial: 22/24 Most Recent: (Date: 6/17/22 )   Interpretation of Tool:  Represents activities that are increasingly more difficult (i.e. Bed mobility, Transfers, Gait).   Score 24 23 22-20 19-15 14-10 9-7 6   Modifier CH CI CJ CK CL CM CN         Physical Therapy Evaluation Charge Determination   History Examination Presentation Decision-Making   MEDIUM Complexity : 1-2 comorbidities / personal factors will impact the outcome/ POC  MEDIUM Complexity : 3 Standardized tests and measures addressing body structure, function, activity limitation and / or participation in recreation  LOW Complexity : Stable, uncomplicated  Other outcome measures [facility] 6  low      Based on the above components, the patient evaluation is determined to be of the following complexity level: LOW     Pain Ratin/10 throughout B feet. Pain throughout B feet is not new, however RN made aware    Activity Tolerance:   Good    After treatment patient left in no apparent distress:   Sitting in chair and Call bell within reach and RN updated. GOALS:    Problem: Mobility Impaired (Adult and Pediatric)  Goal: *Acute Goals and Plan of Care (Insert Text)  Description:   Pt will be I with LE HEP in 7 days. Pt will perform bed mobility with mod I in 7 days. Pt will perform transfers with mod I in 7 days. Pt will amb >100 feet with LRAD safely with mod I in 7 days. Outcome: Not Met       COMMUNICATION/EDUCATION:   The patients plan of care was discussed with: Registered nurse and Case management. Fall prevention education was provided and the patient/caregiver indicated understanding. and Patient/family have participated as able in goal setting and plan of care.        Thank you for this referral.  Kahlil Snow, PT, PT, DPT   Time Calculation: 20 mins

## 2022-06-17 NOTE — DISCHARGE INSTRUCTIONS
Patient Education        Constipation: Care Instructions  Overview     Constipation means that you have a hard time passing stools (bowel movements). People pass stools from 3 times a day to once every 3 days. What is normal for you may be different. Constipation may occur with pain in the rectum and cramping. The pain may get worse when you try to pass stools. Sometimes there are small amounts of bright red blood on toilet paper or the surface of stools. This is because of enlarged veins near the rectum (hemorrhoids). A few changes in your diet and lifestyle may help you avoid ongoing constipation. Your doctor may also prescribe medicine to help loosen your stool. Some medicines can cause constipation. These include pain medicines and antidepressants. Tell your doctor about all the medicines you take. Your doctor may want to make a medicine change to ease your symptoms. Follow-up care is a key part of your treatment and safety. Be sure to make and go to all appointments, and call your doctor if you are having problems. It's also a good idea to know your test results and keep a list of the medicines you take. How can you care for yourself at home? · Drink plenty of fluids. If you have kidney, heart, or liver disease and have to limit fluids, talk with your doctor before you increase the amount of fluids you drink. · Include high-fiber foods in your diet each day. These include fruits, vegetables, beans, and whole grains. · Get at least 30 minutes of exercise on most days of the week. Walking is a good choice. You also may want to do other activities, such as running, swimming, cycling, or playing tennis or team sports. · Take a fiber supplement, such as Citrucel or Metamucil, every day. Read and follow all instructions on the label. · Schedule time each day for a bowel movement. A daily routine may help. Take your time having a bowel movement, but don't sit for more than 10 minutes at a time.  And don't strain too much. · Support your feet with a small step stool when you sit on the toilet. This helps flex your hips and places your pelvis in a squatting position. · Your doctor may recommend an over-the-counter laxative to relieve your constipation. Examples are Milk of Magnesia and MiraLax. Read and follow all instructions on the label. Do not use laxatives on a long-term basis. When should you call for help? Call your doctor now or seek immediate medical care if:    · You have new or worse belly pain.     · You have new or worse nausea or vomiting.     · You have blood in your stools. Watch closely for changes in your health, and be sure to contact your doctor if:    · Your constipation is getting worse.     · You do not get better as expected. Where can you learn more? Go to http://www.[facility].com/  Enter P343 in the search box to learn more about \"Constipation: Care Instructions. \"  Current as of: July 1, 2021               Content Version: 13.2  © 2006-2022 [employer]. Care instructions adapted under license by [employer] (which disclaims liability or warranty for this information). If you have questions about a medical condition or this instruction, always ask your healthcare professional. [place] [address] any warranty or liability for your use of this information. INSTRUCTIONS ON DISCHARGE     (1) please note that your hemoglobin A1c is 14, you must take insulin       A: please take 20 units of lantus--pen at night after dinner and before bed     B: in the morning please check your sugars before breakfast and keep a log of it.      C: follow-up with PCP in 1 week    (2) please note that the only medication added to your regimen is METFORMIN 500 mg twice a day

## 2022-08-25 ENCOUNTER — APPOINTMENT (OUTPATIENT)
Dept: ENDOSCOPY | Age: 77
End: 2022-08-25
Attending: INTERNAL MEDICINE
Payer: MEDICARE

## 2022-08-25 ENCOUNTER — HOSPITAL ENCOUNTER (OUTPATIENT)
Age: 77
Setting detail: OUTPATIENT SURGERY
Discharge: HOME | End: 2022-08-25
Attending: INTERNAL MEDICINE | Admitting: INTERNAL MEDICINE
Payer: MEDICARE

## 2022-08-25 ENCOUNTER — ANESTHESIA EVENT (OUTPATIENT)
Dept: ENDOSCOPY | Age: 77
End: 2022-08-25
Payer: MEDICARE

## 2022-08-25 ENCOUNTER — ANESTHESIA (OUTPATIENT)
Dept: ENDOSCOPY | Age: 77
End: 2022-08-25
Payer: MEDICARE

## 2022-08-25 VITALS
DIASTOLIC BLOOD PRESSURE: 84 MMHG | HEIGHT: 66 IN | HEART RATE: 70 BPM | BODY MASS INDEX: 39.21 KG/M2 | RESPIRATION RATE: 16 BRPM | OXYGEN SATURATION: 100 % | SYSTOLIC BLOOD PRESSURE: 154 MMHG | TEMPERATURE: 97 F | WEIGHT: 244 LBS

## 2022-08-25 LAB
GLUCOSE BLD STRIP.AUTO-MCNC: 83 MG/DL (ref 65–117)
PERFORMED BY, TECHID: NORMAL

## 2022-08-25 PROCEDURE — 74011000250 HC RX REV CODE- 250: Performed by: NURSE ANESTHETIST, CERTIFIED REGISTERED

## 2022-08-25 PROCEDURE — 82962 GLUCOSE BLOOD TEST: CPT

## 2022-08-25 PROCEDURE — 2709999900 HC NON-CHARGEABLE SUPPLY: Performed by: INTERNAL MEDICINE

## 2022-08-25 PROCEDURE — 74011250636 HC RX REV CODE- 250/636: Performed by: NURSE ANESTHETIST, CERTIFIED REGISTERED

## 2022-08-25 PROCEDURE — 76040000007: Performed by: INTERNAL MEDICINE

## 2022-08-25 PROCEDURE — 76060000032 HC ANESTHESIA 0.5 TO 1 HR: Performed by: INTERNAL MEDICINE

## 2022-08-25 RX ORDER — LIDOCAINE HYDROCHLORIDE 20 MG/ML
INJECTION, SOLUTION EPIDURAL; INFILTRATION; INTRACAUDAL; PERINEURAL AS NEEDED
Status: DISCONTINUED | OUTPATIENT
Start: 2022-08-25 | End: 2022-08-25 | Stop reason: HOSPADM

## 2022-08-25 RX ORDER — SODIUM CHLORIDE, SODIUM LACTATE, POTASSIUM CHLORIDE, CALCIUM CHLORIDE 600; 310; 30; 20 MG/100ML; MG/100ML; MG/100ML; MG/100ML
50 INJECTION, SOLUTION INTRAVENOUS CONTINUOUS
Status: DISCONTINUED | OUTPATIENT
Start: 2022-08-25 | End: 2022-08-25 | Stop reason: HOSPADM

## 2022-08-25 RX ORDER — PROPOFOL 10 MG/ML
INJECTION, EMULSION INTRAVENOUS AS NEEDED
Status: DISCONTINUED | OUTPATIENT
Start: 2022-08-25 | End: 2022-08-25 | Stop reason: HOSPADM

## 2022-08-25 RX ORDER — SODIUM CHLORIDE, SODIUM LACTATE, POTASSIUM CHLORIDE, CALCIUM CHLORIDE 600; 310; 30; 20 MG/100ML; MG/100ML; MG/100ML; MG/100ML
INJECTION, SOLUTION INTRAVENOUS
Status: DISCONTINUED | OUTPATIENT
Start: 2022-08-25 | End: 2022-08-25 | Stop reason: HOSPADM

## 2022-08-25 RX ADMIN — PROPOFOL 50 MG: 10 INJECTION, EMULSION INTRAVENOUS at 12:28

## 2022-08-25 RX ADMIN — PROPOFOL 50 MG: 10 INJECTION, EMULSION INTRAVENOUS at 12:24

## 2022-08-25 RX ADMIN — PROPOFOL 30 MG: 10 INJECTION, EMULSION INTRAVENOUS at 12:18

## 2022-08-25 RX ADMIN — PROPOFOL 120 MG: 10 INJECTION, EMULSION INTRAVENOUS at 12:16

## 2022-08-25 RX ADMIN — LIDOCAINE HYDROCHLORIDE 100 MG: 20 INJECTION, SOLUTION EPIDURAL; INFILTRATION; INTRACAUDAL; PERINEURAL at 12:16

## 2022-08-25 RX ADMIN — SODIUM CHLORIDE, POTASSIUM CHLORIDE, SODIUM LACTATE AND CALCIUM CHLORIDE: 600; 310; 30; 20 INJECTION, SOLUTION INTRAVENOUS at 12:13

## 2022-08-25 NOTE — DISCHARGE INSTRUCTIONS
Continue on current medications - resume plavix and aspirin tomorrow. Avoid NSAID's (such as Motrin, Ibuprofen, Aleve, Naproxen, etc)    Any issues with procedure, such as pain, bleeding, fever, call office during daytime hours or call hospital at [phone] to page me, or go to ER at other time.

## 2022-08-25 NOTE — ANESTHESIA PREPROCEDURE EVALUATION
Relevant Problems   No relevant active problems       Anesthetic History   No history of anesthetic complications            Review of Systems / Medical History  Patient summary reviewed, nursing notes reviewed and pertinent labs reviewed    Pulmonary                   Neuro/Psych              Cardiovascular    Hypertension          CAD, CABG and hyperlipidemia      Comments: EKG (6-17-22) Normal sinus rhythm   Non-specific intra-ventricular conduction delay   Borderline ECG   No previous ECGs available    GI/Hepatic/Renal               Comments: STOMACH ACHE. DYSPHAGIA. PAIN ABDOMEN. DIVERTICULITIS. Endo/Other    Diabetes    Obesity and arthritis     Other Findings   Comments: NITROPATCH IS ON. NECK PAIN.           Physical Exam    Airway  Mallampati: IV  TM Distance: < 4 cm  Neck ROM: decreased range of motion, short neck   Mouth opening: Normal     Cardiovascular    Rhythm: regular  Rate: normal         Dental    Dentition: Edentulous     Pulmonary  Breath sounds clear to auscultation               Abdominal  GI exam deferred       Other Findings            Anesthetic Plan    ASA: 3  Anesthesia type: MAC          Induction: Intravenous  Anesthetic plan and risks discussed with: Patient

## 2022-08-25 NOTE — ANESTHESIA POSTPROCEDURE EVALUATION
Procedure(s):  COLONOSCOPY  ESOPHAGOGASTRODUODENOSCOPY (EGD). MAC    Anesthesia Post Evaluation      Multimodal analgesia: multimodal analgesia used between 6 hours prior to anesthesia start to PACU discharge  Patient location during evaluation: bedside  Patient participation: complete - patient participated  Level of consciousness: lethargic  Pain score: 0  Airway patency: patent  Anesthetic complications: no  Cardiovascular status: acceptable  Respiratory status: acceptable  Hydration status: acceptable  Comments: VSS. Report to RN.  patient remains on stretcher  Post anesthesia nausea and vomiting:  none  Final Post Anesthesia Temperature Assessment:  Normothermia (36.0-37.5 degrees C)      INITIAL Post-op Vital signs:   Vitals Value Taken Time   /57 08/25/22 1234   Temp 36.3 °C (97.3 °F) 08/25/22 1234   Pulse 71 08/25/22 1234   Resp 13 08/25/22 1234   SpO2 98 % 08/25/22 1234

## 2022-08-25 NOTE — PERIOP NOTES
Patient alert and oriented x4, VS stable, no complaints of pain at this time. No one at bedside. Niece called, she will call us when she gets to main entrance. Discharge instructions/education provided to radha, Kenny Oliveros, she verbalized understanding and had no questions. Patient was discharged to home at main entrance of hospital with radha via private vehicle.

## 2022-08-25 NOTE — PROGRESS NOTES
Dr Rusty Nelson made aware that patient has nitro patch to left upper arm. Dr Rusty Nelson stated it was okay for patient to keep the patch on. Patient is not complaining of any chest pain, patient states he is supposed to apply the nitro patch every day.

## 2024-05-29 NOTE — ED NOTES
Patient care and report received from Martin Pacheco, [address] [place] [address]. The patient is lying supine on the bed talking to staff. BS are present X 4 quadrants. How Severe Is Your Skin Lesion?: moderate Has Your Skin Lesion Been Treated?: not been treated Is This A New Presentation, Or A Follow-Up?: Skin Lesions

## 2025-07-09 ENCOUNTER — TELEPHONE (OUTPATIENT)
Facility: CLINIC | Age: 80
End: 2025-07-09

## 2025-07-09 NOTE — TELEPHONE ENCOUNTER
----- Message from Storm VALENCIA sent at 7/9/2025 12:08 PM EDT -----  Regarding: [facility] Appointment Request  [facility] Appointment Request    Patient needs appointment for [facility] Appointment Type: New Patient.    Patient Requested Dates(s): any day of July   Patient Requested Time: morning   Provider Name: Mook Peterson MD    Reason for Appointment Request: New Patient - No appointments available during search  --------------------------------------------------------------------------------------------------------------------------    Relationship to Patient: Other :  rosalia garcia     Call Back Information: OK to leave message on [facility]  Preferred Call Back Number: Phone [phone]

## 2025-07-10 NOTE — TELEPHONE ENCOUNTER
Called the patient to get him scheduled for an appt--he stated he will have rosalia garcia give us a call back--no appt scheduled at this time.

## (undated) DEVICE — MASK ANES INF SZ 2 PREM TAIL VLV INFL PRT UNSCENTED SGL PT

## (undated) DEVICE — THE ENDO CARRY-ON PROCEDURE KIT CONTAINS ALL OF THE SUPPLIES AND INFECTION PREVENTION PRODUCTS NEEDED FOR ENDOSCOPIC PROCEDURES: Brand: ENDO CARRY-ON PROCEDURE KIT

## (undated) DEVICE — MOUTHPIECE ENDOSCP 20X27MM --

## (undated) DEVICE — FCPS RAD JAW 4LC 240CM W/NDL -- BX/20 RADIAL JAW 4